# Patient Record
Sex: FEMALE | Race: WHITE | NOT HISPANIC OR LATINO | Employment: FULL TIME | URBAN - METROPOLITAN AREA
[De-identification: names, ages, dates, MRNs, and addresses within clinical notes are randomized per-mention and may not be internally consistent; named-entity substitution may affect disease eponyms.]

---

## 2019-04-16 ENCOUNTER — HOSPITAL ENCOUNTER (INPATIENT)
Facility: HOSPITAL | Age: 64
LOS: 12 days | Discharge: HOME | DRG: 025 | End: 2019-04-28
Attending: EMERGENCY MEDICINE | Admitting: PSYCHIATRY & NEUROLOGY
Payer: COMMERCIAL

## 2019-04-16 DIAGNOSIS — Z29.89 SEIZURE PROPHYLAXIS: ICD-10-CM

## 2019-04-16 DIAGNOSIS — I10 ESSENTIAL HYPERTENSION: ICD-10-CM

## 2019-04-16 DIAGNOSIS — I60.9 SAH (SUBARACHNOID HEMORRHAGE): ICD-10-CM

## 2019-04-16 DIAGNOSIS — Z98.890 S/P COIL EMBOLIZATION OF CEREBRAL ANEURYSM: ICD-10-CM

## 2019-04-16 PROBLEM — R51.9 HEADACHE: Status: ACTIVE | Noted: 2019-04-16

## 2019-04-16 PROBLEM — E07.9 THYROID DISEASE: Status: ACTIVE | Noted: 2019-04-16

## 2019-04-16 LAB
ABO + RH BLD: NORMAL
ALBUMIN SERPL BCP-MCNC: 4.1 G/DL (ref 3.5–5.2)
ALP SERPL-CCNC: 80 U/L (ref 55–135)
ALT SERPL W/O P-5'-P-CCNC: 25 U/L (ref 10–44)
ANION GAP SERPL CALC-SCNC: 10 MMOL/L (ref 8–16)
APTT BLDCRRT: 25 SEC (ref 21–32)
AST SERPL-CCNC: 23 U/L (ref 10–40)
BASOPHILS # BLD AUTO: 0.04 K/UL (ref 0–0.2)
BASOPHILS NFR BLD: 0.6 % (ref 0–1.9)
BILIRUB SERPL-MCNC: 0.7 MG/DL (ref 0.1–1)
BLD GP AB SCN CELLS X3 SERPL QL: NORMAL
BUN SERPL-MCNC: 13 MG/DL (ref 8–23)
CALCIUM SERPL-MCNC: 9.4 MG/DL (ref 8.7–10.5)
CHLORIDE SERPL-SCNC: 109 MMOL/L (ref 95–110)
CHOLEST SERPL-MCNC: 178 MG/DL (ref 120–199)
CHOLEST/HDLC SERPL: 2.9 {RATIO} (ref 2–5)
CO2 SERPL-SCNC: 24 MMOL/L (ref 23–29)
CREAT SERPL-MCNC: 0.8 MG/DL (ref 0.5–1.4)
DIFFERENTIAL METHOD: NORMAL
EOSINOPHIL # BLD AUTO: 0 K/UL (ref 0–0.5)
EOSINOPHIL NFR BLD: 0.5 % (ref 0–8)
ERYTHROCYTE [DISTWIDTH] IN BLOOD BY AUTOMATED COUNT: 13.2 % (ref 11.5–14.5)
EST. GFR  (AFRICAN AMERICAN): >60 ML/MIN/1.73 M^2
EST. GFR  (NON AFRICAN AMERICAN): >60 ML/MIN/1.73 M^2
ESTIMATED AVG GLUCOSE: 111 MG/DL (ref 68–131)
GLUCOSE SERPL-MCNC: 108 MG/DL (ref 70–110)
HBA1C MFR BLD HPLC: 5.5 % (ref 4–5.6)
HCT VFR BLD AUTO: 40.6 % (ref 37–48.5)
HDLC SERPL-MCNC: 62 MG/DL (ref 40–75)
HDLC SERPL: 34.8 % (ref 20–50)
HGB BLD-MCNC: 13.8 G/DL (ref 12–16)
IMM GRANULOCYTES # BLD AUTO: 0.02 K/UL (ref 0–0.04)
IMM GRANULOCYTES NFR BLD AUTO: 0.3 % (ref 0–0.5)
INR PPP: 1 (ref 0.8–1.2)
LDLC SERPL CALC-MCNC: 101.8 MG/DL (ref 63–159)
LYMPHOCYTES # BLD AUTO: 1.4 K/UL (ref 1–4.8)
LYMPHOCYTES NFR BLD: 21.7 % (ref 18–48)
MAGNESIUM SERPL-MCNC: 2 MG/DL (ref 1.6–2.6)
MCH RBC QN AUTO: 29.9 PG (ref 27–31)
MCHC RBC AUTO-ENTMCNC: 34 G/DL (ref 32–36)
MCV RBC AUTO: 88 FL (ref 82–98)
MONOCYTES # BLD AUTO: 0.5 K/UL (ref 0.3–1)
MONOCYTES NFR BLD: 7.5 % (ref 4–15)
NEUTROPHILS # BLD AUTO: 4.5 K/UL (ref 1.8–7.7)
NEUTROPHILS NFR BLD: 69.4 % (ref 38–73)
NONHDLC SERPL-MCNC: 116 MG/DL
NRBC BLD-RTO: 0 /100 WBC
PHOSPHATE SERPL-MCNC: 2.7 MG/DL (ref 2.7–4.5)
PLATELET # BLD AUTO: 281 K/UL (ref 150–350)
PMV BLD AUTO: 9.9 FL (ref 9.2–12.9)
POCT GLUCOSE: 100 MG/DL (ref 70–110)
POTASSIUM SERPL-SCNC: 3.8 MMOL/L (ref 3.5–5.1)
PROT SERPL-MCNC: 7.3 G/DL (ref 6–8.4)
PROTHROMBIN TIME: 10.2 SEC (ref 9–12.5)
RBC # BLD AUTO: 4.62 M/UL (ref 4–5.4)
SODIUM SERPL-SCNC: 143 MMOL/L (ref 136–145)
T4 FREE SERPL-MCNC: 1.32 NG/DL (ref 0.71–1.51)
TRIGL SERPL-MCNC: 71 MG/DL (ref 30–150)
TROPONIN I SERPL DL<=0.01 NG/ML-MCNC: 0.01 NG/ML (ref 0–0.03)
TSH SERPL DL<=0.005 MIU/L-ACNC: 0.86 UIU/ML (ref 0.4–4)
WBC # BLD AUTO: 6.44 K/UL (ref 3.9–12.7)

## 2019-04-16 PROCEDURE — 20000000 HC ICU ROOM

## 2019-04-16 PROCEDURE — 25000003 PHARM REV CODE 250: Performed by: NURSE PRACTITIONER

## 2019-04-16 PROCEDURE — 36620 INSERTION CATHETER ARTERY: CPT

## 2019-04-16 PROCEDURE — 84443 ASSAY THYROID STIM HORMONE: CPT

## 2019-04-16 PROCEDURE — 82962 GLUCOSE BLOOD TEST: CPT

## 2019-04-16 PROCEDURE — 94761 N-INVAS EAR/PLS OXIMETRY MLT: CPT

## 2019-04-16 PROCEDURE — 84100 ASSAY OF PHOSPHORUS: CPT

## 2019-04-16 PROCEDURE — 99284 EMERGENCY DEPT VISIT MOD MDM: CPT | Mod: ,,, | Performed by: PHYSICIAN ASSISTANT

## 2019-04-16 PROCEDURE — A9585 GADOBUTROL INJECTION: HCPCS | Performed by: PSYCHIATRY & NEUROLOGY

## 2019-04-16 PROCEDURE — 83735 ASSAY OF MAGNESIUM: CPT

## 2019-04-16 PROCEDURE — 86850 RBC ANTIBODY SCREEN: CPT

## 2019-04-16 PROCEDURE — 99283 PR EMERGENCY DEPT VISIT,LEVEL III: ICD-10-PCS | Mod: ,,, | Performed by: PHYSICIAN ASSISTANT

## 2019-04-16 PROCEDURE — 99285 EMERGENCY DEPT VISIT HI MDM: CPT | Mod: 25

## 2019-04-16 PROCEDURE — 93010 ELECTROCARDIOGRAM REPORT: CPT | Mod: ,,, | Performed by: INTERNAL MEDICINE

## 2019-04-16 PROCEDURE — 84484 ASSAY OF TROPONIN QUANT: CPT

## 2019-04-16 PROCEDURE — 63600175 PHARM REV CODE 636 W HCPCS: Performed by: NURSE PRACTITIONER

## 2019-04-16 PROCEDURE — 99291 PR CRITICAL CARE, E/M 30-74 MINUTES: ICD-10-PCS | Mod: ,,, | Performed by: PSYCHIATRY & NEUROLOGY

## 2019-04-16 PROCEDURE — 84439 ASSAY OF FREE THYROXINE: CPT

## 2019-04-16 PROCEDURE — 99284 PR EMERGENCY DEPT VISIT,LEVEL IV: ICD-10-PCS | Mod: ,,, | Performed by: PHYSICIAN ASSISTANT

## 2019-04-16 PROCEDURE — 80053 COMPREHEN METABOLIC PANEL: CPT

## 2019-04-16 PROCEDURE — 96365 THER/PROPH/DIAG IV INF INIT: CPT

## 2019-04-16 PROCEDURE — 83036 HEMOGLOBIN GLYCOSYLATED A1C: CPT

## 2019-04-16 PROCEDURE — 25000003 PHARM REV CODE 250

## 2019-04-16 PROCEDURE — 80061 LIPID PANEL: CPT

## 2019-04-16 PROCEDURE — 93010 EKG 12-LEAD: ICD-10-PCS | Mod: ,,, | Performed by: INTERNAL MEDICINE

## 2019-04-16 PROCEDURE — 99291 CRITICAL CARE FIRST HOUR: CPT | Mod: ,,, | Performed by: PSYCHIATRY & NEUROLOGY

## 2019-04-16 PROCEDURE — 99223 PR INITIAL HOSPITAL CARE,LEVL III: ICD-10-PCS | Mod: ,,, | Performed by: PSYCHIATRY & NEUROLOGY

## 2019-04-16 PROCEDURE — 93005 ELECTROCARDIOGRAM TRACING: CPT

## 2019-04-16 PROCEDURE — 96366 THER/PROPH/DIAG IV INF ADDON: CPT

## 2019-04-16 PROCEDURE — 25500020 PHARM REV CODE 255: Performed by: PSYCHIATRY & NEUROLOGY

## 2019-04-16 PROCEDURE — 85610 PROTHROMBIN TIME: CPT

## 2019-04-16 PROCEDURE — 99223 1ST HOSP IP/OBS HIGH 75: CPT | Mod: ,,, | Performed by: PSYCHIATRY & NEUROLOGY

## 2019-04-16 PROCEDURE — 85730 THROMBOPLASTIN TIME PARTIAL: CPT

## 2019-04-16 PROCEDURE — 99283 EMERGENCY DEPT VISIT LOW MDM: CPT | Mod: ,,, | Performed by: PHYSICIAN ASSISTANT

## 2019-04-16 PROCEDURE — 85025 COMPLETE CBC W/AUTO DIFF WBC: CPT

## 2019-04-16 RX ORDER — LEVETIRACETAM 500 MG/1
500 TABLET ORAL 2 TIMES DAILY
Status: DISCONTINUED | OUTPATIENT
Start: 2019-04-16 | End: 2019-04-16

## 2019-04-16 RX ORDER — LIDOCAINE HYDROCHLORIDE 10 MG/ML
5 INJECTION, SOLUTION EPIDURAL; INFILTRATION; INTRACAUDAL; PERINEURAL ONCE
Status: COMPLETED | OUTPATIENT
Start: 2019-04-16 | End: 2019-04-16

## 2019-04-16 RX ORDER — ONDANSETRON 2 MG/ML
4 INJECTION INTRAMUSCULAR; INTRAVENOUS EVERY 8 HOURS PRN
Status: DISCONTINUED | OUTPATIENT
Start: 2019-04-16 | End: 2019-04-16

## 2019-04-16 RX ORDER — LEVETIRACETAM 5 MG/ML
500 INJECTION INTRAVASCULAR EVERY 12 HOURS
Status: DISCONTINUED | OUTPATIENT
Start: 2019-04-16 | End: 2019-04-18

## 2019-04-16 RX ORDER — LANOLIN ALCOHOL/MO/W.PET/CERES
800 CREAM (GRAM) TOPICAL
Status: DISCONTINUED | OUTPATIENT
Start: 2019-04-16 | End: 2019-04-26

## 2019-04-16 RX ORDER — ONDANSETRON 2 MG/ML
4 INJECTION INTRAMUSCULAR; INTRAVENOUS EVERY 6 HOURS PRN
Status: DISCONTINUED | OUTPATIENT
Start: 2019-04-16 | End: 2019-04-28 | Stop reason: HOSPADM

## 2019-04-16 RX ORDER — SODIUM,POTASSIUM PHOSPHATES 280-250MG
2 POWDER IN PACKET (EA) ORAL
Status: DISCONTINUED | OUTPATIENT
Start: 2019-04-16 | End: 2019-04-26

## 2019-04-16 RX ORDER — POTASSIUM CHLORIDE 20 MEQ/15ML
60 SOLUTION ORAL
Status: DISCONTINUED | OUTPATIENT
Start: 2019-04-16 | End: 2019-04-18

## 2019-04-16 RX ORDER — HYDRALAZINE HYDROCHLORIDE 20 MG/ML
10 INJECTION INTRAMUSCULAR; INTRAVENOUS EVERY 4 HOURS PRN
Status: DISCONTINUED | OUTPATIENT
Start: 2019-04-16 | End: 2019-04-17

## 2019-04-16 RX ORDER — ATORVASTATIN CALCIUM 10 MG/1
40 TABLET, FILM COATED ORAL DAILY
Status: DISCONTINUED | OUTPATIENT
Start: 2019-04-17 | End: 2019-04-16

## 2019-04-16 RX ORDER — NICARDIPINE HYDROCHLORIDE 0.2 MG/ML
2.5 INJECTION INTRAVENOUS CONTINUOUS
Status: DISCONTINUED | OUTPATIENT
Start: 2019-04-16 | End: 2019-04-16

## 2019-04-16 RX ORDER — POTASSIUM CHLORIDE 20 MEQ/15ML
40 SOLUTION ORAL
Status: DISCONTINUED | OUTPATIENT
Start: 2019-04-16 | End: 2019-04-18

## 2019-04-16 RX ORDER — NIMODIPINE 30 MG/1
60 CAPSULE, LIQUID FILLED ORAL EVERY 4 HOURS
Status: DISCONTINUED | OUTPATIENT
Start: 2019-04-16 | End: 2019-04-28 | Stop reason: HOSPADM

## 2019-04-16 RX ORDER — OXYCODONE HYDROCHLORIDE 5 MG/1
5 TABLET ORAL EVERY 6 HOURS PRN
Status: DISCONTINUED | OUTPATIENT
Start: 2019-04-16 | End: 2019-04-28 | Stop reason: HOSPADM

## 2019-04-16 RX ORDER — SODIUM CHLORIDE 0.9 % (FLUSH) 0.9 %
10 SYRINGE (ML) INJECTION
Status: DISCONTINUED | OUTPATIENT
Start: 2019-04-16 | End: 2019-04-26

## 2019-04-16 RX ORDER — AMOXICILLIN 250 MG
1 CAPSULE ORAL 2 TIMES DAILY
Status: DISCONTINUED | OUTPATIENT
Start: 2019-04-16 | End: 2019-04-18

## 2019-04-16 RX ORDER — SODIUM CHLORIDE 9 MG/ML
INJECTION, SOLUTION INTRAVENOUS CONTINUOUS
Status: DISCONTINUED | OUTPATIENT
Start: 2019-04-16 | End: 2019-04-26

## 2019-04-16 RX ORDER — ACETAMINOPHEN 325 MG/1
650 TABLET ORAL EVERY 6 HOURS PRN
Status: DISCONTINUED | OUTPATIENT
Start: 2019-04-16 | End: 2019-04-28 | Stop reason: HOSPADM

## 2019-04-16 RX ORDER — DIPHENHYDRAMINE HYDROCHLORIDE 50 MG/ML
12.5 INJECTION INTRAMUSCULAR; INTRAVENOUS
Status: ACTIVE | OUTPATIENT
Start: 2019-04-16 | End: 2019-04-17

## 2019-04-16 RX ORDER — GADOBUTROL 604.72 MG/ML
8 INJECTION INTRAVENOUS
Status: COMPLETED | OUTPATIENT
Start: 2019-04-16 | End: 2019-04-16

## 2019-04-16 RX ORDER — HYDRALAZINE HYDROCHLORIDE 20 MG/ML
10 INJECTION INTRAMUSCULAR; INTRAVENOUS EVERY 4 HOURS PRN
Status: DISCONTINUED | OUTPATIENT
Start: 2019-04-16 | End: 2019-04-16

## 2019-04-16 RX ORDER — ONDANSETRON 2 MG/ML
INJECTION INTRAMUSCULAR; INTRAVENOUS
Status: DISPENSED
Start: 2019-04-16 | End: 2019-04-17

## 2019-04-16 RX ORDER — NICARDIPINE HYDROCHLORIDE 0.2 MG/ML
INJECTION INTRAVENOUS
Status: COMPLETED
Start: 2019-04-16 | End: 2019-04-16

## 2019-04-16 RX ORDER — NICARDIPINE HYDROCHLORIDE 0.2 MG/ML
5 INJECTION INTRAVENOUS CONTINUOUS
Status: DISCONTINUED | OUTPATIENT
Start: 2019-04-16 | End: 2019-04-17

## 2019-04-16 RX ADMIN — ONDANSETRON 4 MG: 2 INJECTION INTRAMUSCULAR; INTRAVENOUS at 09:04

## 2019-04-16 RX ADMIN — LIDOCAINE HYDROCHLORIDE 50 MG: 10 INJECTION, SOLUTION EPIDURAL; INFILTRATION; INTRACAUDAL; PERINEURAL at 11:04

## 2019-04-16 RX ADMIN — GADOBUTROL 8 ML: 604.72 INJECTION INTRAVENOUS at 07:04

## 2019-04-16 RX ADMIN — NICARDIPINE HYDROCHLORIDE 2.5 MG/HR: 0.2 INJECTION INTRAVENOUS at 04:04

## 2019-04-16 RX ADMIN — NICARDIPINE HYDROCHLORIDE 2.5 MG/HR: 0.2 INJECTION, SOLUTION INTRAVENOUS at 04:04

## 2019-04-16 RX ADMIN — LEVETIRACETAM 500 MG: 5 INJECTION INTRAVENOUS at 10:04

## 2019-04-16 RX ADMIN — NIMODIPINE 60 MG: 30 CAPSULE, LIQUID FILLED ORAL at 10:04

## 2019-04-16 RX ADMIN — NICARDIPINE HYDROCHLORIDE 5 MG/HR: 0.2 INJECTION, SOLUTION INTRAVENOUS at 06:04

## 2019-04-16 NOTE — HPI
The patient is a 63-year-old female with PMH of HTN, HLD, Thyroid Disease, that presents as a transfer from [facility] ([facility]) for management of intracranial aneurysm.  Patient presented to [facility] ED with complaints of sudden onset severe headache with neck stiffness that began at 8am today. She acknowledges, headache, visual disturbances, nausea without vomiting, but denies numbness and tingling.  CT head and CTA brain were obtained. 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  There was no evidence of acute intracranial hemorrhage.   She endorses having taken IBU but with no relief. Denies anticoagulant use. Of note,  patient's father  or intracerebral aneurysm at an early age.

## 2019-04-16 NOTE — SUBJECTIVE & OBJECTIVE
Past Medical History:   Diagnosis Date    Hyperlipemia     Hypertension     Thyroid disease      Past Surgical History:   Procedure Laterality Date    HYSTERECTOMY       Family History   Problem Relation Age of Onset    Aneurysm Father      Social History     Tobacco Use    Smoking status: Never Smoker    Smokeless tobacco: Never Used   Substance Use Topics    Alcohol use: Not Currently    Drug use: Never     Review of patient's allergies indicates:  No Known Allergies    Medications: I have reviewed the current medication administration record.      (Not in a hospital admission)    Review of Systems   Constitutional: Negative for fever.   HENT: Negative for trouble swallowing.    Eyes: Positive for visual disturbance.   Respiratory: Negative for shortness of breath.    Cardiovascular: Negative for chest pain.   Gastrointestinal: Negative for nausea and vomiting.   Genitourinary: Negative for urgency.   Musculoskeletal: Negative for gait problem.   Skin: Negative for color change.   Neurological: Positive for headaches. Negative for speech difficulty and weakness.   Psychiatric/Behavioral: Negative for agitation and confusion.     Objective:     Vital Signs (Most Recent):  Temp: 99 °F (37.2 °C) (04/16/19 1637)  Pulse: 85 (04/16/19 1817)  Resp: 19 (04/16/19 1816)  BP: (!) 144/67 (04/16/19 1817)  SpO2: 95 % (04/16/19 1818)    Vital Signs Range (Last 24H):  Temp:  [99 °F (37.2 °C)]   Pulse:  [85-92]   Resp:  [13-20]   BP: (128-145)/(62-70)   SpO2:  [94 %-97 %]     Physical Exam   Constitutional: She is oriented to person, place, and time. She appears well-developed.   HENT:   Head: Normocephalic and atraumatic.   Eyes: Pupils are equal, round, and reactive to light. EOM are normal.   Cardiovascular: Normal rate.   Pulmonary/Chest: Effort normal.   Abdominal: Soft.   Musculoskeletal: Normal range of motion.   Neurological: She is alert and oriented to person, place, and time.   Skin: Skin is warm.    Psychiatric: She has a normal mood and affect.   Vitals reviewed.      Neurological Exam:   LOC: alert  Attention Span: Good   Language: No aphasia  Articulation: No dysarthria  Orientation: Person, Place, Time   Visual Fields: Full  Pupils (CN II, III): PERRL  Facial Sensation (CN V): Normal  Facial Movement (CN VII): Symmetric facial expression    Motor: Arm left  Normal 5/5  Leg left  Normal 5/5  Arm right  Normal 5/5  Leg right Normal 5/5  Cebellar: No evidence of appendicular or axial ataxia  Sensation: Intact to light touch, temperature and vibration      Laboratory:  CMP: No results for input(s): GLUCOSE, CALCIUM, ALBUMIN, PROT, NA, K, CO2, CL, BUN, CREATININE, ALKPHOS, ALT, AST, BILITOT in the last 24 hours.  CBC:   Recent Labs   Lab 04/16/19  1817   WBC 6.44   RBC 4.62   HGB 13.8   HCT 40.6      MCV 88   MCH 29.9   MCHC 34.0     Lipid Panel: No results for input(s): CHOL, LDLCALC, HDL, TRIG in the last 168 hours.  Coagulation: No results for input(s): PT, INR, APTT in the last 168 hours.  Hgb A1C: No results for input(s): HGBA1C in the last 168 hours.  TSH: No results for input(s): TSH in the last 168 hours.    Diagnostic Results:      Brain imaging:  CT head pending    CTA outside facility revealing saccular aneurysm in the left ICA    Cardiac Evaluation:   TTE pending

## 2019-04-16 NOTE — ASSESSMENT & PLAN NOTE
Patient with complaint of sudden onset of HA this AM. Seen at [facility] for severe headache and photophobia. CTA completed which revealed left ICA saccular aneurysm. Patient transferred to [facility] for further neurological evaluation. Patient to be admitted to [facility]. NSx consulted.    Antithrombotics for secondary stroke prevention:  None due to aneurysm and potential SAH     Statins for secondary stroke prevention and hyperlipidemia, if present:   Continue Atorvastatin 40 mg     Aggressive risk factor modification: family hx of aneurysm, L ICA aneusym      Rehab efforts: PT/OT/SLP to evaluate and treat    Diagnostics ordered/pending: CT scan of head without contrast to asses brain parenchyma, HgbA1C to assess blood glucose levels, Lipid Profile to assess cholesterol levels, MRI head without contrast to assess brain parenchyma, TTE to assess cardiac function/status , TSH to assess thyroid function     VTE prophylaxis: None due to potential SAH     BP parameters: SAH: Unsecured aneurysm, SBP <140

## 2019-04-16 NOTE — ED TRIAGE NOTES
Pt presents from [facility] for further evaluation of a left supraclinoid ICA. Pt began having a posterior headache last night and worsened this morning which prompted her to go to the ED. Pt has a history of HTN, HLD, thyroid disorder. Pt is compliant with her medications.

## 2019-04-16 NOTE — PROGRESS NOTES
[facility]  Neurosurgery  Progress Note    Subjective:     History of Present Illness: 63 year old female with PMHx HTN, hypothyroidism presents as a transfer from [facility] for evaluation of left ICA saccular aneurysm. Patient's family at bedside providing majority of history. Patient reports severe posterior headache, neck pain, photophobia starting at 930am. Subsequently brought to the ED by her family. Denies weakness, numbness, paresthesias, seizure activity, LOC, falls/trauma. Family at bedside state patient's father  in his 30s of intracerebral aneurysmal rupture. [place] [facility] [place] higher level of care. Denies use of anticoagulants or antiplatelet therapy. Reports compliance with BP medication however family states SBP is usually ~150s at home. Review of coags at OSH all within normal limits: aPTT 28, INR 0.9, PT 12.7.    Post-Op Info:  * No surgery found *           (Not in a hospital admission)    Review of patient's allergies indicates:  No Known Allergies    Past Medical History:   Diagnosis Date    Hyperlipemia     Hypertension     Thyroid disease      Past Surgical History:   Procedure Laterality Date    HYSTERECTOMY       Family History     Problem Relation (Age of Onset)    Aneurysm Father        Tobacco Use    Smoking status: Never Smoker    Smokeless tobacco: Never Used   Substance and Sexual Activity    Alcohol use: Not Currently    Drug use: Never    Sexual activity: Not on file     Review of Systems   Constitutional: no fever, chills or night sweats. No changes in weight   Eyes: + photophobia  ENT: no nasal congestion or sore throat   Respiratory: no cough or shortness of breath   Cardiovascular: no chest pain or palpitations   Gastrointestinal: no nausea or vomiting   Genitourinary: no hematuria or dysuria   Integument/Breast: no rash or pruritis   Hematologic/Lymphatic: no easy bruising or lymphadenopathy   Musculoskeletal: + neck  stiffness  Neurological: no seizures or tremors, + headache   Behavioral/Psych: no auditory or visual hallucinations   Endocrine: no heat or cold intolerance       Objective:     Weight: 77.1 kg (170 lb)  Body mass index is 27.44 kg/m².  Vital Signs (Most Recent):  Temp: 99 °F (37.2 °C) (04/16/19 1637)  Pulse: 88 (04/16/19 1802)  Resp: 17 (04/16/19 1802)  BP: 134/62 (04/16/19 1802)  SpO2: 97 % (04/16/19 1802) Vital Signs (24h Range):  Temp:  [99 °F (37.2 °C)] 99 °F (37.2 °C)  Pulse:  [86-92] 88  Resp:  [13-20] 17  SpO2:  [94 %-97 %] 97 %  BP: (128-145)/(62-70) 134/62                   Neurosurgery Physical Exam    General: well developed, well nourished, no distress.   Head: normocephalic, atraumatic  Neurologic: Alert and oriented. Thought content appropriate.  GCS: Motor: 6/Verbal: 5/Eyes: 4 GCS Total: 15  Mental Status: Awake, Alert, Oriented x 4  Language: No aphasia  Speech: No dysarthria  Cranial nerves: face symmetric, tongue midline, CN II-XII grossly intact.   Eyes: pupils equal, round, reactive to light with accomodation, EOMI.   Pulmonary: normal respirations, no signs of respiratory distress  Abdomen: soft, non-distended, not tender to palpation  Sensory: intact to light touch throughout  Motor Strength: Moves all extremities spontaneously with good strength and tone. Full strength upper and lower extremities. No abnormal movements seen.   Pronator Drift: no drift noted  Finger-to-nose: slow response however intact bilaterally  Skin: Skin is warm, dry and intact.      Significant Labs:  No results for input(s): GLU, NA, K, CL, CO2, BUN, CREATININE, CALCIUM, MG in the last 48 hours.  No results for input(s): WBC, HGB, HCT, PLT in the last 48 hours.  No results for input(s): LABPT, INR, APTT in the last 48 hours.  Microbiology Results (last 7 days)     ** No results found for the last 168 hours. **        Recent Lab Results       04/16/19  1822        POCT Glucose 100         All pertinent labs from the  last 24 hours have been reviewed.    Significant Diagnostics:  CT head without contrast and CTA on outside disc reviewed with staff.     Assessment/Plan:     * SAH (subarachnoid hemorrhage)  63 year old female with PMHx HTN, hypothyroidism presents as a transfer from [facility] for evaluation of left ICA saccular aneurysm.     - Neurologically stable  - CT/CTA reviewed with staff. Left supraclinoid ICA saccular aneurysm. No clear evidence of ICH. No hydrocephalus noted. Will need to obtain repeat CT head without contrast and MRI brain stealth with MRA with vessel wall imaging for further assessment. Imaging ordered.     - NPO  - Further recommendations to follow completion of workup  - Admit to Neurocritical care  - Neurochecks q1h  - SBP < 140  - Daily TCDs  - Start Nimotop x 21 days  - Keppra 500 BID for seizure ppx  - HOB > 30  - Please page NSGY with any changes in exam  - Discussed with Dr. Aubree Rodriguez PA-C  Neurosurgery  [facility]

## 2019-04-16 NOTE — HOSPITAL COURSE
4/16 Admit to NCC, Keppra, Lisa, CTH, MRI  4/17 s/p Angio, ECHO, Permissive HTN  4/18 Solumedrol for HA, Start heparin, Start Diet  4/19 Boost added to diet, Solumedrol scheduled, Myralax, ABG  4/20  PBD 4 . No significant events over night. TCDs  With no vasospasms. Restarted home meds.  4/21 TCDs pending. No H/A today. wea keppra. EEG negative 4/20 4/22 TCDs from 4/20 -21 showed no evidence of vasospasms. No H/A today. No change in neuro exam. Bolus 500cc to keep I/O equal.  4/23: NAEO  4/25: Stable, mild headache today, TCD negative for vasospasms  4/26: Transfer to NSGY floor

## 2019-04-16 NOTE — SUBJECTIVE & OBJECTIVE
(Not in a hospital admission)    Review of patient's allergies indicates:  No Known Allergies    Past Medical History:   Diagnosis Date    Hyperlipemia     Hypertension     Thyroid disease      Past Surgical History:   Procedure Laterality Date    HYSTERECTOMY       Family History     Problem Relation (Age of Onset)    Aneurysm Father        Tobacco Use    Smoking status: Never Smoker    Smokeless tobacco: Never Used   Substance and Sexual Activity    Alcohol use: Not Currently    Drug use: Never    Sexual activity: Not on file     Review of Systems   Constitutional: no fever, chills or night sweats. No changes in weight   Eyes: + photophobia  ENT: no nasal congestion or sore throat   Respiratory: no cough or shortness of breath   Cardiovascular: no chest pain or palpitations   Gastrointestinal: no nausea or vomiting   Genitourinary: no hematuria or dysuria   Integument/Breast: no rash or pruritis   Hematologic/Lymphatic: no easy bruising or lymphadenopathy   Musculoskeletal: + neck stiffness  Neurological: no seizures or tremors, + headache   Behavioral/Psych: no auditory or visual hallucinations   Endocrine: no heat or cold intolerance       Objective:     Weight: 77.1 kg (170 lb)  Body mass index is 27.44 kg/m².  Vital Signs (Most Recent):  Temp: 99 °F (37.2 °C) (04/16/19 1637)  Pulse: 88 (04/16/19 1802)  Resp: 17 (04/16/19 1802)  BP: 134/62 (04/16/19 1802)  SpO2: 97 % (04/16/19 1802) Vital Signs (24h Range):  Temp:  [99 °F (37.2 °C)] 99 °F (37.2 °C)  Pulse:  [86-92] 88  Resp:  [13-20] 17  SpO2:  [94 %-97 %] 97 %  BP: (128-145)/(62-70) 134/62                   Neurosurgery Physical Exam    General: well developed, well nourished, no distress.   Head: normocephalic, atraumatic  Neurologic: Alert and oriented. Thought content appropriate.  GCS: Motor: 6/Verbal: 5/Eyes: 4 GCS Total: 15  Mental Status: Awake, Alert, Oriented x 4  Language: No aphasia  Speech: No dysarthria  Cranial nerves: face symmetric,  tongue midline, CN II-XII grossly intact.   Eyes: pupils equal, round, reactive to light with accomodation, EOMI.   Pulmonary: normal respirations, no signs of respiratory distress  Abdomen: soft, non-distended, not tender to palpation  Sensory: intact to light touch throughout  Motor Strength: Moves all extremities spontaneously with good strength and tone. Full strength upper and lower extremities. No abnormal movements seen.   Pronator Drift: no drift noted  Finger-to-nose: slow response however intact  Skin: Skin is warm, dry and intact.      Significant Labs:  No results for input(s): GLU, NA, K, CL, CO2, BUN, CREATININE, CALCIUM, MG in the last 48 hours.  No results for input(s): WBC, HGB, HCT, PLT in the last 48 hours.  No results for input(s): LABPT, INR, APTT in the last 48 hours.  Microbiology Results (last 7 days)     ** No results found for the last 168 hours. **        Recent Lab Results       04/16/19  1822        POCT Glucose 100         All pertinent labs from the last 24 hours have been reviewed.    Significant Diagnostics:  CT head without contrast and CTA on outside disc reviewed with staff.

## 2019-04-16 NOTE — ED PROVIDER NOTES
Encounter Date: 2019       History     Chief Complaint   Patient presents with    transfer     Transfer from [place] for cerebral aneursym. Pt arrived with cardene. Pt complaining of headache.     63-year-old female presents as a transfer from [facility] for higher level of care and management of intracranial aneurysm.  Patient presented to initial ED with complaints of sudden onset severe headache with neck stiffness that began at 8am today.  CT head and CTA brain was obtained. Pt was found to have a saccular aneurysm in the left ICA.  There was no evidence of acute intracranial hemorrhage. Patient currently complaining of headache and photophobia.  She reports no relief with meds given prior to transfer.  Patient arrived on Cardene drip with pressure in 140s.  Patient's father  or intracerebral aneurysm.  Denies anticoagulant use.        Review of patient's allergies indicates:  No Known Allergies  Past Medical History:   Diagnosis Date    Hyperlipemia     Hypertension     Thyroid disease      Past Surgical History:   Procedure Laterality Date    HYSTERECTOMY       Family History   Problem Relation Age of Onset    Aneurysm Father      Social History     Tobacco Use    Smoking status: Never Smoker    Smokeless tobacco: Never Used   Substance Use Topics    Alcohol use: Not Currently    Drug use: Never     Review of Systems   Constitutional: Negative for fever.   HENT: Negative for sore throat.    Eyes: Positive for photophobia.   Respiratory: Negative for shortness of breath.    Cardiovascular: Negative for chest pain.   Gastrointestinal: Negative for nausea.   Genitourinary: Negative for dysuria.   Musculoskeletal: Positive for neck stiffness. Negative for back pain.   Skin: Negative for rash.   Neurological: Positive for headaches. Negative for weakness.   Hematological: Does not bruise/bleed easily.       Physical Exam     Initial Vitals [19 1637]   BP Pulse Resp  Temp SpO2   128/70 92 20 99 °F (37.2 °C) 96 %      MAP       --         Physical Exam    Nursing note and vitals reviewed.  Constitutional: She appears well-developed and well-nourished. She is not diaphoretic.  Non-toxic appearance. She does not appear ill. No distress.   HENT:   Head: Normocephalic and atraumatic.   Eyes: EOM are normal. Pupils are equal, round, and reactive to light.   Neck: Neck supple.   Cardiovascular: Normal rate and regular rhythm. Exam reveals no gallop and no friction rub.    No murmur heard.  Pulmonary/Chest: Effort normal and breath sounds normal. No accessory muscle usage. No tachypnea. No respiratory distress. She has no decreased breath sounds. She has no wheezes. She has no rhonchi. She has no rales.   Abdominal: She exhibits no distension.   Neurological: She is alert and oriented to person, place, and time. She has normal strength.   No facial droop.  Speech is clear.  Normal strength to upper and lower extremities.   Skin: No rash noted.   Psychiatric: She has a normal mood and affect. Her behavior is normal.         ED Course   Procedures  Labs Reviewed   COMPREHENSIVE METABOLIC PANEL   LIPID PANEL   HEMOGLOBIN A1C   TSH   CBC W/ AUTO DIFFERENTIAL   MAGNESIUM   PHOSPHORUS   URINALYSIS, REFLEX TO URINE CULTURE   TROPONIN I   APTT   PROTIME-INR   TYPE & SCREEN   POCT GLUCOSE   POCT GLUCOSE MONITORING CONTINUOUS          Imaging Results          CT Head Without Contrast (In process)                  Medical Decision Making:   History:   Old Medical Records: I decided to obtain old medical records.  Old Records Summarized: records from another hospital.       <> Summary of Records: CTH and CTA brain with L saccular aneurysm.   Differential Diagnosis:   My differential diagnosis includes but is not limited to:  SAH, cerebral aneurysm, migraine       APC / Resident Notes:   63-year-old female presents as a transfer for Neurosurgery consult for ICA aneurysm with headache and neck  stiffness. SBP is 140s. Patient has a nonfocal neuro exam.  Neurosurgery consulted promptly upon patient's arrival.   Patient will be admitted to Neurosurgery service for further treatment and management.   I have reviewed the patient's records and discussed this case with my supervising physician.                   Clinical Impression:       ICD-10-CM ICD-9-CM   1. SAH (subarachnoid hemorrhage) I60.9 430   2. SAH (subarachnoid hemorrhage) I60.9 430         Disposition:   Disposition: Admitted  Condition: Duane Mora PA-C  04/16/19 1826

## 2019-04-16 NOTE — SUBJECTIVE & OBJECTIVE
Past Medical History:   Diagnosis Date    Hyperlipemia     Hypertension     Thyroid disease      Past Surgical History:   Procedure Laterality Date    HYSTERECTOMY        No current facility-administered medications on file prior to encounter.      No current outpatient medications on file prior to encounter.      Allergies: Patient has no known allergies.    Family History   Problem Relation Age of Onset    Aneurysm Father      Social History     Tobacco Use    Smoking status: Never Smoker    Smokeless tobacco: Never Used   Substance Use Topics    Alcohol use: Not Currently    Drug use: Never     Review of Systems   Constitutional: Positive for activity change.   HENT: Negative for ear pain, hearing loss, sinus pain and trouble swallowing.    Eyes: Positive for photophobia and visual disturbance.   Respiratory: Negative for apnea, chest tightness and shortness of breath.    Cardiovascular: Negative for chest pain.   Gastrointestinal: Positive for nausea. Negative for abdominal distention, abdominal pain and vomiting.   Genitourinary: Negative for flank pain.   Musculoskeletal: Positive for neck pain and neck stiffness. Negative for back pain.   Skin: Negative for color change.   Neurological: Positive for weakness and headaches. Negative for speech difficulty.   Psychiatric/Behavioral: The patient is nervous/anxious.      Objective:     Vitals:    Temp: 99 °F (37.2 °C)  Pulse: 85  BP: (!) 144/67  MAP (mmHg): 96  Resp: 19  SpO2: 95 %  O2 Device (Oxygen Therapy): nasal cannula    Temp  Min: 99 °F (37.2 °C)  Max: 99 °F (37.2 °C)  Pulse  Min: 85  Max: 92  BP  Min: 128/70  Max: 145/65  MAP (mmHg)  Min: 89  Max: 96  Resp  Min: 13  Max: 20  SpO2  Min: 94 %  Max: 97 %    No intake/output data recorded.           Physical Exam   Constitutional: She appears well-developed and well-nourished.   HENT:   Head: Normocephalic and atraumatic.   Eyes: Pupils are equal, round, and reactive to light. EOM are normal. Right  eye exhibits no discharge. Left eye exhibits no discharge.   Neck: Normal range of motion. No JVD present. No tracheal deviation present.   Cardiovascular: Normal rate and regular rhythm.   Pulmonary/Chest: Effort normal and breath sounds normal. No respiratory distress.   Abdominal: Soft. Bowel sounds are normal. She exhibits no distension.   Musculoskeletal: Normal range of motion.   Neurological:   Alert oriented follows commands  Fluent; Negative Aphasia Dysarthria  Pupils equal and reactive, EOMI  no gaze preference or deviation  Sensory intact even throughout  no facial asymmetry  5/5 in both upper and lower extremities         Unable to test coordination, gait due to level of consciousness.    Today I personally reviewed pertinent medications, lines/drains/airways, imaging, laboratory results, notably:

## 2019-04-16 NOTE — HPI
63 year old female with PMHx HTN, hypothyroidism presents as a transfer from [facility] for evaluation of left ICA saccular aneurysm. Patient's family at bedside providing majority of history. Patient reports severe posterior headache, neck pain, photophobia starting at 930am. Subsequently brought to the ED by her family. Denies weakness, numbness, paresthesias, seizure activity, LOC, falls/trauma. Family at bedside state patient's father  in his 30s of intracerebral aneurysmal rupture. Transferred to [facility] for higher level of care. Denies use of anticoagulants or antiplatelet therapy. Reports compliance with BP medication however family states SBP is usually ~150s at home. Review of coags at OSH all within normal limits: aPTT 28, INR 0.9, PT 12.7.

## 2019-04-16 NOTE — HPI
63 year old female with past medical hx of HTN, HLD, GERD, hypothyroidism presented to [facility] with complaints of headache and photophobia. She states this morning while at work she had onset of sudden headache. She rates the pain 10/10. Associated with the headache she also had neck stiffness and back pain. When she went home her family member came to see her and she was concerned she was having a stroke or a heart attack and brought her to the ED. Her father  due to intracerebral aneurysm. CTA was completed at outside facility revealing saccular aneurysm in the left ICA. Patient is not on anticoagulation.

## 2019-04-16 NOTE — ED NOTES
Pt's first and last name and birthday confirmed.   LOC: The patient is awake, alert and aware of environment with an appropriate affect, the patient is oriented x 3 and speaking appropriately.  APPEARANCE: Patient resting comfortably and in no acute distress, patient is clean and well groomed.  SKIN: The skin is warm and dry, patient has normal skin turgor and moist mucus membranes, skin intact, no breakdown or brusing noted.  MUSKULOSKELETAL: Patient moving all extremities well, no obvious swelling or deformities noted.  RESPIRATORY: Airway is open and patent, respirations are spontaneous, patient has a normal effort and rate. Breath sounds are clear and equal bilaterally.  CARDIAC: Normal heart sounds. No peripheral edema.  ABDOMEN: Soft and non tender to palpation, no distention noted. Bowel sounds present.   NEURO: (See flow sheet).

## 2019-04-16 NOTE — ASSESSMENT & PLAN NOTE
63 year old female with PMHx HTN, hypothyroidism presents as a transfer from [facility] for evaluation of left ICA saccular aneurysm.     - Neurologically stable  - CT/CTA reviewed with staff. Left supraclinoid ICA saccular aneurysm. No clear evidence of ICH. No hydrocephalus noted. Will need to obtain repeat CT head without contrast and MRI brain stealth with MRA with vessel wall imaging for further assessment. Imaging ordered.     - NPO  - Further recommendations to follow completion of workup  - Admit to Neurocritical care  - Neurochecks q1h  - SBP < 140  - Daily TCDs  - Start Nimotop x 21 days  - Keppra for seizure ppx  - HOB > 30  - Please page NSGY with any changes in exam  - Discussed with Dr. Mak

## 2019-04-17 ENCOUNTER — ANESTHESIA (OUTPATIENT)
Dept: ENDOSCOPY | Facility: HOSPITAL | Age: 64
DRG: 025 | End: 2019-04-17
Payer: COMMERCIAL

## 2019-04-17 ENCOUNTER — ANESTHESIA EVENT (OUTPATIENT)
Dept: ENDOSCOPY | Facility: HOSPITAL | Age: 64
DRG: 025 | End: 2019-04-17
Payer: COMMERCIAL

## 2019-04-17 PROBLEM — G93.6 VASOGENIC CEREBRAL EDEMA: Status: ACTIVE | Noted: 2019-04-17

## 2019-04-17 PROBLEM — Z98.890 S/P COIL EMBOLIZATION OF CEREBRAL ANEURYSM: Status: ACTIVE | Noted: 2019-04-17

## 2019-04-17 LAB
ALBUMIN SERPL BCP-MCNC: 3.8 G/DL (ref 3.5–5.2)
ALP SERPL-CCNC: 75 U/L (ref 55–135)
ALT SERPL W/O P-5'-P-CCNC: 22 U/L (ref 10–44)
ANION GAP SERPL CALC-SCNC: 11 MMOL/L (ref 8–16)
ASCENDING AORTA: 3.19 CM
AST SERPL-CCNC: 23 U/L (ref 10–40)
AV INDEX (PROSTH): 0.66
AV MEAN GRADIENT: 6.21 MMHG
AV PEAK GRADIENT: 10.63 MMHG
AV VALVE AREA: 1.87 CM2
AV VELOCITY RATIO: 0.71
BASOPHILS # BLD AUTO: 0.04 K/UL (ref 0–0.2)
BASOPHILS NFR BLD: 0.4 % (ref 0–1.9)
BILIRUB SERPL-MCNC: 0.7 MG/DL (ref 0.1–1)
BSA FOR ECHO PROCEDURE: 1.9 M2
BUN SERPL-MCNC: 13 MG/DL (ref 8–23)
CALCIUM SERPL-MCNC: 8.9 MG/DL (ref 8.7–10.5)
CHLORIDE SERPL-SCNC: 110 MMOL/L (ref 95–110)
CO2 SERPL-SCNC: 20 MMOL/L (ref 23–29)
CREAT SERPL-MCNC: 0.9 MG/DL (ref 0.5–1.4)
CV ECHO LV RWT: 0.48 CM
DIFFERENTIAL METHOD: ABNORMAL
DOP CALC AO PEAK VEL: 1.63 M/S
DOP CALC AO VTI: 33.36 CM
DOP CALC LVOT AREA: 2.83 CM2
DOP CALC LVOT DIAMETER: 1.9 CM
DOP CALC LVOT PEAK VEL: 1.15 M/S
DOP CALC LVOT STROKE VOLUME: 62.4 CM3
DOP CALCLVOT PEAK VEL VTI: 22.02 CM
E WAVE DECELERATION TIME: 155.6 MSEC
E/A RATIO: 1.26
E/E' RATIO: 8.73
ECHO LV POSTERIOR WALL: 1.01 CM (ref 0.6–1.1)
EOSINOPHIL # BLD AUTO: 0 K/UL (ref 0–0.5)
EOSINOPHIL NFR BLD: 0.3 % (ref 0–8)
ERYTHROCYTE [DISTWIDTH] IN BLOOD BY AUTOMATED COUNT: 13.1 % (ref 11.5–14.5)
EST. GFR  (AFRICAN AMERICAN): >60 ML/MIN/1.73 M^2
EST. GFR  (NON AFRICAN AMERICAN): >60 ML/MIN/1.73 M^2
FRACTIONAL SHORTENING: 32 % (ref 28–44)
GLUCOSE SERPL-MCNC: 143 MG/DL (ref 70–110)
HCT VFR BLD AUTO: 39.3 % (ref 37–48.5)
HGB BLD-MCNC: 13.3 G/DL (ref 12–16)
IMM GRANULOCYTES # BLD AUTO: 0.03 K/UL (ref 0–0.04)
IMM GRANULOCYTES NFR BLD AUTO: 0.3 % (ref 0–0.5)
INTERVENTRICULAR SEPTUM: 1.23 CM (ref 0.6–1.1)
LA MAJOR: 5 CM
LA MINOR: 5 CM
LA WIDTH: 3.9 CM
LEFT ATRIUM SIZE: 3.58 CM
LEFT ATRIUM VOLUME INDEX: 31.5 ML/M2
LEFT ATRIUM VOLUME: 59.34 CM3
LEFT INTERNAL DIMENSION IN SYSTOLE: 2.89 CM (ref 2.1–4)
LEFT VENTRICLE DIASTOLIC VOLUME INDEX: 42.49 ML/M2
LEFT VENTRICLE DIASTOLIC VOLUME: 79.99 ML
LEFT VENTRICLE MASS INDEX: 86.6 G/M2
LEFT VENTRICLE SYSTOLIC VOLUME INDEX: 17 ML/M2
LEFT VENTRICLE SYSTOLIC VOLUME: 32.01 ML
LEFT VENTRICULAR INTERNAL DIMENSION IN DIASTOLE: 4.23 CM (ref 3.5–6)
LEFT VENTRICULAR MASS: 162.97 G
LV LATERAL E/E' RATIO: 8.73
LV SEPTAL E/E' RATIO: 8.73
LYMPHOCYTES # BLD AUTO: 1.7 K/UL (ref 1–4.8)
LYMPHOCYTES NFR BLD: 16.2 % (ref 18–48)
MAGNESIUM SERPL-MCNC: 2.1 MG/DL (ref 1.6–2.6)
MCH RBC QN AUTO: 30.1 PG (ref 27–31)
MCHC RBC AUTO-ENTMCNC: 33.8 G/DL (ref 32–36)
MCV RBC AUTO: 89 FL (ref 82–98)
MONOCYTES # BLD AUTO: 0.7 K/UL (ref 0.3–1)
MONOCYTES NFR BLD: 6.5 % (ref 4–15)
MV PEAK A VEL: 0.76 M/S
MV PEAK E VEL: 0.96 M/S
NEUTROPHILS # BLD AUTO: 8.2 K/UL (ref 1.8–7.7)
NEUTROPHILS NFR BLD: 76.3 % (ref 38–73)
NRBC BLD-RTO: 0 /100 WBC
PHOSPHATE SERPL-MCNC: 2.6 MG/DL (ref 2.7–4.5)
PISA TR MAX VEL: 3.06 M/S
PLATELET # BLD AUTO: 296 K/UL (ref 150–350)
PMV BLD AUTO: 9.6 FL (ref 9.2–12.9)
POCT GLUCOSE: 102 MG/DL (ref 70–110)
POCT GLUCOSE: 111 MG/DL (ref 70–110)
POCT GLUCOSE: 123 MG/DL (ref 70–110)
POCT GLUCOSE: 141 MG/DL (ref 70–110)
POCT GLUCOSE: 58 MG/DL (ref 70–110)
POTASSIUM SERPL-SCNC: 3.7 MMOL/L (ref 3.5–5.1)
PROT SERPL-MCNC: 6.7 G/DL (ref 6–8.4)
RA MAJOR: 5.21 CM
RA PRESSURE: 3 MMHG
RA WIDTH: 3.28 CM
RBC # BLD AUTO: 4.42 M/UL (ref 4–5.4)
RIGHT VENTRICULAR END-DIASTOLIC DIMENSION: 3.55 CM
SINUS: 2.6 CM
SODIUM SERPL-SCNC: 141 MMOL/L (ref 136–145)
STJ: 2.42 CM
TDI LATERAL: 0.11
TDI SEPTAL: 0.11
TDI: 0.11
TR MAX PG: 37.45 MMHG
TRICUSPID ANNULAR PLANE SYSTOLIC EXCURSION: 2.73 CM
TV REST PULMONARY ARTERY PRESSURE: 40 MMHG
WBC # BLD AUTO: 10.71 K/UL (ref 3.9–12.7)

## 2019-04-17 PROCEDURE — 36620 ARTERIAL LINE: ICD-10-PCS | Mod: ,,, | Performed by: NURSE PRACTITIONER

## 2019-04-17 PROCEDURE — 37000009 HC ANESTHESIA EA ADD 15 MINS

## 2019-04-17 PROCEDURE — 20000000 HC ICU ROOM

## 2019-04-17 PROCEDURE — 94761 N-INVAS EAR/PLS OXIMETRY MLT: CPT

## 2019-04-17 PROCEDURE — 85025 COMPLETE CBC W/AUTO DIFF WBC: CPT

## 2019-04-17 PROCEDURE — 63600175 PHARM REV CODE 636 W HCPCS: Performed by: NURSE ANESTHETIST, CERTIFIED REGISTERED

## 2019-04-17 PROCEDURE — D9220A PRA ANESTHESIA: Mod: ANES,,, | Performed by: ANESTHESIOLOGY

## 2019-04-17 PROCEDURE — 99291 CRITICAL CARE FIRST HOUR: CPT | Mod: 25,,, | Performed by: PSYCHIATRY & NEUROLOGY

## 2019-04-17 PROCEDURE — 80053 COMPREHEN METABOLIC PANEL: CPT

## 2019-04-17 PROCEDURE — 36620 INSERTION CATHETER ARTERY: CPT | Mod: ,,, | Performed by: NURSE PRACTITIONER

## 2019-04-17 PROCEDURE — 99252 PR INITIAL INPATIENT CONSULT,LEVL II: ICD-10-PCS | Mod: ,,, | Performed by: NURSE PRACTITIONER

## 2019-04-17 PROCEDURE — D9220A PRA ANESTHESIA: ICD-10-PCS | Mod: CRNA,,, | Performed by: NURSE ANESTHETIST, CERTIFIED REGISTERED

## 2019-04-17 PROCEDURE — 84100 ASSAY OF PHOSPHORUS: CPT

## 2019-04-17 PROCEDURE — 63600175 PHARM REV CODE 636 W HCPCS: Performed by: NURSE PRACTITIONER

## 2019-04-17 PROCEDURE — 25000003 PHARM REV CODE 250: Performed by: NURSE ANESTHETIST, CERTIFIED REGISTERED

## 2019-04-17 PROCEDURE — 99233 SBSQ HOSP IP/OBS HIGH 50: CPT | Mod: ,,, | Performed by: PSYCHIATRY & NEUROLOGY

## 2019-04-17 PROCEDURE — 92610 EVALUATE SWALLOWING FUNCTION: CPT

## 2019-04-17 PROCEDURE — D9220A PRA ANESTHESIA: Mod: CRNA,,, | Performed by: NURSE ANESTHETIST, CERTIFIED REGISTERED

## 2019-04-17 PROCEDURE — D9220A PRA ANESTHESIA: ICD-10-PCS | Mod: ANES,,, | Performed by: ANESTHESIOLOGY

## 2019-04-17 PROCEDURE — 83735 ASSAY OF MAGNESIUM: CPT

## 2019-04-17 PROCEDURE — 25000003 PHARM REV CODE 250: Performed by: NURSE PRACTITIONER

## 2019-04-17 PROCEDURE — 99233 PR SUBSEQUENT HOSPITAL CARE,LEVL III: ICD-10-PCS | Mod: ,,, | Performed by: PSYCHIATRY & NEUROLOGY

## 2019-04-17 PROCEDURE — 37000008 HC ANESTHESIA 1ST 15 MINUTES

## 2019-04-17 PROCEDURE — 25000003 PHARM REV CODE 250: Performed by: STUDENT IN AN ORGANIZED HEALTH CARE EDUCATION/TRAINING PROGRAM

## 2019-04-17 PROCEDURE — 99252 IP/OBS CONSLTJ NEW/EST SF 35: CPT | Mod: ,,, | Performed by: NURSE PRACTITIONER

## 2019-04-17 PROCEDURE — 25000003 PHARM REV CODE 250: Performed by: PSYCHIATRY & NEUROLOGY

## 2019-04-17 PROCEDURE — 27000221 HC OXYGEN, UP TO 24 HOURS

## 2019-04-17 PROCEDURE — 99291 PR CRITICAL CARE, E/M 30-74 MINUTES: ICD-10-PCS | Mod: 25,,, | Performed by: PSYCHIATRY & NEUROLOGY

## 2019-04-17 RX ORDER — HYDRALAZINE HYDROCHLORIDE 20 MG/ML
10 INJECTION INTRAMUSCULAR; INTRAVENOUS EVERY 4 HOURS PRN
Status: DISCONTINUED | OUTPATIENT
Start: 2019-04-17 | End: 2019-04-28 | Stop reason: HOSPADM

## 2019-04-17 RX ORDER — IBUPROFEN 200 MG
16 TABLET ORAL
Status: DISCONTINUED | OUTPATIENT
Start: 2019-04-17 | End: 2019-04-18

## 2019-04-17 RX ORDER — MIDAZOLAM HYDROCHLORIDE 1 MG/ML
INJECTION, SOLUTION INTRAMUSCULAR; INTRAVENOUS
Status: DISCONTINUED | OUTPATIENT
Start: 2019-04-17 | End: 2019-04-17

## 2019-04-17 RX ORDER — NICARDIPINE HYDROCHLORIDE 0.2 MG/ML
INJECTION INTRAVENOUS CONTINUOUS PRN
Status: DISCONTINUED | OUTPATIENT
Start: 2019-04-17 | End: 2019-04-17

## 2019-04-17 RX ORDER — METOCLOPRAMIDE HYDROCHLORIDE 5 MG/ML
10 INJECTION INTRAMUSCULAR; INTRAVENOUS ONCE
Status: COMPLETED | OUTPATIENT
Start: 2019-04-17 | End: 2019-04-17

## 2019-04-17 RX ORDER — PROPOFOL 10 MG/ML
VIAL (ML) INTRAVENOUS
Status: DISCONTINUED | OUTPATIENT
Start: 2019-04-17 | End: 2019-04-17

## 2019-04-17 RX ORDER — LIDOCAINE HCL/PF 100 MG/5ML
SYRINGE (ML) INTRAVENOUS
Status: DISCONTINUED | OUTPATIENT
Start: 2019-04-17 | End: 2019-04-17

## 2019-04-17 RX ORDER — SCOLOPAMINE TRANSDERMAL SYSTEM 1 MG/1
1 PATCH, EXTENDED RELEASE TRANSDERMAL ONCE
Status: COMPLETED | OUTPATIENT
Start: 2019-04-17 | End: 2019-04-20

## 2019-04-17 RX ORDER — ROCURONIUM BROMIDE 10 MG/ML
INJECTION, SOLUTION INTRAVENOUS
Status: DISCONTINUED | OUTPATIENT
Start: 2019-04-17 | End: 2019-04-17

## 2019-04-17 RX ORDER — INSULIN ASPART 100 [IU]/ML
0-5 INJECTION, SOLUTION INTRAVENOUS; SUBCUTANEOUS
Status: DISCONTINUED | OUTPATIENT
Start: 2019-04-17 | End: 2019-04-18

## 2019-04-17 RX ORDER — NICARDIPINE HYDROCHLORIDE 0.2 MG/ML
5 INJECTION INTRAVENOUS CONTINUOUS
Status: DISCONTINUED | OUTPATIENT
Start: 2019-04-17 | End: 2019-04-17

## 2019-04-17 RX ORDER — GLUCAGON 1 MG
1 KIT INJECTION
Status: DISCONTINUED | OUTPATIENT
Start: 2019-04-17 | End: 2019-04-18

## 2019-04-17 RX ORDER — FENTANYL CITRATE 50 UG/ML
INJECTION, SOLUTION INTRAMUSCULAR; INTRAVENOUS
Status: DISCONTINUED | OUTPATIENT
Start: 2019-04-17 | End: 2019-04-17

## 2019-04-17 RX ORDER — SODIUM CHLORIDE 0.9 % (FLUSH) 0.9 %
10 SYRINGE (ML) INJECTION
Status: DISCONTINUED | OUTPATIENT
Start: 2019-04-17 | End: 2019-04-28 | Stop reason: HOSPADM

## 2019-04-17 RX ORDER — IBUPROFEN 200 MG
24 TABLET ORAL
Status: DISCONTINUED | OUTPATIENT
Start: 2019-04-17 | End: 2019-04-18

## 2019-04-17 RX ORDER — PHENYLEPHRINE HYDROCHLORIDE 10 MG/ML
INJECTION INTRAVENOUS
Status: DISCONTINUED | OUTPATIENT
Start: 2019-04-17 | End: 2019-04-17

## 2019-04-17 RX ADMIN — STANDARDIZED SENNA CONCENTRATE AND DOCUSATE SODIUM 1 TABLET: 8.6; 5 TABLET, FILM COATED ORAL at 08:04

## 2019-04-17 RX ADMIN — NIMODIPINE 60 MG: 30 CAPSULE, LIQUID FILLED ORAL at 05:04

## 2019-04-17 RX ADMIN — LEVETIRACETAM 500 MG: 5 INJECTION INTRAVENOUS at 08:04

## 2019-04-17 RX ADMIN — SCOPALAMINE 1 PATCH: 1 PATCH, EXTENDED RELEASE TRANSDERMAL at 08:04

## 2019-04-17 RX ADMIN — ACETAMINOPHEN 650 MG: 325 TABLET ORAL at 02:04

## 2019-04-17 RX ADMIN — ROCURONIUM BROMIDE 40 MG: 10 INJECTION, SOLUTION INTRAVENOUS at 06:04

## 2019-04-17 RX ADMIN — ROCURONIUM BROMIDE 20 MG: 10 INJECTION, SOLUTION INTRAVENOUS at 06:04

## 2019-04-17 RX ADMIN — LIDOCAINE HYDROCHLORIDE 75 MG: 20 INJECTION, SOLUTION INTRAVENOUS at 06:04

## 2019-04-17 RX ADMIN — NICARDIPINE HYDROCHLORIDE 2.5 MG/HR: 0.2 INJECTION, SOLUTION INTRAVENOUS at 06:04

## 2019-04-17 RX ADMIN — SUGAMMADEX 200 MG: 100 INJECTION, SOLUTION INTRAVENOUS at 07:04

## 2019-04-17 RX ADMIN — PHENYLEPHRINE HYDROCHLORIDE 50 MCG: 10 INJECTION INTRAVENOUS at 07:04

## 2019-04-17 RX ADMIN — NIMODIPINE 60 MG: 30 CAPSULE, LIQUID FILLED ORAL at 10:04

## 2019-04-17 RX ADMIN — SODIUM CHLORIDE, SODIUM GLUCONATE, SODIUM ACETATE, POTASSIUM CHLORIDE, MAGNESIUM CHLORIDE, SODIUM PHOSPHATE, DIBASIC, AND POTASSIUM PHOSPHATE: .53; .5; .37; .037; .03; .012; .00082 INJECTION, SOLUTION INTRAVENOUS at 06:04

## 2019-04-17 RX ADMIN — FENTANYL CITRATE 100 MCG: 50 INJECTION, SOLUTION INTRAMUSCULAR; INTRAVENOUS at 06:04

## 2019-04-17 RX ADMIN — NIMODIPINE 60 MG: 30 CAPSULE, LIQUID FILLED ORAL at 02:04

## 2019-04-17 RX ADMIN — ROCURONIUM BROMIDE 10 MG: 10 INJECTION, SOLUTION INTRAVENOUS at 07:04

## 2019-04-17 RX ADMIN — PROPOFOL 150 MG: 10 INJECTION, EMULSION INTRAVENOUS at 06:04

## 2019-04-17 RX ADMIN — ACETAMINOPHEN 650 MG: 325 TABLET ORAL at 08:04

## 2019-04-17 RX ADMIN — SODIUM CHLORIDE: 0.9 INJECTION, SOLUTION INTRAVENOUS at 05:04

## 2019-04-17 RX ADMIN — NICARDIPINE HYDROCHLORIDE 15 MG/HR: 0.2 INJECTION, SOLUTION INTRAVENOUS at 12:04

## 2019-04-17 RX ADMIN — METOCLOPRAMIDE 10 MG: 5 INJECTION, SOLUTION INTRAMUSCULAR; INTRAVENOUS at 01:04

## 2019-04-17 RX ADMIN — HYDRALAZINE HYDROCHLORIDE 10 MG: 20 INJECTION INTRAMUSCULAR; INTRAVENOUS at 12:04

## 2019-04-17 RX ADMIN — MIDAZOLAM HYDROCHLORIDE 2 MG: 1 INJECTION, SOLUTION INTRAMUSCULAR; INTRAVENOUS at 06:04

## 2019-04-17 RX ADMIN — PROPOFOL 50 MG: 10 INJECTION, EMULSION INTRAVENOUS at 06:04

## 2019-04-17 NOTE — CONSULTS
62 yo lady w aneurysmal SAH.  L ICA 1.6 cm aneurysm.    Plan for cerebral angio +/- embolization under anesthesia.    ASA: 3  Mall: 4 (n/a)    Levi Cortez MD  Vascular and Interventional Neurology Staff  Director of [facility]  [facility]  [phone]

## 2019-04-17 NOTE — ASSESSMENT & PLAN NOTE
Patient with complaint of sudden onset of HA this AM. Seen at [facility] for severe headache and photophobia. CTA completed which revealed left ICA saccular aneurysm. Patient transferred to [facility] for further neurological evaluation. Admitted to [facility]. NSx consulted.    MRI revealing SAH within interpeduncular fossa and the anterior basal cisterns. Small amount of IVH in the occipital horn of the R lateral ventricle. Patient s/p L ICA embolization w/ coiling completed in IR 4/17.    Continue Nimotop and daily TCDs    Antithrombotics for secondary stroke prevention:  None due to aneurysm and potential SAH     Statins for secondary stroke prevention and hyperlipidemia, if present:   Continue Atorvastatin 40 mg     Aggressive risk factor modification: family hx of aneurysm, L ICA aneusym      Rehab efforts: PT/OT/SLP to evaluate and treat    Diagnostics ordered/pending: CT scan of head without contrast to asses brain parenchyma, HgbA1C to assess blood glucose levels, Lipid Profile to assess cholesterol levels, MRI head without contrast to assess brain parenchyma, TTE to assess cardiac function/status , TSH to assess thyroid function     VTE prophylaxis: None due to potential SAH; mechanical SCDs     BP parameters: SAH: Secured aneurysm, no target, increase BP to prevent vasospasm if needed

## 2019-04-17 NOTE — ANESTHESIA PREPROCEDURE EVALUATION
[facility]  Anesthesia Pre-Operative Evaluation         Patient Name: Adriana Fonseca  YOB: 1955  MRN: [id number]    SUBJECTIVE:     Pre-operative evaluation for Procedure(s) (LRB):  ANGIOGRAM-CEREBRAL (Bilateral)     04/17/2019    Adriana Fonseca is a 63 y.o. female w/ a significant PMHx of PONV, HTN, HLD, hypothyroidism, that presents as a transfer from [facility]) for management of intracranial aneurysm.  Patient presented to [facility] ED with complaints of sudden onset severe headache with neck stiffness that began around 0800 4/16/19. She acknowledges, headache, visual disturbances, nausea without vomiting, but denies numbness and tingling.     MRI shows SAH and 1.6 cm saccular aneurysm arising from the supraclinoid segment of the left ICA    Patient now presents for the above procedure(s).      LDA: None documented.       Peripheral IV - Single Lumen 04/16/19 1638 20 G Right Antecubital (Active)   Site Assessment Clean;Dry;Intact;No redness;No swelling 4/17/2019  3:01 AM   Line Status Blood return noted;Flushed;Infusing 4/17/2019  3:01 AM   Dressing Status Clean;Dry;Intact 4/17/2019  3:01 AM   Dressing Intervention Dressing reinforced 4/17/2019  3:01 AM   Dressing Change Due 04/20/19 4/17/2019  3:01 AM   Site Change Due 04/20/19 4/17/2019  3:01 AM   Reason Not Rotated Not due 4/17/2019  3:01 AM   Number of days: 0            Peripheral IV - Single Lumen 04/16/19 1655 18 G Left Antecubital (Active)   Site Assessment Clean;Dry;Intact;No redness;No swelling 4/17/2019  3:01 AM   Line Status Blood return noted;Flushed;Saline locked 4/17/2019  3:01 AM   Dressing Status Clean;Dry;Intact 4/17/2019  3:01 AM   Dressing Intervention Dressing reinforced 4/17/2019  3:01 AM   Dressing Change Due 04/20/19 4/17/2019  3:01 AM   Site Change Due 04/20/19 4/17/2019  3:01 AM   Reason Not Rotated Not due 4/17/2019  3:01 AM   Number of days: 0        Female External Urinary Catheter 04/16/19 2020 (Active)   Skin no redness;no breakdown;perineum cleansed w/ soap and water 4/17/2019  3:01 AM   Tolerance no signs/symptoms of discomfort 4/17/2019  3:01 AM   Suction Continuous suction at 40 mmHg 4/16/2019 11:01 PM   Date of last wick change 04/16/19 4/16/2019 11:01 PM   Time of last wick change 2020 4/16/2019 11:01 PM   Output (mL) 200 mL 4/17/2019  3:01 AM   Number of days: 0       Prev airway: None documented.    Drips: None documented.   sodium chloride 0.9% 75 mL/hr at 04/17/19 0401    niCARdipine 12.5 mg/hr (04/17/19 0430)       Patient Active Problem List   Diagnosis    SAH (subarachnoid hemorrhage)    Essential hypertension    Headache    Thyroid disease       Review of patient's allergies indicates:  No Known Allergies    Current Inpatient Medications:   diphenhydrAMINE  12.5 mg Intravenous ED 1 Time    levetiracetam IVPB  500 mg Intravenous Q12H    niMODipine  60 mg Oral Q4H    senna-docusate 8.6-50 mg  1 tablet Oral BID       No current facility-administered medications on file prior to encounter.      No current outpatient medications on file prior to encounter.       Past Surgical History:   Procedure Laterality Date    HYSTERECTOMY         Social History     Socioeconomic History    Marital status:      Spouse name: Not on file    Number of children: Not on file    Years of education: Not on file    Highest education level: Not on file   Occupational History    Not on file   Social Needs    Financial resource strain: Not on file    Food insecurity:     Worry: Not on file     Inability: Not on file    Transportation needs:     Medical: Not on file     Non-medical: Not on file   Tobacco Use    Smoking status: Never Smoker    Smokeless tobacco: Never Used   Substance and Sexual Activity    Alcohol use: Not Currently    Drug use: Never    Sexual activity: Not on file   Lifestyle    Physical activity:     Days per week:  Not on file     Minutes per session: Not on file    Stress: Not on file   Relationships    Social connections:     Talks on phone: Not on file     Gets together: Not on file     Attends Yarsanism service: Not on file     Active member of club or organization: Not on file     Attends meetings of clubs or organizations: Not on file     Relationship status: Not on file   Other Topics Concern    Not on file   Social History Narrative    Not on file       OBJECTIVE:     Vital Signs Range (Last 24H):  Temp:  [36.9 °C (98.4 °F)-37.2 °C (99 °F)]   Pulse:  [70-94]   Resp:  [13-20]   BP: (124-147)/(55-70)   SpO2:  [93 %-99 %]   Arterial Line BP: (137-142)/(45-48)       Significant Labs:  Lab Results   Component Value Date    WBC 10.71 04/17/2019    HGB 13.3 04/17/2019    HCT 39.3 04/17/2019     04/17/2019    CHOL 178 04/16/2019    TRIG 71 04/16/2019    HDL 62 04/16/2019    ALT 22 04/17/2019    AST 23 04/17/2019     04/17/2019    K 3.7 04/17/2019     04/17/2019    CREATININE 0.9 04/17/2019    BUN 13 04/17/2019    CO2 20 (L) 04/17/2019    TSH 0.864 04/16/2019    INR 1.0 04/16/2019    HGBA1C 5.5 04/16/2019       Diagnostic Studies: No relevant studies.    EKG: No recent studies available.    2D ECHO:  No results found for this or any previous visit.      ASSESSMENT/PLAN:         Anesthesia Evaluation    I have reviewed the Patient Summary Reports.     I have reviewed the Medications.     Review of Systems  Anesthesia Hx:  History of prior surgery of interest to airway management or planning: Denies Family Hx of Anesthesia complications.  Personal Hx of Anesthesia complications, Post-Operative Nausea/Vomiting, with every anesthetic, despite treatment   Cardiovascular:   Hypertension Dysrhythmias  Hypertension  Disorder of Cardiac Rhythm, Paroxysmal Supraventricular Tachycardia (PSVT)    Neurological:  Intracranial Aneurysm  Intracranial Hemorrhage , IVH Grade Subarachnoid Hemorrhage    Endocrine:    Hypothyroidism        Physical Exam  General:  Well nourished    Airway/Jaw/Neck:  Airway Findings: Mouth Opening: Normal Tongue: Normal  General Airway Assessment: Adult  Mallampati: I  Improves to I with phonation.  TM Distance: Normal, at least 6 cm  Jaw/Neck Findings:  Neck ROM: Decreased flexion, Extension Decreased, Severe  Neck Findings: Normal    Eyes/Ears/Nose:  EYES/EARS/NOSE FINDINGS: Normal   Dental:  Dental Findings: In tact   Chest/Lungs:  Chest/Lungs Findings: Clear to auscultation, Normal Respiratory Rate     Heart/Vascular:  Heart Findings: Rate: Tachycardia        Mental Status:  Mental Status Findings: Normal        Anesthesia Plan  Type of Anesthesia, risks & benefits discussed:  Anesthesia Type:  general  Patient's Preference:   Intra-op Monitoring Plan: standard ASA monitors  Intra-op Monitoring Plan Comments:   Post Op Pain Control Plan: multimodal analgesia, IV/PO Opioids PRN and per primary service following discharge from PACU  Post Op Pain Control Plan Comments:   Induction:   IV  Beta Blocker:  Patient is not currently on a Beta-Blocker (No further documentation required).       Informed Consent: Patient understands risks and agrees with Anesthesia plan.  Questions answered. Anesthesia consent signed with patient.  ASA Score: 4     Day of Surgery Review of History & Physical:    H&P update referred to the surgeon.         Ready For Surgery From Anesthesia Perspective.

## 2019-04-17 NOTE — NURSING
Patient arrived to [facility] ED via airmed/acadian to room 9090  Type of stroke/diagnosis:  SAH from L ICA saccular aneurysm      Current symptoms: HA and neck pain    Skin assessment done: Yes  Wounds noted: none    NCC notified: ALTAF Lomeli     Will continue to monitor and treat per POC

## 2019-04-17 NOTE — PLAN OF CARE
04/17/19 1540   Discharge Assessment   Assessment Type Discharge Planning Assessment   Confirmed/corrected address and phone number on facesheet? Yes   Assessment information obtained from? Caregiver   Expected Length of Stay (days) 14   Communicated expected length of stay with patient/caregiver yes   Prior to hospitilization cognitive status: Alert/Oriented   Prior to hospitalization functional status: Independent   Current cognitive status: Unable to Assess   Current Functional Status: Needs Assistance   Facility Arrived From: [facility] With spouse   Able to Return to Prior Arrangements yes   Is patient able to care for self after discharge? Unable to determine at this time (comments)   Who are your caregiver(s) and their phone number(s)? Gabo Fonseca (spouse) [phone]   Patient's perception of discharge disposition other (comments)  (diana)   Readmission Within the Last 30 Days no previous admission in last 30 days   Patient currently receives any other outside agency services? No   Equipment Currently Used at Home none   Do you have any problems affording any of your prescribed medications? No   Is the patient taking medications as prescribed? yes   Does the patient have transportation home? Yes   Transportation Anticipated family or friend will provide   Does the patient receive services at the [facility]madin Clinic? No   Discharge Plan A Home with family   Discharge Plan B Home with family;Home Health   DME Needed Upon Discharge  other (see comments)  (tbd)   Patient/Family in Agreement with Plan yes         Discharge/ My Health Packet Folder Given to patient/family:      yes      PCP:    James Dunn MD      Pharmacy:    [facility]/pharmacy #[id number] - [place] - [address]  [address] [address]  Phone: [phone] Fax: [phone]    [facility] [id number] [facility] [employer] - [place] - [address]  [address] [place] [address]  Phone: [phone] Fax:  [phone]        Emergency Contacts:  Extended Emergency Contact Information  Primary Emergency Contact: joleen snell  Mobile Phone: [phone]  Relation: Daughter      Insurance:  Payor: [employer] / Plan: [facility] / Product Type: Commercial /     Gemini Hendrickson RN, CCRN-K, CCM  Neuro-Critical Care   X [id number]

## 2019-04-17 NOTE — PT/OT/SLP EVAL
"Speech Language Pathology Evaluation  Bedside Swallow    Patient Name:  Adriana Fonseca   MRN:  [id number]  Admitting Diagnosis: SAH (subarachnoid hemorrhage)    Recommendations:                 General Recommendations:  Cognitive-linguistic evaluation  Diet recommendations:  Regular, Thin   Aspiration Precautions: Feed only when awake/alert and Standard aspiration precautions   General Precautions: Standard, fall, aspiration  Communication strategies:  none    History:     Past Medical History:   Diagnosis Date    Hyperlipemia     Hypertension     Thyroid disease        Past Surgical History:   Procedure Laterality Date    HYSTERECTOMY         Social History: Patient lives with family.    Prior diet: regular with thin    Subjective     "My mouth is dry" per pt  Patient goals: did not state     Pain/Comfort:  · Pain Rating 1: 0/10  · Pain Rating Post-Intervention 1: 0/10    Objective:     Oral Musculature Evaluation  · Oral Musculature: WFL  · Dentition: present and adequate  · Secretion Management: adequate  · Mucosal Quality: adequate  · Mandibular Strength and Mobility: WFL  · Oral Labial Strength and Mobility: WFL  · Lingual Strength and Mobility: WFL  · Velar Elevation: WFL  · Buccal Strength and Mobility: WFL  · Volitional Cough: strong  · Volitional Swallow: no delay  · Voice Prior to PO Intake: wfl    Bedside Swallow Eval:   Consistencies Assessed:  · Thin liquids 3 teaspoons water, then 5 sips of water via cup  · Puree 2 teaspoons  · Solids 1/2 cracker     Oral Phase:   · WFL    Pharyngeal Phase:   · no overt clinical signs/symptoms of aspiration  · no overt clinical signs/symptoms of pharyngeal dysphagia    Compensatory Strategies  · None        Assessment:     Adriana Fonseca is a 63 y.o. female with oral and p haryngeal phases of swallow wfl   Goals:   Multidisciplinary Problems     SLP Goals        Problem: SLP Goal    Goal Priority Disciplines Outcome   SLP Goal     SLP Ongoing (interventions implemented " as appropriate)                   Plan:     · Patient to be seen:  4 x/week   · Plan of Care expires:  05/15/19  · Plan of Care reviewed with:  patient   · SLP Follow-Up:  Yes       Discharge recommendations:  (tbd)       Time Tracking:     SLP Treatment Date:   04/17/19  Speech Start Time:  1230  Speech Stop Time:  1240     Speech Total Time (min):  10 min    Billable Minutes: Eval Swallow and Oral Function 10    Anna Branham MA, CCC-SLP  04/17/2019

## 2019-04-17 NOTE — CONSULTS
[facility]  Physical Medicine & Rehab  Consult Note    Patient Name: Adriana Fonseca  MRN: [id number]  Admission Date: 4/16/2019  Hospital Length of Stay: 1 days  Attending Physician: Jose Joshi MD     Inpatient consult to Physical Medicine & Rehabilitation  Consult performed by: Franchesca Patel NP  Consult requested by:  Jose Joshi MD    Collaborating Physician: Stacey Macedo MD  Reason for Consult:  Assess rehabilitation needs     Consults  Subjective:     Principal Problem: SAH (subarachnoid hemorrhage)    HPI: Adriana Fonseca is a 63-year-old female with PMHx of HTN, HLD, Thyroid Disease. Presented to [facility] for sudden HA. CTA revealed a saccular aneurysm in L ICA. Transferred to [facility] on 4/16/19. Angiogram done 4/17 and successful. Hospital course complicated by HTN (Cardene gtt).      Functional History: Patient lives in [place] with her  in a single story home with threshold to enter.  Prior to admission, (I). DME: none.     Hospital Course: PT & OT Evaluations pending     Past Medical History:   Diagnosis Date    Hyperlipemia     Hypertension     Thyroid disease      Past Surgical History:   Procedure Laterality Date    HYSTERECTOMY       Review of patient's allergies indicates:  No Known Allergies    Scheduled Medications:    levetiracetam IVPB  500 mg Intravenous Q12H    niMODipine  60 mg Oral Q4H    scopolamine  1 patch Transdermal Once    senna-docusate 8.6-50 mg  1 tablet Oral BID       PRN Medications: acetaminophen, dextrose 50%, dextrose 50%, glucagon (human recombinant), glucose, glucose, hydrALAZINE, insulin aspart U-100, magnesium oxide, magnesium oxide, ondansetron, oxyCODONE, potassium chloride 10%, potassium chloride 10%, potassium chloride 10%, potassium, sodium phosphates, potassium, sodium phosphates, potassium, sodium phosphates, sodium chloride 0.9%, sodium chloride 0.9%    Family History     Problem Relation (Age of  Onset)    Aneurysm Father        Tobacco Use    Smoking status: Never Smoker    Smokeless tobacco: Never Used   Substance and Sexual Activity    Alcohol use: Not Currently    Drug use: Never    Sexual activity: Not on file     Review of Systems   Constitutional: Positive for fatigue. Negative for fever.   HENT: Negative for sore throat and trouble swallowing.    Eyes: Negative for visual disturbance.   Respiratory: Negative for cough and shortness of breath.    Cardiovascular: Negative for chest pain and leg swelling.   Gastrointestinal: Negative for abdominal distention and abdominal pain.   Musculoskeletal: Positive for gait problem. Negative for back pain.   Skin: Positive for wound (s/p angiogram). Negative for color change.   Neurological: Positive for weakness. Negative for dizziness, light-headedness and headaches.   Psychiatric/Behavioral: Negative for agitation, behavioral problems and confusion.     Objective:     Vital Signs (Most Recent):  Temp: 97.8 °F (36.6 °C) (04/17/19 1115)  Pulse: 88 (04/17/19 1315)  Resp: 14 (04/17/19 1315)  BP: 129/60 (04/17/19 0501)  SpO2: 100 % (04/17/19 1315)    Vital Signs (24h Range):  Temp:  [97.8 °F (36.6 °C)-99 °F (37.2 °C)] 97.8 °F (36.6 °C)  Pulse:  [] 88  Resp:  [12-20] 14  SpO2:  [93 %-100 %] 100 %  BP: (124-147)/(55-70) 129/60  Arterial Line BP: (133-171)/(34-60) 162/56     Body mass index is 27.43 kg/m².    Physical Exam   Constitutional: She appears well-developed and well-nourished.   HENT:   Head: Atraumatic.   Eyes: Pupils are equal, round, and reactive to light. EOM are normal.   Neck: Normal range of motion. Neck supple.   Cardiovascular: Normal rate and regular rhythm.   Pulmonary/Chest: Effort normal and breath sounds normal.   Abdominal: Soft. Normal appearance and bowel sounds are normal.   Musculoskeletal: Normal range of motion.   Neurological:   - sleeping, easily arousalable will continue to fall back asleep.   - following commands  - moving  all ext spontaneously  - Able to answer year and daughters name correctly    Skin: Skin is warm and dry.   Psychiatric: She has a normal mood and affect. Her behavior is normal.   Nursing note and vitals reviewed.    Diagnostic Results:   Labs: Reviewed  ECG: Reviewed  CT: Reviewed    Assessment/Plan:     * SAH (subarachnoid hemorrhage)  - CTA revealed a saccular aneurysm in L ICA. T  - Angiogram done 4/17 and successful.     See hospital course for functional, cognitive/speech/language, and nutrition/swallow status.      Recommendations  -  Encourage mobility, OOB in chair at least 3 hours per day, and early ambulation as appropriate   -  PT/OT evaluate and treat  -  SLP speech and cognitive evaluate and treat  -  Monitor sleep disturbances and establish consistent sleep-wake cycle  -  Monitor for bowel and bladder dysfunction  -  Monitor for shoulder pain and subluxation  -  Monitor for spasticity  -  Monitor for and prevent skin breakdown and pressure ulcers  · Early mobility, repositioning/weight shifting every 20-30 minutes when sitting, turn patient every 2 hours, proper mattress/overlay and chair cushioning, pressure relief/heel protector boots  -  DVT prophylaxis  -  Reviewed discharge options (IP rehab, SNF, HH therapy, and OP therapy)        Essential hypertension  - Cardene gtt      Therapy evaluations pending. Will follow progress and discuss with rehab team for post acute care/rehab recommendation.    Thank you for your consult.     Franchesca Patel NP  Department of Physical Medicine & Rehab  [facility]

## 2019-04-17 NOTE — PROGRESS NOTES
[facility]  Vascular Neurology  Comprehensive Stroke Center  Progress Note    Assessment/Plan:     * SAH (subarachnoid hemorrhage)  Patient with complaint of sudden onset of HA this AM. Seen at [facility] for severe headache and photophobia. CTA completed which revealed left ICA saccular aneurysm. Patient transferred to [facility] for further neurological evaluation. Admitted to [facility]. NSx consulted.    MRI revealing SAH within interpeduncular fossa and the anterior basal cisterns. Small amount of IVH in the occipital horn of the R lateral ventricle. Patient s/p L ICA embolization w/ coiling completed in IR 4/17.    Continue Nimotop and daily TCDs    Antithrombotics for secondary stroke prevention:  None due to aneurysm and potential SAH     Statins for secondary stroke prevention and hyperlipidemia, if present:   Continue Atorvastatin 40 mg     Aggressive risk factor modification: family hx of aneurysm, L ICA aneusym      Rehab efforts: PT/OT/SLP to evaluate and treat    Diagnostics ordered/pending: CT scan of head without contrast to asses brain parenchyma, HgbA1C to assess blood glucose levels, Lipid Profile to assess cholesterol levels, MRI head without contrast to assess brain parenchyma, TTE to assess cardiac function/status , TSH to assess thyroid function     VTE prophylaxis: None due to potential SAH; mechanical SCDs     BP parameters: SAH: Secured aneurysm, no target, increase BP to prevent vasospasm if needed         Vasogenic cerebral edema  Area of cytotoxic cerebral edema identified when reviewing brain imaging in the subarachnoid territory. There is not mass effect associated with it. We will continue to monitor the patients clinical exam for any worsening of symptoms which may indicate expansion of the stroke or the area of the edema resulting in the clinical change. The pattern is suggestive of L ICA aneurysm etiology.        Thyroid disease  TSH 0.864  Continue  home medication     Headache  Complaints of severe onset of headache   Initial rating 10/10  No complaints on exam this AM  Management per primary     Essential hypertension  Stroke risk factor  SBP <180 for secured aneurysm  [facility]   SBP within goal at this time          4/17 - MRI revealing subarachnoid blood within the interpeduncular fossa and anterior basal cisterns. Patient went to IR this AM for L ICA embolization w/ coiling. No complaints of headache on exam.     STROKE DOCUMENTATION        NIH Scale:  1a. Level of Consciousness: 0-->Alert, keenly responsive  1b. LOC Questions: 0-->Answers both questions correctly  1c. LOC Commands: 0-->Performs both tasks correctly  2. Best Gaze: 0-->Normal  3. Visual: 0-->No visual loss  4. Facial Palsy: 0-->Normal symmetrical movements  5a. Motor Arm, Left: 0-->No drift, limb holds 90 (or 45) degrees for full 10 secs  5b. Motor Arm, Right: 0-->No drift, limb holds 90 (or 45) degrees for full 10 secs  6a. Motor Leg, Left: 0-->No drift, leg holds 30 degree position for full 5 secs  6b. Motor Leg, Right: 0-->No drift, leg holds 30 degree position for full 5 secs  7. Limb Ataxia: 0-->Absent  8. Sensory: 0-->Normal, no sensory loss  9. Best Language: 0-->No aphasia, normal  10. Dysarthria: 0-->Normal  11. Extinction and Inattention (formerly Neglect): 0-->No abnormality  Total (NIH Stroke Scale): 0       Modified Milam Score: 0  Inez Coma Scale:    ABCD2 Score:    [id number] Score:   HAS -BLED Score:   ICH Score:   Hunt & Roth Classification:Grade I     Hemorrhagic change of an Ischemic Stroke: Does this patient have an ischemic stroke with hemorrhagic changes? No     Neurologic Chief Complaint: SAH    Subjective:     Interval History: Patient is seen for follow-up neurological assessment and treatment recommendations:     MRI revealing subarachnoid blood within the interpeduncular fossa and anterior basal cisterns. Patient went to IR this AM for L ICA  embolization w/ coiling. No complaints of headache on exam.     HPI, Past Medical, Family, and Social History remains the same as documented in the initial encounter.     Review of Systems   Constitutional: Negative for fever.   Eyes: Positive for photophobia.   Respiratory: Negative for shortness of breath.    Gastrointestinal: Negative for nausea and vomiting.   Musculoskeletal: Positive for neck pain.   Neurological: Positive for headaches. Negative for speech difficulty and weakness.   Psychiatric/Behavioral: Negative for agitation and confusion.     Scheduled Meds:   levetiracetam IVPB  500 mg Intravenous Q12H    niMODipine  60 mg Oral Q4H    scopolamine  1 patch Transdermal Once    senna-docusate 8.6-50 mg  1 tablet Oral BID     Continuous Infusions:   sodium chloride 0.9% 75 mL/hr at 04/17/19 0815    niCARdipine       PRN Meds:acetaminophen, dextrose 50%, dextrose 50%, glucagon (human recombinant), glucose, glucose, hydrALAZINE, insulin aspart U-100, magnesium oxide, magnesium oxide, ondansetron, oxyCODONE, potassium chloride 10%, potassium chloride 10%, potassium chloride 10%, potassium, sodium phosphates, potassium, sodium phosphates, potassium, sodium phosphates, sodium chloride 0.9%, sodium chloride 0.9%    Objective:     Vital Signs (Most Recent):  Temp: 98.3 °F (36.8 °C) (04/17/19 0815)  Pulse: 93 (04/17/19 1015)  Resp: 15 (04/17/19 1015)  BP: 129/60 (04/17/19 0501)  SpO2: 98 % (04/17/19 1015)  BP Location: Left arm    Vital Signs Range (Last 24H):  Temp:  [98.3 °F (36.8 °C)-99 °F (37.2 °C)]   Pulse:  []   Resp:  [12-20]   BP: (124-147)/(55-70)   SpO2:  [93 %-100 %]   Arterial Line BP: (133-171)/(34-60)   BP Location: Left arm    Physical Exam   Constitutional: She is oriented to person, place, and time. She appears well-developed.   HENT:   Head: Normocephalic and atraumatic.   Eyes: Pupils are equal, round, and reactive to light. EOM are normal.   Cardiovascular: Normal rate.    Pulmonary/Chest: Effort normal.   Musculoskeletal: Normal range of motion.   Neurological: She is alert and oriented to person, place, and time.   Skin: Skin is warm. Capillary refill takes less than 2 seconds.   Psychiatric: She has a normal mood and affect.       Neurological Exam:   LOC: alert  Attention Span: Good   Language: No aphasia  Articulation: No dysarthria  Orientation: Person, Place, Time   Visual Fields: Full  EOM (CN III, IV, VI): Full/intact  Pupils (CN II, III): PERRL  Facial Sensation (CN V): Normal  Motor: Arm left  Normal 5/5  Leg left  Normal 5/5  Arm right  Normal 5/5  Leg right Normal 5/5  Cebellar: No evidence of appendicular or axial ataxia  Sensation: Intact to light touch, temperature and vibration    Laboratory:  CMP:   Recent Labs   Lab 04/17/19 0135   CALCIUM 8.9   ALBUMIN 3.8   PROT 6.7      K 3.7   CO2 20*      BUN 13   CREATININE 0.9   ALKPHOS 75   ALT 22   AST 23   BILITOT 0.7     BMP:   Recent Labs   Lab 04/17/19 0135      K 3.7      CO2 20*   BUN 13   CREATININE 0.9   CALCIUM 8.9     CBC:   Recent Labs   Lab 04/17/19 0135   WBC 10.71   RBC 4.42   HGB 13.3   HCT 39.3      MCV 89   MCH 30.1   MCHC 33.8     Lipid Panel:   Recent Labs   Lab 04/16/19 1817   CHOL 178   LDLCALC 101.8   HDL 62   TRIG 71     Coagulation:   Recent Labs   Lab 04/16/19 1817   INR 1.0   APTT 25.0     Platelet Aggregation Study: No results for input(s): PLTAGG, PLTAGINTERP, PLTAGREGLACO, ADPPLTAGGREG in the last 168 hours.  Hgb A1C:   Recent Labs   Lab 04/16/19 1817   HGBA1C 5.5     TSH:   Recent Labs   Lab 04/16/19 1817   TSH 0.864       Diagnostic Results     Brain Imaging   MRI brain 4/16  Complete lack of CSF suppression within the interpeduncular fossa and the anterior basal cisterns, consistent with acute subarachnoid blood products.    Small amount of intraventricular blood in the occipital horn of the right lateral ventricle.  No evidence of  hydrocephalus.    Vessel Imaging   MRA brain 4/16   1.6 cm saccular aneurysm arising from the supraclinoid segment of the left ICA.  No additional aneurysm is identified, allowing for the low sensitivity of MRA for small aneurysms.  Please see the report of the reported outside CTA exam.  Additional evaluation, as clinically warranted.    Prelim angio-report 4/17  large sup hypo L ICA aneurysm, embolized successfully w coils    Cardiac Imaging   TTE pending       Pat Billy NP  [facility]  Department of Vascular Neurology   [facility]

## 2019-04-17 NOTE — PLAN OF CARE
Problem: Adult Inpatient Plan of Care  Goal: Plan of Care Review  Outcome: Ongoing (interventions implemented as appropriate)  Plan of care reviewed with Ms. Adriana Fonseca, , Gabo, and daughters at 0550. Patient and family able to verbalize/demonstrate understanding and all questions and concerns addressed. Arterial line placed. Cardene titrated to maintain SBP <140 (see flowsheet). MIVF started and continued at 75 mL/hr. Consents obtained for angio and anesthesia - placed in chart.      0600: Patient safely transported to IR for angiogram by 2 RNs while on cardiac monitoring and 2L portable O2. Handoff given to CRNA and RN assuming care. At this time, patient on Cardene at 2.5 mg/hr. Patient progressing toward goals. VS and assessments per flowsheets; will continue to monitor.

## 2019-04-17 NOTE — PROCEDURES
"Adriana Fonseca is a 63 y.o. female patient.    Temp: 98.6 °F (37 °C) (19)  Pulse: 73 (19)  Resp: 19 (19)  BP: 135/63 (19)  SpO2: 99 % (19)  Weight: 77.1 kg (169 lb 15.6 oz) (19)  Height: 5' 6" (167.6 cm) (19)       Arterial Line  Date/Time: 2019 12:41 AM  Performed by: Bobby Menchaca NP  Authorized by: Bobby Menchaca NP   Consent Done: Yes  Consent: Verbal consent obtained. Written consent obtained.  Consent given by: spouse.  Patient understanding: patient states understanding of the procedure being performed  Patient consent: the patient's understanding of the procedure matches consent given  Patient identity confirmed: , MRN and name  Preparation: Patient was prepped and draped in the usual sterile fashion.  Indications: multiple ABGs and hemodynamic monitoring  Location: right radial    Anesthesia:  Local Anesthetic: lidocaine 1% without epinephrine  Denis's test normal: yes  Needle gauge: 20  Number of attempts: 3  Complications: No  Specimens: No  Post-procedure: dressing applied  Post-procedure CMS: normal and unchanged  Patient tolerance: Patient tolerated the procedure well with no immediate complications          Bobby Menchaca  2019    "

## 2019-04-17 NOTE — NURSING
Pt arrived in room from IR coiling at 0815 following R fem angio approach. Pt connected to in room cardiac monitor. Cardene turned off for BP < 160. LAC iv leaking, removed. Pt placed on 2L NC O2.

## 2019-04-17 NOTE — PLAN OF CARE
Problem: Adult Inpatient Plan of Care  Goal: Plan of Care Review  Outcome: Ongoing (interventions implemented as appropriate)  Nutrition assessment completed. Please see RD note for details.    Recommendation/Intervention:   As medically able, recommend advancing diet to Regular with texture per SLP recommendations. If po intake suboptimal, add Boost Plus TID to increase caloric intake.     RD to monitor.    Goals: Pt to receive and tolerate >85% EEN and EPN by RD follow up  Nutrition Goal Status: new  Communication of RD Recs: reviewed with RN

## 2019-04-17 NOTE — ASSESSMENT & PLAN NOTE
Area of cytotoxic cerebral edema identified when reviewing brain imaging in the subarachnoid territory. There is not mass effect associated with it. We will continue to monitor the patients clinical exam for any worsening of symptoms which may indicate expansion of the stroke or the area of the edema resulting in the clinical change. The pattern is suggestive of L ICA aneurysm etiology.

## 2019-04-17 NOTE — ASSESSMENT & PLAN NOTE
Complaints of severe onset of headache   Initial rating 10/10  No complaints on exam this AM  Management per primary

## 2019-04-17 NOTE — PROGRESS NOTES
Hemostasis achieved via right groin with use of AngioSeal closure device at 0740.  Pt to lay flat for 2 hours until 0940

## 2019-04-17 NOTE — ANESTHESIA POSTPROCEDURE EVALUATION
Anesthesia Post Evaluation    Patient: Adriana Fonseca    Procedure(s) Performed: Procedure(s) (LRB):  ANGIOGRAM-CEREBRAL (Bilateral)    Final Anesthesia Type: general  Patient location during evaluation: PACU  Patient participation: Yes- Able to Participate  Level of consciousness: awake and alert and oriented  Post-procedure vital signs: reviewed and stable  Pain management: adequate  Airway patency: patent  PONV status at discharge: No PONV  Anesthetic complications: no      Cardiovascular status: hemodynamically stable  Respiratory status: unassisted  Hydration status: euvolemic  Follow-up not needed.          Vitals Value Taken Time   /57 4/17/2019  8:23 AM   Temp 36.6 °C (97.8 °F) 4/17/2019 11:15 AM   Pulse 87 4/17/2019 12:13 PM   Resp 15 4/17/2019 12:13 PM   SpO2 99 % 4/17/2019 12:13 PM   Vitals shown include unvalidated device data.      No case tracking events are documented in the log.      Pain/Carole Score: No data recorded

## 2019-04-17 NOTE — PROCEDURES
Radiology Post-Procedure Note    Pre Op Diagnosis: L ICA aneurysm    Post Op Diagnosis: same s/p embo    Procedure: Cerebral angiogram, aneurysm embolization    Procedure performed by: Levi Cortez MD    Written Informed Consent Obtained: Yes    Specimen Removed: NO    Estimated Blood Loss: less than 100     Procedure report:     A 8F sheath was placed into the right femoral artery and a 5F Henry catheter was advanced into the aortic arch.  The bilateral common, internal carotid and vertebral arteries were subselected and angiography of the brain was performed after injection into each of these vessels.    Preliminary interpretation: large sup hypo L ICA aneurysm, embolized successfully w coils.  Please see Imaging report for full details.    A right femoral artery angiogram was performed, the sheath removed and hemostasis achieved using angioseal.  No hematoma was present at the time of hemostasis.    The patient tolerated the procedure well.     Levi Cortez MD  Vascular and Interventional Neurology Staff  Director of [facility]  [facility]  [phone]

## 2019-04-17 NOTE — ASSESSMENT & PLAN NOTE
- Neurological checks q1h  - Seizure, fall, aspiration precautions  - Head of bed at 30 degrees at all times  - SBP goal 100-140   - Neurosurgery consulted  - CTH/CTA:  - MRI  - PT/OT/ST consults initiated  - TCD will be done daily  - Meds:  Nimodipine 60mg PO q4h  APAP 650 mg PO q6h PRN for pain or headache  Levetiracetam 5000mg bid (for seizure prophylaxis)  ___________________  Respiratory:  - Aspiration precautions, head of bed above 30 degrees  - PRN O2  - Baseline CXR  _______________________________________________  Cardiology:  - Continuous cardiac telemetry  - SBP goal 100-140   - TTE for baseline EF (to guide IVF, cardene and pressor therapy if required)  - Meds:  Nicardipine drip (target SBP < 140)  Hydralazine 10mg IV q4h prn SBP > 140  ________________________________________________  Renal:  - Renal function normal  - Monitor daily CMP, Mg, Phos  - Meds:  NS @ 75ml/h  _______________________________________________  Gastrointestinal:  - NPO for now till speech evaluation  - Meds:  1. Docusate 100 mg PO TID  ________________________________________________  Endocrinology:  -  NPO  - Check HgbA1c, TSH, T4 (Thyroid History)  ________________________________________________  Hematology:  - Monitor CBC daily  No anticoagulants, antiplatelets at this time  ______________________________________________  Infectious Disease:  - Current access: PIVs (placed)  - Keep normothermic  - Meds:  ?      1. APAP 650mg q6hr PRN for fever   _______________________________________________  Prophylaxis:  DVT: SCDs, no anticoagulation in the setting of recent subarachnoid bleed  GI: (hold for now)  ________________________________________________  Consults:  Vascular Neurology  Neurosurgery  Physical therapy  Occupational therapy  Speech therapy  Nutrition  Case Management

## 2019-04-17 NOTE — SUBJECTIVE & OBJECTIVE
Interval History:  Angio this AM  Review of Systems   Constitutional: Positive for activity change.   Eyes: Negative for visual disturbance.   Respiratory: Negative for shortness of breath.    Cardiovascular: Negative for chest pain.   Gastrointestinal: Negative for abdominal pain and vomiting.   Musculoskeletal: Negative for neck pain and neck stiffness.   Neurological: Positive for weakness and headaches.       Objective:     Vitals:  Temp: 98.3 °F (36.8 °C)  Pulse: 99  Rhythm: normal sinus rhythm  BP: 129/60  MAP (mmHg): 87  Resp: 18  SpO2: 97 %  O2 Device (Oxygen Therapy): nasal cannula    Temp  Min: 98.3 °F (36.8 °C)  Max: 99 °F (37.2 °C)  Pulse  Min: 70  Max: 104  BP  Min: 124/58  Max: 147/67  MAP (mmHg)  Min: 83  Max: 97  Resp  Min: 12  Max: 20  SpO2  Min: 93 %  Max: 100 %    04/16 0701 - 04/17 0700  In: 1255.2 [I.V.:1155.2]  Out: 650 [Urine:650]   Unmeasured Output  Urine Occurrence: 1  Pad Count: 2       Physical Exam   Constitutional: She appears well-developed.   HENT:   Head: Normocephalic and atraumatic.   Eyes: Pupils are equal, round, and reactive to light. EOM are normal.   Neck: Normal range of motion.   Cardiovascular: Normal rate.   Pulmonary/Chest: Effort normal.   Abdominal: Soft. Bowel sounds are normal.   Musculoskeletal: Normal range of motion.   Neurological:   Mental Status:  Awake, alert follows commands     Negative Aphasia Dysarthria Ataxia Apraxia   Pupils 3mm, PERRL.   +Corneal reflex, +gag, +cough   MACK Spontaneous  Equal Strength throughout          Unable to test coordination, gait due to level of consciousness.    Medications:  Continuous  sodium chloride 0.9% Last Rate: 75 mL/hr at 04/17/19 0501   niCARdipine Last Rate: Stopped (04/17/19 0815)   Scheduled  levetiracetam IVPB 500 mg Q12H   niMODipine 60 mg Q4H   scopolamine 1 patch Once   senna-docusate 8.6-50 mg 1 tablet BID   PRN  acetaminophen 650 mg Q6H PRN   hydrALAZINE 10 mg Q4H PRN   magnesium oxide 800 mg PRN   magnesium  oxide 800 mg PRN   ondansetron 4 mg Q6H PRN   oxyCODONE 5 mg Q6H PRN   potassium chloride 10% 40 mEq PRN   potassium chloride 10% 40 mEq PRN   potassium chloride 10% 60 mEq PRN   potassium, sodium phosphates 2 packet PRN   potassium, sodium phosphates 2 packet PRN   potassium, sodium phosphates 2 packet PRN   sodium chloride 0.9% 10 mL PRN   sodium chloride 0.9% 10 mL PRN     Today I personally reviewed pertinent medications, imaging, laboratory results, notably:    Diet  Diet NPO  Diet NPO

## 2019-04-17 NOTE — SUBJECTIVE & OBJECTIVE
Past Medical History:   Diagnosis Date    Hyperlipemia     Hypertension     Thyroid disease      Past Surgical History:   Procedure Laterality Date    HYSTERECTOMY       Review of patient's allergies indicates:  No Known Allergies    Scheduled Medications:    levetiracetam IVPB  500 mg Intravenous Q12H    niMODipine  60 mg Oral Q4H    scopolamine  1 patch Transdermal Once    senna-docusate 8.6-50 mg  1 tablet Oral BID       PRN Medications: acetaminophen, dextrose 50%, dextrose 50%, glucagon (human recombinant), glucose, glucose, hydrALAZINE, insulin aspart U-100, magnesium oxide, magnesium oxide, ondansetron, oxyCODONE, potassium chloride 10%, potassium chloride 10%, potassium chloride 10%, potassium, sodium phosphates, potassium, sodium phosphates, potassium, sodium phosphates, sodium chloride 0.9%, sodium chloride 0.9%    Family History     Problem Relation (Age of Onset)    Aneurysm Father        Tobacco Use    Smoking status: Never Smoker    Smokeless tobacco: Never Used   Substance and Sexual Activity    Alcohol use: Not Currently    Drug use: Never    Sexual activity: Not on file     Review of Systems   Constitutional: Positive for fatigue. Negative for fever.   HENT: Negative for sore throat and trouble swallowing.    Eyes: Negative for visual disturbance.   Respiratory: Negative for cough and shortness of breath.    Cardiovascular: Negative for chest pain and leg swelling.   Gastrointestinal: Negative for abdominal distention and abdominal pain.   Musculoskeletal: Positive for gait problem. Negative for back pain.   Skin: Positive for wound (s/p angiogram). Negative for color change.   Neurological: Positive for weakness. Negative for dizziness, light-headedness and headaches.   Psychiatric/Behavioral: Negative for agitation, behavioral problems and confusion.     Objective:     Vital Signs (Most Recent):  Temp: 97.8 °F (36.6 °C) (04/17/19 1115)  Pulse: 88 (04/17/19 1315)  Resp: 14 (04/17/19  1315)  BP: 129/60 (04/17/19 0501)  SpO2: 100 % (04/17/19 1315)    Vital Signs (24h Range):  Temp:  [97.8 °F (36.6 °C)-99 °F (37.2 °C)] 97.8 °F (36.6 °C)  Pulse:  [] 88  Resp:  [12-20] 14  SpO2:  [93 %-100 %] 100 %  BP: (124-147)/(55-70) 129/60  Arterial Line BP: (133-171)/(34-60) 162/56     Body mass index is 27.43 kg/m².    Physical Exam   Constitutional: She appears well-developed and well-nourished.   HENT:   Head: Atraumatic.   Eyes: Pupils are equal, round, and reactive to light. EOM are normal.   Neck: Normal range of motion. Neck supple.   Cardiovascular: Normal rate and regular rhythm.   Pulmonary/Chest: Effort normal and breath sounds normal.   Abdominal: Soft. Normal appearance and bowel sounds are normal.   Musculoskeletal: Normal range of motion.   Neurological:   - sleeping, easily arousalable will continue to fall back asleep.   - following commands  - moving all ext spontaneously  - Able to answer year and daughters name correctly    Skin: Skin is warm and dry.   Psychiatric: She has a normal mood and affect. Her behavior is normal.   Nursing note and vitals reviewed.    NEUROLOGICAL EXAMINATION:     CRANIAL NERVES     CN III, IV, VI   Pupils are equal, round, and reactive to light.  Extraocular motions are normal.       Diagnostic Results: Labs: Reviewed  ECG: Reviewed  CT: Reviewed

## 2019-04-17 NOTE — H&P
[facility]  Neurocritical Care  History & Physical    Admit Date: 2019  Service Date: 2019  Length of Stay: 0    Subjective:     Chief Complaint: SAH (subarachnoid hemorrhage)    History of Present Illness: The patient is a 63-year-old female with PMH of HTN, HLD, Thyroid Disease, that presents as a transfer from [facility] ([facility]) for management of intracranial aneurysm.  Patient presented to [facility] ED with complaints of sudden onset severe headache with neck stiffness that began at 8am today. She acknowledges, headache, visual disturbances, nausea without vomiting, but denies numbness and tingling.  CT head and CTA brain were obtained. 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  There was no evidence of acute intracranial hemorrhage.   She  endorses having taken IBU but with no relief. Denies anticoagulant use. Of note,  patient's father  or intracerebral aneurysm at an early age.        Past Medical History:   Diagnosis Date    Hyperlipemia     Hypertension     Thyroid disease      Past Surgical History:   Procedure Laterality Date    HYSTERECTOMY        No current facility-administered medications on file prior to encounter.      No current outpatient medications on file prior to encounter.      Allergies: Patient has no known allergies.    Family History   Problem Relation Age of Onset    Aneurysm Father      Social History     Tobacco Use    Smoking status: Never Smoker    Smokeless tobacco: Never Used   Substance Use Topics    Alcohol use: Not Currently    Drug use: Never     Review of Systems   Constitutional: Positive for activity change.   HENT: Negative for ear pain, hearing loss, sinus pain and trouble swallowing.    Eyes: Positive for photophobia and visual disturbance.   Respiratory: Negative for apnea, chest tightness and shortness of breath.    Cardiovascular: Negative for chest pain.   Gastrointestinal: Positive for  nausea. Negative for abdominal distention, abdominal pain and vomiting.   Genitourinary: Negative for flank pain.   Musculoskeletal: Positive for neck pain and neck stiffness. Negative for back pain.   Skin: Negative for color change.   Neurological: Positive for weakness and headaches. Negative for speech difficulty.   Psychiatric/Behavioral: The patient is nervous/anxious.      Objective:     Vitals:    Temp: 99 °F (37.2 °C)  Pulse: 85  BP: (!) 144/67  MAP (mmHg): 96  Resp: 19  SpO2: 95 %  O2 Device (Oxygen Therapy): nasal cannula    Temp  Min: 99 °F (37.2 °C)  Max: 99 °F (37.2 °C)  Pulse  Min: 85  Max: 92  BP  Min: 128/70  Max: 145/65  MAP (mmHg)  Min: 89  Max: 96  Resp  Min: 13  Max: 20  SpO2  Min: 94 %  Max: 97 %    No intake/output data recorded.           Physical Exam   Constitutional: She appears well-developed and well-nourished.   HENT:   Head: Normocephalic and atraumatic.   Eyes: Pupils are equal, round, and reactive to light. EOM are normal. Right eye exhibits no discharge. Left eye exhibits no discharge.   Neck: Normal range of motion. No JVD present. No tracheal deviation present.   Cardiovascular: Normal rate and regular rhythm.   Pulmonary/Chest: Effort normal and breath sounds normal. No respiratory distress.   Abdominal: Soft. Bowel sounds are normal. She exhibits no distension.   Musculoskeletal: Normal range of motion.   Neurological:   Alert oriented follows commands  Fluent; Negative Aphasia Dysarthria  Pupils equal and reactive, EOMI  no gaze preference or deviation  Sensory intact even throughout  no facial asymmetry  5/5 in both upper and lower extremities         Unable to test coordination, gait due to level of consciousness.    Today I personally reviewed pertinent medications, lines/drains/airways, imaging, laboratory results, notably:        Assessment/Plan:     Neuro  * SAH (subarachnoid hemorrhage)  - Neurological checks q1h  - Seizure, fall, aspiration precautions  - Head of bed at  30 degrees at all times  - SBP goal 100-140   - Neurosurgery consulted  - CTH/CTA:  - MRI  - PT/OT/ST consults initiated  - TCD will be done daily  - Meds:  Nimodipine 60mg PO q4h  APAP 650 mg PO q6h PRN for pain or headache  Levetiracetam 5000mg bid (for seizure prophylaxis)  ___________________  Respiratory:  - Aspiration precautions, head of bed above 30 degrees  - PRN O2  - Baseline CXR  _______________________________________________  Cardiology:  - Continuous cardiac telemetry  - SBP goal 100-140   - TTE for baseline EF (to guide IVF, cardene and pressor therapy if required)  - Meds:  Nicardipine drip (target SBP < 140)  Hydralazine 10mg IV q4h prn SBP > 140  ________________________________________________  Renal:  - Renal function normal  - Monitor daily CMP, Mg, Phos  - Meds:  NS @ 75ml/h  _______________________________________________  Gastrointestinal:  - NPO for now till speech evaluation  - Meds:  1. Docusate 100 mg PO TID  ________________________________________________  Endocrinology:  -  NPO  - Check HgbA1c, TSH, T4 (Thyroid History)  ________________________________________________  Hematology:  - Monitor CBC daily  No anticoagulants, antiplatelets at this time  ______________________________________________  Infectious Disease:  - Current access: PIVs (placed)  - Keep normothermic  - Meds:  ?      1. APAP 650mg q6hr PRN for fever   _______________________________________________  Prophylaxis:  DVT: SCDs, no anticoagulation in the setting of recent subarachnoid bleed  GI: (hold for now)  ________________________________________________  Consults:  Vascular Neurology  Neurosurgery  Physical therapy  Occupational therapy  Speech therapy  Nutrition  Case Management        Headache  Tylenol PRN  Oxycodone PRN    Cardiac/Vascular  Essential hypertension  Cardiac Monitoring  VS Q1 Hour  Maintain SBP < 140  Cardene drip to Maintain SBP < 140    Endocrine  Thyroid disease  History on Admit  TSH  Free  T4          The patient is being Prophylaxed for:  Venous Thromboembolism with: Mechanical  Stress Ulcer with: None  Ventilator Pneumonia with: none    Activity Orders          Diet NPO: NPO starting at 04/16 1810        Full Code   Critical Care Time 35 min    Alexandro De La Cruz NP  Neurocritical Care  [facility]

## 2019-04-17 NOTE — NURSING
Pt transferred to IR with RN x2 on cardiac monitoring and 2 L oxygen without incident. Handoff given to LUZ Estrella. Will continue to monitor and treat per POC

## 2019-04-17 NOTE — CONSULTS
[facility]  Vascular Neurology  Comprehensive Stroke Center  Consult Note    Inpatient consult to Vascular (Stroke) Neurology  Consult performed by: Pat Billy NP  Consult ordered by: Alexandro De La Cruz NP  Reason for consult: aneurysm         Assessment/Plan:     Patient is a 63 y.o. year old female with:    * SAH (subarachnoid hemorrhage)  Patient with complaint of sudden onset of HA this AM. Seen at [facility] for severe headache and photophobia. CTA completed which revealed left ICA saccular aneurysm. Patient transferred to [facility] for further neurological evaluation. Patient to be admitted to [facility]. NSx consulted.    Antithrombotics for secondary stroke prevention:  None due to aneurysm and potential SAH     Statins for secondary stroke prevention and hyperlipidemia, if present:   Continue Atorvastatin 40 mg     Aggressive risk factor modification: family hx of aneurysm, L ICA aneusym      Rehab efforts: PT/OT/SLP to evaluate and treat    Diagnostics ordered/pending: CT scan of head without contrast to asses brain parenchyma, HgbA1C to assess blood glucose levels, Lipid Profile to assess cholesterol levels, MRI head without contrast to assess brain parenchyma, TTE to assess cardiac function/status , TSH to assess thyroid function; angiogram     VTE prophylaxis: None due to potential SAH     BP parameters: SAH: Unsecured aneurysm, SBP <140        Headache  Complaints of severe onset of headache   Rating 10/10  Management per primary     Essential hypertension  Stroke risk factor  SBP <140 for unsecured aneurysm  Currently on Cardene         STROKE DOCUMENTATION          NIH Scale:  Interval: baseline  1a. Level of Consciousness: 0-->Alert, keenly responsive  1b. LOC Questions: 0-->Answers both questions correctly  1c. LOC Commands: 0-->Performs both tasks correctly  2. Best Gaze: 0-->Normal  3. Visual: 0-->No visual loss  4. Facial Palsy: 0-->Normal symmetrical  movements  5a. Motor Arm, Left: 0-->No drift, limb holds 90 (or 45) degrees for full 10 secs  5b. Motor Arm, Right: 0-->No drift, limb holds 90 (or 45) degrees for full 10 secs  6a. Motor Leg, Left: 0-->No drift, leg holds 30 degree position for full 5 secs  6b. Motor Leg, Right: 0-->No drift, leg holds 30 degree position for full 5 secs  7. Limb Ataxia: 0-->Absent  8. Sensory: 0-->Normal, no sensory loss  9. Best Language: 0-->No aphasia, normal  10. Dysarthria: 0-->Normal  11. Extinction and Inattention (formerly Neglect): 0-->No abnormality  Total (NIH Stroke Scale): 0    Modified Worcester Score: 0  Inez Coma Scale:    ABCD2 Score:    [id number] Score:   HAS -BLED Score:   ICH Score:   Hunt & Roth Classification:Grade I      Thrombolysis Candidate? No, CT findings (ICH, SAH, etc)       Interventional Revascularization Candidate?   Is the patient eligible for mechanical endovascular reperfusion (KELLY)?  No; No large vessel occlusion      Hemorrhagic change of an Ischemic Stroke: Does this patient have an ischemic stroke with hemorrhagic changes? No     Subjective:     History of Present Illness:  63 year old female with past medical hx of HTN, HLD, GERD, hypothyroidism presented to [facility] with complaints of headache and photophobia. She states this morning while at work she had onset of sudden headache. She rates the pain 10/10. Associated with the headache she also had neck stiffness and back pain. When she went home her family member came to see her and she was concerned she was having a stroke or a heart attack and brought her to the ED. Her father  due to intracerebral aneurysm. CTA was completed at outside facility revealing saccular aneurysm in the left ICA. Patient is not on anticoagulation.                 Past Medical History:   Diagnosis Date    Hyperlipemia     Hypertension     Thyroid disease      Past Surgical History:   Procedure Laterality Date    HYSTERECTOMY       Family  History   Problem Relation Age of Onset    Aneurysm Father      Social History     Tobacco Use    Smoking status: Never Smoker    Smokeless tobacco: Never Used   Substance Use Topics    Alcohol use: Not Currently    Drug use: Never     Review of patient's allergies indicates:  No Known Allergies    Medications: I have reviewed the current medication administration record.      (Not in a hospital admission)    Review of Systems   Constitutional: Negative for fever.   HENT: Negative for trouble swallowing.    Eyes: Positive for visual disturbance.   Respiratory: Negative for shortness of breath.    Cardiovascular: Negative for chest pain.   Gastrointestinal: Negative for nausea and vomiting.   Genitourinary: Negative for urgency.   Musculoskeletal: Negative for gait problem.   Skin: Negative for color change.   Neurological: Positive for headaches. Negative for speech difficulty and weakness.   Psychiatric/Behavioral: Negative for agitation and confusion.     Objective:     Vital Signs (Most Recent):  Temp: 99 °F (37.2 °C) (04/16/19 1637)  Pulse: 85 (04/16/19 1817)  Resp: 19 (04/16/19 1816)  BP: (!) 144/67 (04/16/19 1817)  SpO2: 95 % (04/16/19 1818)    Vital Signs Range (Last 24H):  Temp:  [99 °F (37.2 °C)]   Pulse:  [85-92]   Resp:  [13-20]   BP: (128-145)/(62-70)   SpO2:  [94 %-97 %]     Physical Exam   Constitutional: She is oriented to person, place, and time. She appears well-developed.   HENT:   Head: Normocephalic and atraumatic.   Eyes: Pupils are equal, round, and reactive to light. EOM are normal.   Cardiovascular: Normal rate.   Pulmonary/Chest: Effort normal.   Abdominal: Soft.   Musculoskeletal: Normal range of motion.   Neurological: She is alert and oriented to person, place, and time.   Skin: Skin is warm.   Psychiatric: She has a normal mood and affect.   Vitals reviewed.      Neurological Exam:   LOC: alert  Attention Span: Good   Language: No aphasia  Articulation: No dysarthria  Orientation:  Person, Place, Time   Visual Fields: Full  Pupils (CN II, III): PERRL  Facial Sensation (CN V): Normal  Facial Movement (CN VII): Symmetric facial expression    Motor: Arm left  Normal 5/5  Leg left  Normal 5/5  Arm right  Normal 5/5  Leg right Normal 5/5  Cebellar: No evidence of appendicular or axial ataxia  Sensation: Intact to light touch, temperature and vibration      Laboratory:  CMP: No results for input(s): GLUCOSE, CALCIUM, ALBUMIN, PROT, NA, K, CO2, CL, BUN, CREATININE, ALKPHOS, ALT, AST, BILITOT in the last 24 hours.  CBC:   Recent Labs   Lab 04/16/19  1817   WBC 6.44   RBC 4.62   HGB 13.8   HCT 40.6      MCV 88   MCH 29.9   MCHC 34.0     Lipid Panel: No results for input(s): CHOL, LDLCALC, HDL, TRIG in the last 168 hours.  Coagulation: No results for input(s): PT, INR, APTT in the last 168 hours.  Hgb A1C: No results for input(s): HGBA1C in the last 168 hours.  TSH: No results for input(s): TSH in the last 168 hours.    Diagnostic Results:      Brain imaging:  CT head pending    CTA outside facility revealing saccular aneurysm in the left ICA    Cardiac Evaluation:   TTE pending       Pat Billy NP  [facility]  Department of Vascular Neurology   [facility]

## 2019-04-17 NOTE — ASSESSMENT & PLAN NOTE
Stroke risk factor  SBP <180 for secured aneurysm  Cardene dc'ed per NCC   SBP within goal at this time

## 2019-04-17 NOTE — CONSULTS
"  [facility]  Adult Nutrition  Consult Note    SUMMARY     Recommendations    Recommendation/Intervention:   As medically able, recommend advancing diet to Regular with texture per SLP recommendations. If po intake suboptimal, add Boost Plus TID to increase caloric intake.     RD to monitor.    Goals: Pt to receive and tolerate >85% EEN and EPN by RD follow up  Nutrition Goal Status: new  Communication of RD Recs: reviewed with RN    Reason for Assessment    Reason For Assessment: consult  Diagnosis: hemorrhage  Relevant Medical History: HTN, HLD, thyroid disease  Interdisciplinary Rounds: attended  General Information Comments: Pt remains NPO. SLP recs for Regular/thin liquids. Unable to obtain full nutrition hx from pt x 2 attempts however pt appears nourished without physical signs of malnutrition.   Nutrition Discharge Planning: adequate po intake for optimal nutrition    Nutrition Risk Screen    Nutrition Risk Screen: no indicators present    Nutrition/Diet History    Spiritual, Cultural Beliefs, Lutheran Practices, Values that Affect Care: no  Factors Affecting Nutritional Intake: NPO    Anthropometrics    Temp: 97.8 °F (36.6 °C)  Height Method: Stated  Height: 5' 6" (167.6 cm)  Height (inches): 66 in  Weight Method: Bed Scale  Weight: 77.1 kg (169 lb 15.6 oz)  Weight (lb): 169.98 lb  Ideal Body Weight (IBW), Female: 130 lb  % Ideal Body Weight, Female (lb): 130.75 lb  BMI (Calculated): 27.5  BMI Grade: 25 - 29.9 - overweight       Lab/Procedures/Meds    Pertinent Labs Reviewed: reviewed  Pertinent Medications Reviewed: reviewed  Pertinent Medications Comments: IVF, metoclopramide, cardene    Estimated/Assessed Needs    Weight Used For Calorie Calculations: 77.1 kg (169 lb 15.6 oz)  Energy Calorie Requirements (kcal): 1671  Energy Need Method: Jbphh-St Jeor(PAL 1.25)  Protein Requirements: 77-93g(1.0-1.2g/kg)  Weight Used For Protein Calculations: 77.1 kg (169 lb 15.6 oz)  Fluid " Requirements (mL): 1mL/kcal or per MD     RDA Method (mL): 1671         Nutrition Prescription Ordered    Current Diet Order: NPO    Evaluation of Received Nutrient/Fluid Intake    IV Fluid (mL): 1800  % Intake of Estimated Energy Needs: 0 - 25 %  % Meal Intake: NPO    Nutrition Risk    Level of Risk/Frequency of Follow-up: (f/u 2x/week)     Assessment and Plan     Nutrition Problem  Inadequate energy intake    Related to (etiology):   NPO    Signs and Symptoms (as evidenced by):   Pt receiving <85% EEN and EPN.     Intervention:  Collaboration of nutrition care    Nutrition Diagnosis Status:   New        Monitor and Evaluation    Food and Nutrient Intake: energy intake, food and beverage intake  Food and Nutrient Adminstration: diet order  Anthropometric Measurements: weight, weight change, body mass index  Biochemical Data, Medical Tests and Procedures: electrolyte and renal panel, gastrointestinal profile, glucose/endocrine profile, inflammatory profile, lipid profile  Nutrition-Focused Physical Findings: overall appearance           Nutrition Follow-Up    RD Follow-up?: Yes

## 2019-04-17 NOTE — HOSPITAL COURSE
4/17 - MRI revealing subarachnoid blood within the interpeduncular fossa and anterior basal cisterns. Patient went to IR this AM for L ICA embolization w/ coiling. No complaints of headache on exam.  4/19 - NAEON, no headaches or vision changes. Neurologically stable.   4/22/19 - TCDs negative for vasospasm, headache present but treated with tylenol. No complains of weakness, numbness, photophobia or other new neurologic deficit   4/26/19 - plan to step down to neurosurgery, headaches improving

## 2019-04-17 NOTE — ASSESSMENT & PLAN NOTE
- CTA revealed a saccular aneurysm in L ICA. T  - Angiogram done 4/17 and successful.     See hospital course for functional, cognitive/speech/language, and nutrition/swallow status.      Recommendations  -  Encourage mobility, OOB in chair at least 3 hours per day, and early ambulation as appropriate   -  PT/OT evaluate and treat  -  SLP speech and cognitive evaluate and treat  -  Monitor sleep disturbances and establish consistent sleep-wake cycle  -  Monitor for bowel and bladder dysfunction  -  Monitor for shoulder pain and subluxation  -  Monitor for spasticity  -  Monitor for and prevent skin breakdown and pressure ulcers  · Early mobility, repositioning/weight shifting every 20-30 minutes when sitting, turn patient every 2 hours, proper mattress/overlay and chair cushioning, pressure relief/heel protector boots  -  DVT prophylaxis  -  Reviewed discharge options (IP rehab, SNF, HH therapy, and OP therapy)

## 2019-04-17 NOTE — HPI
Adriana Fonseca is a 63-year-old female with PMHx of HTN, HLD, Thyroid Disease. Presented to [facility] for sudden HA. CTA revealed a saccular aneurysm in L ICA. Transferred to [facility] on 4/16/19. Angiogram done 4/17 and successful. Hospital course complicated by HTN (Cardene gtt).      Functional History: Patient lives in [place] with her  in a single story home with threshold to enter.  Prior to admission, (I). DME: none.

## 2019-04-17 NOTE — PLAN OF CARE
Problem: SLP Goal  Goal: SLP Goal  Outcome: Ongoing (interventions implemented as appropriate)  Regular diet with thin liquids recommended.    Anna Branham MA/FRANCOISE-SLP  Speech Language Pathologist  Pager [phone]  4/17/2019

## 2019-04-17 NOTE — SUBJECTIVE & OBJECTIVE
Neurologic Chief Complaint: SAH    Subjective:     Interval History: Patient is seen for follow-up neurological assessment and treatment recommendations:     MRI revealing subarachnoid blood within the interpeduncular fossa and anterior basal cisterns. Patient went to IR this AM for L ICA embolization w/ coiling. No complaints of headache on exam.     HPI, Past Medical, Family, and Social History remains the same as documented in the initial encounter.     Review of Systems   Constitutional: Negative for fever.   Eyes: Positive for photophobia.   Respiratory: Negative for shortness of breath.    Gastrointestinal: Negative for nausea and vomiting.   Musculoskeletal: Positive for neck pain.   Neurological: Positive for headaches. Negative for speech difficulty and weakness.   Psychiatric/Behavioral: Negative for agitation and confusion.     Scheduled Meds:   levetiracetam IVPB  500 mg Intravenous Q12H    niMODipine  60 mg Oral Q4H    scopolamine  1 patch Transdermal Once    senna-docusate 8.6-50 mg  1 tablet Oral BID     Continuous Infusions:   sodium chloride 0.9% 75 mL/hr at 04/17/19 0815    niCARdipine       PRN Meds:acetaminophen, dextrose 50%, dextrose 50%, glucagon (human recombinant), glucose, glucose, hydrALAZINE, insulin aspart U-100, magnesium oxide, magnesium oxide, ondansetron, oxyCODONE, potassium chloride 10%, potassium chloride 10%, potassium chloride 10%, potassium, sodium phosphates, potassium, sodium phosphates, potassium, sodium phosphates, sodium chloride 0.9%, sodium chloride 0.9%    Objective:     Vital Signs (Most Recent):  Temp: 98.3 °F (36.8 °C) (04/17/19 0815)  Pulse: 93 (04/17/19 1015)  Resp: 15 (04/17/19 1015)  BP: 129/60 (04/17/19 0501)  SpO2: 98 % (04/17/19 1015)  BP Location: Left arm    Vital Signs Range (Last 24H):  Temp:  [98.3 °F (36.8 °C)-99 °F (37.2 °C)]   Pulse:  []   Resp:  [12-20]   BP: (124-147)/(55-70)   SpO2:  [93 %-100 %]   Arterial Line BP: (133-171)/(34-60)    BP Location: Left arm    Physical Exam   Constitutional: She is oriented to person, place, and time. She appears well-developed.   HENT:   Head: Normocephalic and atraumatic.   Eyes: Pupils are equal, round, and reactive to light. EOM are normal.   Cardiovascular: Normal rate.   Pulmonary/Chest: Effort normal.   Musculoskeletal: Normal range of motion.   Neurological: She is alert and oriented to person, place, and time.   Skin: Skin is warm. Capillary refill takes less than 2 seconds.   Psychiatric: She has a normal mood and affect.       Neurological Exam:   LOC: alert  Attention Span: Good   Language: No aphasia  Articulation: No dysarthria  Orientation: Person, Place, Time   Visual Fields: Full  EOM (CN III, IV, VI): Full/intact  Pupils (CN II, III): PERRL  Facial Sensation (CN V): Normal  Motor: Arm left  Normal 5/5  Leg left  Normal 5/5  Arm right  Normal 5/5  Leg right Normal 5/5  Cebellar: No evidence of appendicular or axial ataxia  Sensation: Intact to light touch, temperature and vibration    Laboratory:  CMP:   Recent Labs   Lab 04/17/19 0135   CALCIUM 8.9   ALBUMIN 3.8   PROT 6.7      K 3.7   CO2 20*      BUN 13   CREATININE 0.9   ALKPHOS 75   ALT 22   AST 23   BILITOT 0.7     BMP:   Recent Labs   Lab 04/17/19 0135      K 3.7      CO2 20*   BUN 13   CREATININE 0.9   CALCIUM 8.9     CBC:   Recent Labs   Lab 04/17/19 0135   WBC 10.71   RBC 4.42   HGB 13.3   HCT 39.3      MCV 89   MCH 30.1   MCHC 33.8     Lipid Panel:   Recent Labs   Lab 04/16/19 1817   CHOL 178   LDLCALC 101.8   HDL 62   TRIG 71     Coagulation:   Recent Labs   Lab 04/16/19 1817   INR 1.0   APTT 25.0     Platelet Aggregation Study: No results for input(s): PLTAGG, PLTAGINTERP, PLTAGREGLACO, ADPPLTAGGREG in the last 168 hours.  Hgb A1C:   Recent Labs   Lab 04/16/19 1817   HGBA1C 5.5     TSH:   Recent Labs   Lab 04/16/19 1817   TSH 0.864       Diagnostic Results     Brain Imaging   MRI brain  4/16  Complete lack of CSF suppression within the interpeduncular fossa and the anterior basal cisterns, consistent with acute subarachnoid blood products.    Small amount of intraventricular blood in the occipital horn of the right lateral ventricle.  No evidence of hydrocephalus.    Vessel Imaging   MRA brain 4/16   1.6 cm saccular aneurysm arising from the supraclinoid segment of the left ICA.  No additional aneurysm is identified, allowing for the low sensitivity of MRA for small aneurysms.  Please see the report of the reported outside CTA exam.  Additional evaluation, as clinically warranted.    Prelim angio-report 4/17  large sup hypo L ICA aneurysm, embolized successfully w coils    Cardiac Imaging   TTE pending

## 2019-04-17 NOTE — TRANSFER OF CARE
"Anesthesia Transfer of Care Note    Patient: Adriana Fonseca    Procedure(s) Performed: Procedure(s) (LRB):  ANGIOGRAM-CEREBRAL (Bilateral)    Patient location: ICU    Anesthesia Type: general    Transport from OR: Transported from OR on 6-10 L/min O2 by face mask with adequate spontaneous ventilation. Continuous ECG monitoring in transport. Continuous SpO2 monitoring in transport. Continuos invasive BP monitoring in transport    Post pain: adequate analgesia    Post assessment: no apparent anesthetic complications and tolerated procedure well    Post vital signs: stable    Level of consciousness: sedated, responds to stimulation and oriented    Nausea/Vomiting: no nausea/vomiting    Complications: none    Transfer of care protocol was followed      Last vitals:   Visit Vitals  /60 (BP Location: Left arm, Patient Position: Lying)   Pulse 104   Temp 37.1 °C (98.8 °F) (Oral)   Resp 20   Ht 5' 6" (1.676 m)   Wt 77.1 kg (169 lb 15.6 oz)   LMP  (LMP Unknown)   SpO2 97%   Breastfeeding? No   BMI 27.43 kg/m²     "

## 2019-04-17 NOTE — CONSULTS
Inpatient consult to Physical Medicine Rehab  Consult performed by: Franchesca Patel NP  Consult ordered by: Alexandro De La Cruz NP  Reason for consult: Assess rehab needs      Reviewed patient history and current admission.  Rehab team following.  Full consult to follow.    RIVERA Angelo, FNP-C  Physical Medicine & Rehabilitation   04/17/2019  Spectralink: [id number]

## 2019-04-17 NOTE — PT/OT/SLP PROGRESS
Occupational Therapy  Not Seen/HOLD      Patient Name:  Adriana Fonseca   MRN:  [id number]    Patient АНДРЕЙ to IR for angiogram (B) with coiling. 24 hour therapy HOLD.  Will follow up at later and more appropriate time (4/18/2019) for OT evaluation.    CLOVIS Castillo  4/17/2019

## 2019-04-17 NOTE — PROGRESS NOTES
Patient complaining of intermittent nausea, accompanied by dry heaving. SBP initially in 140's, now 80's-90's. Cardene infusion stopped at this time. Sheard, NP notified and presented to bedside to assess; following NP assessment, orders received to hold 0200 dose of Nimodipine and to administer a one time dose of 10 mg IV Reglan. Will implement orders and continue to monitor.

## 2019-04-18 LAB
ALBUMIN SERPL BCP-MCNC: 3.2 G/DL (ref 3.5–5.2)
ALP SERPL-CCNC: 65 U/L (ref 55–135)
ALT SERPL W/O P-5'-P-CCNC: 19 U/L (ref 10–44)
ANION GAP SERPL CALC-SCNC: 8 MMOL/L (ref 8–16)
AST SERPL-CCNC: 20 U/L (ref 10–40)
BASOPHILS # BLD AUTO: 0.03 K/UL (ref 0–0.2)
BASOPHILS NFR BLD: 0.3 % (ref 0–1.9)
BILIRUB SERPL-MCNC: 0.7 MG/DL (ref 0.1–1)
BUN SERPL-MCNC: 11 MG/DL (ref 8–23)
CALCIUM SERPL-MCNC: 8.2 MG/DL (ref 8.7–10.5)
CHLORIDE SERPL-SCNC: 111 MMOL/L (ref 95–110)
CO2 SERPL-SCNC: 21 MMOL/L (ref 23–29)
CREAT SERPL-MCNC: 0.7 MG/DL (ref 0.5–1.4)
DIFFERENTIAL METHOD: ABNORMAL
EOSINOPHIL # BLD AUTO: 0 K/UL (ref 0–0.5)
EOSINOPHIL NFR BLD: 0.4 % (ref 0–8)
ERYTHROCYTE [DISTWIDTH] IN BLOOD BY AUTOMATED COUNT: 13.2 % (ref 11.5–14.5)
EST. GFR  (AFRICAN AMERICAN): >60 ML/MIN/1.73 M^2
EST. GFR  (NON AFRICAN AMERICAN): >60 ML/MIN/1.73 M^2
GLUCOSE SERPL-MCNC: 113 MG/DL (ref 70–110)
HCT VFR BLD AUTO: 36.2 % (ref 37–48.5)
HGB BLD-MCNC: 12.2 G/DL (ref 12–16)
IMM GRANULOCYTES # BLD AUTO: 0.03 K/UL (ref 0–0.04)
IMM GRANULOCYTES NFR BLD AUTO: 0.3 % (ref 0–0.5)
LYMPHOCYTES # BLD AUTO: 1.3 K/UL (ref 1–4.8)
LYMPHOCYTES NFR BLD: 13.4 % (ref 18–48)
MAGNESIUM SERPL-MCNC: 2 MG/DL (ref 1.6–2.6)
MCH RBC QN AUTO: 30.1 PG (ref 27–31)
MCHC RBC AUTO-ENTMCNC: 33.7 G/DL (ref 32–36)
MCV RBC AUTO: 89 FL (ref 82–98)
MONOCYTES # BLD AUTO: 0.7 K/UL (ref 0.3–1)
MONOCYTES NFR BLD: 7.5 % (ref 4–15)
NEUTROPHILS # BLD AUTO: 7.7 K/UL (ref 1.8–7.7)
NEUTROPHILS NFR BLD: 78.1 % (ref 38–73)
NRBC BLD-RTO: 0 /100 WBC
PHOSPHATE SERPL-MCNC: 2.2 MG/DL (ref 2.7–4.5)
PLATELET # BLD AUTO: 233 K/UL (ref 150–350)
PMV BLD AUTO: 9.5 FL (ref 9.2–12.9)
POCT GLUCOSE: 113 MG/DL (ref 70–110)
POTASSIUM SERPL-SCNC: 3.4 MMOL/L (ref 3.5–5.1)
POTASSIUM SERPL-SCNC: 3.8 MMOL/L (ref 3.5–5.1)
PROT SERPL-MCNC: 6.1 G/DL (ref 6–8.4)
RBC # BLD AUTO: 4.05 M/UL (ref 4–5.4)
SODIUM SERPL-SCNC: 140 MMOL/L (ref 136–145)
WBC # BLD AUTO: 9.83 K/UL (ref 3.9–12.7)

## 2019-04-18 PROCEDURE — 99233 SBSQ HOSP IP/OBS HIGH 50: CPT | Mod: ,,, | Performed by: NEUROLOGICAL SURGERY

## 2019-04-18 PROCEDURE — 97165 OT EVAL LOW COMPLEX 30 MIN: CPT

## 2019-04-18 PROCEDURE — 84100 ASSAY OF PHOSPHORUS: CPT

## 2019-04-18 PROCEDURE — 99233 PR SUBSEQUENT HOSPITAL CARE,LEVL III: ICD-10-PCS | Mod: ,,, | Performed by: NURSE PRACTITIONER

## 2019-04-18 PROCEDURE — 25000003 PHARM REV CODE 250: Performed by: PSYCHIATRY & NEUROLOGY

## 2019-04-18 PROCEDURE — 20000000 HC ICU ROOM

## 2019-04-18 PROCEDURE — 99233 PR SUBSEQUENT HOSPITAL CARE,LEVL III: ICD-10-PCS | Mod: ,,, | Performed by: NEUROLOGICAL SURGERY

## 2019-04-18 PROCEDURE — 99233 SBSQ HOSP IP/OBS HIGH 50: CPT | Mod: ,,, | Performed by: NURSE PRACTITIONER

## 2019-04-18 PROCEDURE — 63600175 PHARM REV CODE 636 W HCPCS: Performed by: NURSE PRACTITIONER

## 2019-04-18 PROCEDURE — 25000003 PHARM REV CODE 250: Performed by: NURSE PRACTITIONER

## 2019-04-18 PROCEDURE — 92523 SPEECH SOUND LANG COMPREHEN: CPT

## 2019-04-18 PROCEDURE — 83735 ASSAY OF MAGNESIUM: CPT

## 2019-04-18 PROCEDURE — 80053 COMPREHEN METABOLIC PANEL: CPT

## 2019-04-18 PROCEDURE — 94761 N-INVAS EAR/PLS OXIMETRY MLT: CPT

## 2019-04-18 PROCEDURE — 97162 PT EVAL MOD COMPLEX 30 MIN: CPT

## 2019-04-18 PROCEDURE — 84132 ASSAY OF SERUM POTASSIUM: CPT

## 2019-04-18 PROCEDURE — 25000003 PHARM REV CODE 250: Performed by: STUDENT IN AN ORGANIZED HEALTH CARE EDUCATION/TRAINING PROGRAM

## 2019-04-18 PROCEDURE — 85025 COMPLETE CBC W/AUTO DIFF WBC: CPT

## 2019-04-18 RX ORDER — METHYLPREDNISOLONE SOD SUCC 125 MG
125 VIAL (EA) INJECTION ONCE AS NEEDED
Status: COMPLETED | OUTPATIENT
Start: 2019-04-18 | End: 2019-04-19

## 2019-04-18 RX ORDER — LEVETIRACETAM 500 MG/1
500 TABLET ORAL 2 TIMES DAILY
Status: COMPLETED | OUTPATIENT
Start: 2019-04-18 | End: 2019-04-23

## 2019-04-18 RX ORDER — AMOXICILLIN 250 MG
1 CAPSULE ORAL 2 TIMES DAILY
Status: DISCONTINUED | OUTPATIENT
Start: 2019-04-18 | End: 2019-04-28 | Stop reason: HOSPADM

## 2019-04-18 RX ORDER — POTASSIUM CHLORIDE 20 MEQ/1
40 TABLET, EXTENDED RELEASE ORAL
Status: COMPLETED | OUTPATIENT
Start: 2019-04-18 | End: 2019-04-18

## 2019-04-18 RX ORDER — HEPARIN SODIUM 5000 [USP'U]/ML
5000 INJECTION, SOLUTION INTRAVENOUS; SUBCUTANEOUS EVERY 8 HOURS
Status: DISCONTINUED | OUTPATIENT
Start: 2019-04-18 | End: 2019-04-28 | Stop reason: HOSPADM

## 2019-04-18 RX ADMIN — ACETAMINOPHEN 650 MG: 325 TABLET ORAL at 03:04

## 2019-04-18 RX ADMIN — NIMODIPINE 60 MG: 30 CAPSULE, LIQUID FILLED ORAL at 02:04

## 2019-04-18 RX ADMIN — NIMODIPINE 60 MG: 30 CAPSULE, LIQUID FILLED ORAL at 09:04

## 2019-04-18 RX ADMIN — SODIUM CHLORIDE: 0.9 INJECTION, SOLUTION INTRAVENOUS at 05:04

## 2019-04-18 RX ADMIN — STANDARDIZED SENNA CONCENTRATE AND DOCUSATE SODIUM 1 TABLET: 8.6; 5 TABLET, FILM COATED ORAL at 09:04

## 2019-04-18 RX ADMIN — HEPARIN SODIUM 5000 UNITS: 5000 INJECTION, SOLUTION INTRAVENOUS; SUBCUTANEOUS at 09:04

## 2019-04-18 RX ADMIN — NIMODIPINE 60 MG: 30 CAPSULE, LIQUID FILLED ORAL at 05:04

## 2019-04-18 RX ADMIN — ACETAMINOPHEN 650 MG: 325 TABLET ORAL at 09:04

## 2019-04-18 RX ADMIN — HEPARIN SODIUM 5000 UNITS: 5000 INJECTION, SOLUTION INTRAVENOUS; SUBCUTANEOUS at 02:04

## 2019-04-18 RX ADMIN — LEVETIRACETAM 500 MG: 500 TABLET ORAL at 09:04

## 2019-04-18 RX ADMIN — ACETAMINOPHEN 650 MG: 325 TABLET ORAL at 08:04

## 2019-04-18 RX ADMIN — LEVETIRACETAM 500 MG: 5 INJECTION INTRAVENOUS at 08:04

## 2019-04-18 RX ADMIN — POTASSIUM CHLORIDE 40 MEQ: 1500 TABLET, EXTENDED RELEASE ORAL at 03:04

## 2019-04-18 RX ADMIN — ACETAMINOPHEN 650 MG: 325 TABLET ORAL at 02:04

## 2019-04-18 RX ADMIN — POTASSIUM CHLORIDE 40 MEQ: 1500 TABLET, EXTENDED RELEASE ORAL at 09:04

## 2019-04-18 NOTE — PLAN OF CARE
Problem: Physical Therapy Goal  Goal: Physical Therapy Goal  Goals to be met by: 2019     Patient will increase functional independence with mobility by performin. Supine to sit with Jack  2. Sit to supine with Jack  3. Sit to stand transfer with Jack  4. Gait  x 150 feet with Supervision using no AD  5. Stand for 5 minutes while performing dynamic balance exercises without AD, without loss of balance to decrease risk of falls.  6. Lower extremity exercise program x20 reps per handout, with independence    Outcome: Ongoing (interventions implemented as appropriate)  Evaluation completed, initiated plan of care.   Mita Bahena, PT  2019

## 2019-04-18 NOTE — ASSESSMENT & PLAN NOTE
- Neurological checks q1h  - Seizure, fall, aspiration precautions  - Head of bed at 30 degrees at all times  Permissive HTN Post Secure Coiling  - TCD  daily  - Meds:  Nimodipine 60mg PO q4h  APAP 650 mg PO q6h PRN for pain or headache  Levetiracetam 5000mg bid (for seizure prophylaxis)  ___________________  Respiratory:  - Aspiration precautions, head of bed above 30 degrees  - PRN O2  ______________________________________________  Cardiology:  - Continuous cardiac telemetry  Permissive HTN  - TTE Stable, EF 60%  _______________________________________________  Renal:  - Renal function normal  - Monitor daily CMP, Mg, Phos  NS @ 75ml/h  _______________________________________________  Gastrointestinal:  - Meds:  1. Docusate 100 mg PO TID  ________________________________________________  Hematology:  - Monitor CBC daily   Sub Q heparin  ______________________________________________  Infectious Disease:  - Keep normothermic  - Meds:  ?      1. APAP 650mg q6hr PRN for fever   _______________________________________________  Prophylaxis:  DVT: SQ Heparin  GI: (hold for now)  ________________________________________________

## 2019-04-18 NOTE — SUBJECTIVE & OBJECTIVE
Interval History:  Stable overnight. More alert today. Continues to c/o HA. Tylenol ordered. One time dose of Solumedrol ordered if Tylenol ineffective. Start heparin SQ today. Continue Q1 hr neuro monitoring, SBP< 200, Nimitop and Daily TCD    Review of Systems   Constitutional: Positive for activity change.   Eyes: Negative for photophobia and visual disturbance.   Respiratory: Negative for chest tightness and shortness of breath.    Cardiovascular: Negative for chest pain.   Gastrointestinal: Negative for abdominal pain and nausea.   Genitourinary: Negative for flank pain.   Musculoskeletal: Positive for neck pain.   Neurological: Positive for weakness.   Psychiatric/Behavioral: Negative for agitation.       Objective:     Vitals:  Temp: 99.1 °F (37.3 °C)  Pulse: 77  Rhythm: normal sinus rhythm  BP: 127/61  MAP (mmHg): 87  Resp: (!) 25  SpO2: 97 %  O2 Device (Oxygen Therapy): room air    Temp  Min: 98.6 °F (37 °C)  Max: 99.5 °F (37.5 °C)  Pulse  Min: 76  Max: 89  BP  Min: 117/56  Max: 141/66  MAP (mmHg)  Min: 81  Max: 95  Resp  Min: 11  Max: 25  SpO2  Min: 93 %  Max: 100 %    04/17 0701 - 04/18 0700  In: 2473.8 [P.O.:100; I.V.:2173.8]  Out: 1200 [Urine:1200]   Unmeasured Output  Urine Occurrence: 1  Pad Count: 1       Physical Exam   Constitutional: She appears well-developed and well-nourished.   HENT:   Head: Normocephalic.   Eyes: Pupils are equal, round, and reactive to light. EOM are normal.   Neck: Normal range of motion.   Cardiovascular: Normal rate and regular rhythm.   Pulmonary/Chest: Effort normal. No respiratory distress.   Abdominal: Soft. She exhibits no distension.   Musculoskeletal: Normal range of motion.   Neurological:   Mental Status:  Awake, alert, follows commands     Negative Aphasia Dysarthria Ataxia Apraxia   Pupils 3 mm, PERRL.   +Corneal reflex, +gag, +cough   MACK Spontaneous  Minimal R side weakness         Unable to test coordination, gait due to level of  consciousness.    Medications:  Continuous  sodium chloride 0.9% Last Rate: 75 mL/hr at 04/18/19 1101   Scheduled  heparin (porcine) 5,000 Units Q8H   levETIRAcetam 500 mg BID   niMODipine 60 mg Q4H   scopolamine 1 patch Once   senna-docusate 8.6-50 mg 1 tablet BID   PRN  acetaminophen 650 mg Q6H PRN   hydrALAZINE 10 mg Q4H PRN   magnesium oxide 800 mg PRN   magnesium oxide 800 mg PRN   methylPREDNISolone sodium succinate 125 mg Once PRN   ondansetron 4 mg Q6H PRN   oxyCODONE 5 mg Q6H PRN   potassium chloride 10% 40 mEq PRN   potassium chloride 10% 40 mEq PRN   potassium chloride 10% 60 mEq PRN   potassium, sodium phosphates 2 packet PRN   potassium, sodium phosphates 2 packet PRN   potassium, sodium phosphates 2 packet PRN   sodium chloride 0.9% 10 mL PRN   sodium chloride 0.9% 10 mL PRN     Today I personally reviewed pertinent medications, laboratory results, notably:    Diet  Diet Adult Regular (IDDSI Level 7) [facility]; Thin  Diet Adult Regular (IDDSI Level 7) [facility]; Thin

## 2019-04-18 NOTE — PROGRESS NOTES
Patient's chart was reviewed by a stroke team provider.    Patient with no change in neuro status. Doing well clinically s/p L ICA embolization w/ coiling.   There is no new imaging to review.    Pending diagnostics to follow up on include: Serial TCDs.  For other recommendations please see our previous note completed on: 4/17/2019.    There are no new recommendations at this time. Will continue to follow. Discussed patient with staff. Please contact stroke team for any questions or concerns.     Pat Billy NP  [facility]  Department of Vascular Neurology   [facility]  [phone]

## 2019-04-18 NOTE — PROGRESS NOTES
[facility]  Neurocritical Care  Progress Note    Admit Date: 2019  Service Date: 2019  Length of Stay: 1    Subjective:     Chief Complaint: SAH (subarachnoid hemorrhage)    History of Present Illness: The patient is a 63-year-old female with PMH of HTN, HLD, Thyroid Disease, that presents as a transfer from [facility] ([facility]) for management of intracranial aneurysm.  Patient presented to [facility] ED with complaints of sudden onset severe headache with neck stiffness that began at 8am today. She acknowledges, headache, visual disturbances, nausea without vomiting, but denies numbness and tingling.  CT head and CTA brain were obtained. 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  There was no evidence of acute intracranial hemorrhage.   She  endorses having taken IBU but with no relief. Denies anticoagulant use. Of note,  patient's father  or intracerebral aneurysm at an early age.        Hospital Course:  Admit to NCC, Keppra, Alisonitop, CTH, MRI   s/p Angio, ECHO, Permissive HTN    Interval History:  Angio this AM. S/P ICA Aneurysm coiling. Permissive HTN. ECHO stable. Evaluate for feeding. May start Sub Q heparin in AM  Review of Systems   Constitutional: Positive for activity change.   Eyes: Negative for visual disturbance.   Respiratory: Negative for shortness of breath.    Cardiovascular: Negative for chest pain.   Gastrointestinal: Negative for abdominal pain and vomiting.   Musculoskeletal: Negative for neck pain and neck stiffness.   Neurological: Positive for weakness and headaches.       Objective:     Vitals:  Temp: 98.3 °F (36.8 °C)  Pulse: 99  Rhythm: normal sinus rhythm  BP: 129/60  MAP (mmHg): 87  Resp: 18  SpO2: 97 %  O2 Device (Oxygen Therapy): nasal cannula    Temp  Min: 98.3 °F (36.8 °C)  Max: 99 °F (37.2 °C)  Pulse  Min: 70  Max: 104  BP  Min: 124/58  Max: 147/67  MAP (mmHg)  Min: 83  Max: 97  Resp  Min: 12  Max: 20  SpO2   Min: 93 %  Max: 100 %    04/16 0701 - 04/17 0700  In: 1255.2 [I.V.:1155.2]  Out: 650 [Urine:650]   Unmeasured Output  Urine Occurrence: 1  Pad Count: 2       Physical Exam   Constitutional: She appears well-developed.   HENT:   Head: Normocephalic and atraumatic.   Eyes: Pupils are equal, round, and reactive to light. EOM are normal.   Neck: Normal range of motion.   Cardiovascular: Normal rate.   Pulmonary/Chest: Effort normal.   Abdominal: Soft. Bowel sounds are normal.   Musculoskeletal: Normal range of motion.   Neurological:   Mental Status:  Awake, alert follows commands     Negative Aphasia Dysarthria Ataxia Apraxia   Pupils 3mm, PERRL.   +Corneal reflex, +gag, +cough   MACK Spontaneous  Equal Strength throughout          Unable to test coordination, gait due to level of consciousness.    Medications:  Continuous  sodium chloride 0.9% Last Rate: 75 mL/hr at 04/17/19 0501   niCARdipine Last Rate: Stopped (04/17/19 0815)   Scheduled  levetiracetam IVPB 500 mg Q12H   niMODipine 60 mg Q4H   scopolamine 1 patch Once   senna-docusate 8.6-50 mg 1 tablet BID   PRN  acetaminophen 650 mg Q6H PRN   hydrALAZINE 10 mg Q4H PRN   magnesium oxide 800 mg PRN   magnesium oxide 800 mg PRN   ondansetron 4 mg Q6H PRN   oxyCODONE 5 mg Q6H PRN   potassium chloride 10% 40 mEq PRN   potassium chloride 10% 40 mEq PRN   potassium chloride 10% 60 mEq PRN   potassium, sodium phosphates 2 packet PRN   potassium, sodium phosphates 2 packet PRN   potassium, sodium phosphates 2 packet PRN   sodium chloride 0.9% 10 mL PRN   sodium chloride 0.9% 10 mL PRN     Today I personally reviewed pertinent medications, imaging, laboratory results, notably:    Diet  Diet NPO  Diet NPO          Assessment/Plan:     Neuro  * SAH (subarachnoid hemorrhage)  - Neurological checks q1h  - Seizure, fall, aspiration precautions  - Head of bed at 30 degrees at all times  Permissive HTN Post Secure Coiling  - MRI  - TCD  daily  - Meds:  Nimodipine 60mg PO q4h  APAP  650 mg PO q6h PRN for pain or headache  Levetiracetam 5000mg bid (for seizure prophylaxis)  ___________________  Respiratory:  - Aspiration precautions, head of bed above 30 degrees  - PRN O2  ______________________________________________  Cardiology:  - Continuous cardiac telemetry  Permissive HTN  - TTE Stable, EF 60%  _______________________________________________  Renal:  - Renal function normal  - Monitor daily CMP, Mg, Phos  - Meds:  NS @ 75ml/h  _______________________________________________  Gastrointestinal:  - NPO for now till speech evaluation  - Meds:  1. Docusate 100 mg PO TID  ________________________________________________  Hematology:  - Monitor CBC daily  May start Sub Q heparin in AM  ______________________________________________  Infectious Disease:  - Keep normothermic  - Meds:  ?      1. APAP 650mg q6hr PRN for fever   _______________________________________________  Prophylaxis:  DVT: SCDs  GI: (hold for now)  ________________________________________________          Vasogenic cerebral edema  As seen on Imaging  Q1 hr Neuro Monitoring  Stat CT for changes in LOC      Endocrine  Thyroid disease  History on Admit  TSH  Free T4    Other  S/P coil embolization of cerebral aneurysm  S/P Coil of ICA Aneurysm POD 0  Permissive HTN   Continue Nimitop and Daily TCDs  ECHO complete/ EF 60%  Continue IVF  Swallow Eval to assess for Oral Feeding            The patient is being Prophylaxed for:  Venous Thromboembolism with: Mechanical  Stress Ulcer with: None  Ventilator Pneumonia with: none    Activity Orders          Diet NPO: NPO starting at 04/16 1810        Full Code    Alexandro De La Cruz NP  Neurocritical Care  [facility]

## 2019-04-18 NOTE — PT/OT/SLP EVAL
"Speech Language Pathology Evaluation/Discharge  Cognitive Communication    Patient Name:  Adriana Fonseca   MRN:  [id number]  Admitting Diagnosis: SAH (subarachnoid hemorrhage)    Recommendations:     Recommendations:                General Recommendations:  Follow-up not indicated  Diet recommendations:  Regular, Thin   Aspiration Precautions: HOB to 90 degrees, Monitor for s/s of aspiration and Standard aspiration precautions   General Precautions: Standard, aspiration, fall  Communication strategies:  none    History:     Past Medical History:   Diagnosis Date    Hyperlipemia     Hypertension     Thyroid disease        Past Surgical History:   Procedure Laterality Date    ANGIOGRAM-CEREBRAL Bilateral 4/17/2019    Performed by [facility]    HYSTERECTOMY         Social History: Patient lives with . Pt currently working as an  at a [employer].  She drives, manages medications and finances, and was independent premorbidly.     Subjective     "She's getting back to her old self." pt and family members denied residual cognitive deficits.     Pain/Comfort:  · Pain Rating 1: (no c/o or indications of pain )    Objective:   Cognitive Status:    O x 4. Recall of general information, immediate memory, and delayed recall were all intact (100% accuracy). Pt completed problem solving, reasoning, and sequencing tasks with 100% acc.     Receptive Language:   Comprehension:   WNL. Pt answered complex yes/no q's and followed a 3-step command with 100% acc.     Pragmatics:    WNL    Expressive Language:  WNL. Pt able to express basic and complex information. During a word fluency task, she listed 17 items in a category in one minute (15-20 is wnl).       Motor Speech:  WFL    Voice:   WFL    Visual-Spatial:  WFL    Reading:   WFL     Written Expression:   unable to assess due to presence of IV line and stabilizing splint on right/dominant hand.    Treatment: Education provided to " pt/family regarding purpose of speech/language/cognitive evaluation, results of assessment, and no further skilled SLP services recommended at this time. Pt/family encouraged to report any noted changes in cognition.  Understanding and agreement were expressed.     Assessment:   Adriana Fonseca is a 63 y.o. female .  Speech, language, and cognition found to be WFL/WNL. No further skilled SLP services warranted at this time.     Goals:   Multidisciplinary Problems     SLP Goals     Not on file          Multidisciplinary Problems (Resolved)        Problem: SLP Goal    Goal Priority Disciplines Outcome   SLP Goal   (Resolved)     SLP Outcome(s) achieved   Description:  Goals due 4/24  1.  Pt. Will participate in speech language cognitive eval. Goal met 4/18. No follow up warranted.                    Plan:   · Plan of Care reviewed with:  patient, spouse, family   · SLP Follow-Up:  No       Discharge recommendations:  Discharge Facility/Level of Care Needs: home(no further skilled SLP services warranted at this time)     Time Tracking:   SLP Treatment Date:   04/18/19  Speech Start Time:  1059  Speech Stop Time:  1115     Speech Total Time (min):  16 min    Billable Minutes: Eval 16     EDIN Chan, CCC-SLP  04/18/2019     EDIN Chan, CCC-SLP  Speech Language Pathologist  [phone]  4/18/2019

## 2019-04-18 NOTE — PLAN OF CARE
Problem: Occupational Therapy Goal  Goal: Occupational Therapy Goal  Goals to be met by: 4/25     Patient will increase functional independence with ADLs by performing:    UB/LB Dressing with set up and mod(I).  Grooming while standing at sink with Set-up Assistance and Supervision.  Toilet transfer to toilet with Supervision.  Toileting care and clothing mgmt for task with Supervision.   Functional mobility at household and short community distance for ADL task with Supervision.  Pt will verbalize s/s of stroke with teachback understanding    Outcome: Ongoing (interventions implemented as appropriate)  OT eval completed. Will follow up 2x/w in acute setting. Likely no OT needs for d/c planning. CLOVIS Castillo 4/18/2019

## 2019-04-18 NOTE — ASSESSMENT & PLAN NOTE
S/P Coil of ICA Aneurysm POD 01  Permissive HTN < 200  Continue Nimitop and Daily TCDs  ECHO complete/ EF 60%  Continue IVF  Start Diet

## 2019-04-18 NOTE — HOSPITAL COURSE
4/18: Taken to cath yday for DSA with coil embolization of L hypophyseal aneurysm. NAEON. Intact on exam. Reports some neck pain and HA. Groin soft. TCD pending for today.    4/20-4/22: NAEON. No issues per nursing. HA improving. TCD without sonographic evidence for vasospasm. Intact on exam.   4/25-4/26: Intact on exam and no issues. TCD without sonographic evidence for vasospasm

## 2019-04-18 NOTE — PT/OT/SLP EVAL
"Physical Therapy Evaluation    Patient Name:  Adriana Fonseca   MRN:  [id number]    Recommendations:     Discharge Recommendations:  home   Discharge Equipment Recommendations: none   Barriers to discharge: None    Assessment:     Adriana Fonseca is a 63 y.o. female admitted with a medical diagnosis of SAH (subarachnoid hemorrhage).  She presents with the following impairments/functional limitations:  impaired endurance, impaired functional mobilty, impaired self care skills, pain, weakness, impaired muscle length. At this time the patient's activity tolerance is primarily limited by head and neck pain that increases with standing and gait. She has new onset tightness in L hamstring and calf resulting in decreased strength testing of L quad.  Anticipate discharge home with no PT needs when medically cleared. She is safe to ambulate with RN assist.    Rehab Prognosis: Good; patient would benefit from acute skilled PT services to address these deficits and reach maximum level of function.    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  Equipment used at home: none.  DME owned (not currently used): none.  Upon discharge, patient will have assistance from .    Objective:     Communicated with RN prior to session.  Patient found supine with arterial line, blood pressure cuff, peripheral IV, pulse ox (continuous), telemetry, SCD  upon PT entry to room.    General Precautions: Standard, aspiration, fall   Orthopedic Precautions:N/A   Braces: N/A     Exams:    Cognitive Exam  Patient is A&O x4 and follows 100% of one -step commands    Fine Motor Coordination   -       Intact heel to shin bilaterally     Postural Exam Patient presented with the following abnormalities:    -       Rounded shoulders  -       Forward head   Sensation    -       Light touch intact bilateral lower extremities   Skin Integrity/Edema     -       Skin integrity: intact  -       Edema: NA   R LE ROM WFL   R LE Strength 5/5 hip flexion, knee ext/flex, and ankle DF/PF   L LE ROM WFL, tightness L calf and hamstring   L LE Strength  5/5 hip flexion, 3+/5 knee ext 5/5 knee flex, and ankle DF/PF      Balance          Static Sitting supervision assistance   Dynamic Sitting supervision assistance   Static Standing stand by assistance   Dynamic Standing contact guard assist   Narrow REGINO Eyes open: WFL  NBOS EC: Increased sway, WFL  Tandem step L: 15 sec, able to take small step forward  Tandem step R: 10 sec, able to take small step forward               Functional Mobility:    Bed Mobility  Supine to Sit:  supervision assistance   Transfers Sit to Stand:  stand by assistance   Gait  Gait Distance: 15 ft with no AD  Assistance Level: stand by assistance  Description: decreased speed, flat footed gait, increased time in double limb stance, limited by pain in head and neck           Therapeutic Activities and Exercises:   Patient  educated on role of therapy,  "goals of session, benefits of out of bed mobility. Patient agreeable to mobilize with therapy.  Discussed PT plan of care during hospitalization. Patient educated that they need to call for assistance to mobilize out of bed. Whiteboard updated as appropriate. Patient educated on how their diagnosis impacts their mobility within PT scope of practice. Patient encouraged to ambulate with RN staff over weekend, encouraged to sit up in chair with assist. Discussed chair exercises to perform: marching, LAQ, ankle pumps. Hamstring/calf stretching 30" x2 L side.       AM-PAC 6 CLICK MOBILITY  Total Score:22     Patient left up in chair with all lines intact, call button in reach and RN notified.    GOALS:   Multidisciplinary Problems     Physical Therapy Goals        Problem: Physical Therapy Goal    Goal Priority Disciplines Outcome Goal Variances Interventions   Physical Therapy Goal     PT, PT/OT Ongoing (interventions implemented as appropriate)     Description:  Goals to be met by: 2019     Patient will increase functional independence with mobility by performin. Supine to sit with [place]  2. Sit to supine with [place]  3. Sit to stand transfer with [place]  4. Gait  x 150 feet with Supervision using no AD  5. Stand for 5 minutes while performing dynamic balance exercises without AD, without loss of balance to decrease risk of falls.  6. Lower extremity exercise program x20 reps per handout, with independence                      History:     Past Medical History:   Diagnosis Date    Hyperlipemia     Hypertension     Thyroid disease        Past Surgical History:   Procedure Laterality Date    ANGIOGRAM-CEREBRAL Bilateral 2019    Performed by [facility] at [facility]    HYSTERECTOMY         Time Tracking:     PT Received On: 19  PT Start Time: 930     PT Stop Time: 55  PT Total Time (min): 25 min     Billable Minutes: Evaluation 25      Mita Bahena, PT  2019  "

## 2019-04-18 NOTE — PROGRESS NOTES
[facility]  Neurosurgery  Progress Note    Subjective:     History of Present Illness: 63 year old female with PMHx HTN, hypothyroidism presents as a transfer from [facility] for evaluation of left ICA saccular aneurysm. Patient's family at bedside providing majority of history. Patient reports severe posterior headache, neck pain, photophobia starting at 930am. Subsequently brought to the ED by her family. Denies weakness, numbness, paresthesias, seizure activity, LOC, falls/trauma. Family at bedside state patient's father  in his 30s of intracerebral aneurysmal rupture. Transferred to [facility] for higher level of care. Denies use of anticoagulants or antiplatelet therapy. Reports compliance with BP medication however family states SBP is usually ~150s at home. Review of coags at OSH all within normal limits: aPTT 28, INR 0.9, PT 12.7.    Post-Op Info:  Procedure(s) (LRB):  ANGIOGRAM-CEREBRAL (Bilateral)   1 Day Post-Op     Interval History: Taken to cath yday for DSA with coil embolization of L hypophyseal aneurysm. NAEON. Intact on exam. Reports some neck pain and HA. Groin soft. TCD pending for today.      Medications:  Continuous Infusions:   sodium chloride 0.9% 75 mL/hr at 19 1301     Scheduled Meds:   heparin (porcine)  5,000 Units Subcutaneous Q8H    levETIRAcetam  500 mg Oral BID    niMODipine  60 mg Oral Q4H    scopolamine  1 patch Transdermal Once    senna-docusate 8.6-50 mg  1 tablet Oral BID     PRN Meds:acetaminophen, hydrALAZINE, magnesium oxide, magnesium oxide, methylPREDNISolone sodium succinate, ondansetron, oxyCODONE, potassium chloride 10%, potassium chloride 10%, potassium chloride 10%, potassium, sodium phosphates, potassium, sodium phosphates, potassium, sodium phosphates, sodium chloride 0.9%, sodium chloride 0.9%     Review of Systems   HA and neck pain ow negative     Objective:     Weight: 77.1 kg (169 lb 15.6 oz)  Body mass index is 26.62  kg/m².  Vital Signs (Most Recent):  Temp: 99.1 °F (37.3 °C) (04/18/19 1101)  Pulse: 84 (04/18/19 1301)  Resp: (!) 24 (04/18/19 1301)  BP: 134/66 (04/18/19 1301)  SpO2: 99 % (04/18/19 1301) Vital Signs (24h Range):  Temp:  [98.6 °F (37 °C)-99.5 °F (37.5 °C)] 99.1 °F (37.3 °C)  Pulse:  [74-89] 84  Resp:  [12-25] 24  SpO2:  [93 %-100 %] 99 %  BP: (117-141)/(56-66) 134/66  Arterial Line BP: ()/(44-66) 153/60     Date 04/18/19 0700 - 04/19/19 0659   Shift 0780-1964 9867-6932 4819-8326 24 Hour Total   INTAKE   P.O. 250   250   I.V.(mL/kg) 526.3(6.8)   526.3(6.8)   IV Piggyback 100   100   Shift Total(mL/kg) 876.3(11.4)   876.3(11.4)   OUTPUT   Urine(mL/kg/hr) 300   300   Shift Total(mL/kg) 300(3.9)   300(3.9)   Weight (kg) 77.1 77.1 77.1 77.1                        Neurosurgery Physical Exam  [id number]  Face symm  PERRLA  TM, CN 2-12 grossly normal  MACK, FCX4  Groin soft       Significant Labs:  Recent Labs   Lab 04/16/19 1817 04/17/19  0135 04/18/19  0205    143* 113*    141 140   K 3.8 3.7 3.8    110 111*   CO2 24 20* 21*   BUN 13 13 11   CREATININE 0.8 0.9 0.7   CALCIUM 9.4 8.9 8.2*   MG 2.0 2.1 2.0     Recent Labs   Lab 04/16/19 1817 04/17/19  0135 04/18/19  0205   WBC 6.44 10.71 9.83   HGB 13.8 13.3 12.2   HCT 40.6 39.3 36.2*    296 233     Recent Labs   Lab 04/16/19  1817   INR 1.0   APTT 25.0     Microbiology Results (last 7 days)     ** No results found for the last 168 hours. **        Recent Lab Results       04/18/19  0758   04/18/19  0205   04/17/19  2054   04/17/19  1546        Albumin   3.2         Alkaline Phosphatase   65         ALT   19         Anion Gap   8         Ascending aorta       3.19     Ao peak rajiv       1.63     Ao VTI       33.36     AST   20         AV valve area       1.87     AV mean gradient       6.21     AV peak gradient       10.63     AV Velocity Ratio       0.71     Baso #   0.03         Basophil%   0.3         BILIRUBIN TOTAL   0.7  Comment:  For  infants and newborns, interpretation of results should be based  on gestational age, weight and in agreement with clinical  observations.  Premature Infant recommended reference ranges:  Up to 24 hours.............<8.0 mg/dL  Up to 48 hours............<12.0 mg/dL  3-5 days..................<15.0 mg/dL  6-29 days.................<15.0 mg/dL           BSA       1.9     BUN, Bld   11         Calcium   8.2         Chloride   111         CO2   21         Creatinine   0.7         Left Ventricle Relative Wall Thickness       0.48     Differential Method   Automated         AV index (prosthetic)       0.66     E/A ratio       1.26     E/E' ratio       8.73     eGFR if    >60.0         eGFR if non    >60.0  Comment:  Calculation used to obtain the estimated glomerular filtration  rate (eGFR) is the CKD-EPI equation.            Eos #   0.0         Eosinophil%   0.4         E wave decelartion time       155.60     FS       32     Glucose   113         Gran # (ANC)   7.7         Gran%   78.1         Hematocrit   36.2         Hemoglobin   12.2         Immature Grans (Abs)   0.03  Comment:  Mild elevation in immature granulocytes is non specific and   can be seen in a variety of conditions including stress response,   acute inflammation, trauma and pregnancy. Correlation with other   laboratory and clinical findings is essential.           Immature Granulocytes   0.3         IVS       1.23     LA WIDTH       3.90     Left Atrium Major Axis       5.00     Left Atrium Minor Axis       5.00     LA size       3.58     LA volume       59.34     LA Volume Index       31.5     LVOT area       2.83     LV LATERAL E/E' RATIO       8.73     LV SEPTAL E/E' RATIO       8.73     LV Diastolic Volume       79.99     LV Diastolic Volume Index       42.49     LVIDD       4.23     LVIDS       2.89     LV mass       162.97     LV Mass Index       86.6     LVOT diameter       1.90     LVOT peak rajiv        [id number]     LVOT stroke volume       62.40     LVOT peak VTI       22.02     LV Systolic Volume       32.01     LV Systolic Volume Index       17.0     Lymph #   1.3         Lymph%   13.4         Magnesium   2.0         MCH   30.1         MCHC   33.7         MCV   89         Mean e'       0.11     Mono #   0.7         Mono%   7.5         MPV   9.5         MV Peak A Dileep       0.76     MV Peak E Dileep       0.96     nRBC   0         Phosphorus   2.2         Platelets   233         POCT Glucose 113   102       Potassium   3.8         PROTEIN TOTAL   6.1         PW       1.01     Right Atrial Pressure (from IVC)       3     RA Major Axis       5.21     RA Width       3.28     RBC   4.05         RDW   13.2         RVDD       3.55     Sinus       2.60     Sodium   140         STJ       2.42     TAPSE       2.73     TDI SEPTAL       0.11     TDI LATERAL       0.11     Triscuspid Valve Regurgitation Peak Gradient       37.45     TR Max Dileep       3.06     TV rest pulmonary artery pressure       40     WBC   9.83               Significant Diagnostics:  CT: No results found in the last 24 hours.    Assessment/Plan:     * SAH (subarachnoid hemorrhage)  A  63 year old female with PMHx HTN, hypothyroidism presents as a transfer with HH2F4 SAH due to left hypophyseal saccular aneurysm, now s/p coil embolization (4/17).    Neuro:  --Patient admitted to Neuro-ICU      -Q1h neurochecks while in ICU  --HOB@30  --Keppra 500 BID x7d  --TCDs daily x14d  --Nimotop 60q4 x 21d  --Continue to follow clinically and notify NSGY immediately if any neurostatus change    CVS:  --SBP <200 mmHg (cardene ggt; hydralazine & labetalol PRN; transition to home meds when appropriate per NCC)    Pulm:  --At RA, encourage IS  --Aggressive pulm treatment per NCC    GIT/Electrolytes:  --CMP daily      -Replete electrolytes PRN per NCC  --Na goal >135    Renal:  --Strict I/Os  --Goal of euvolemia or net +1L    Heme/ID:  --Daily CBC      -Transfuse  PRN  --Trend WBC and T    PPx:  --TEDs/SCDs  --SQH   --PPI    Dispo: Continued ICU care at this time for vasospasm watch         Uzma Celaya MD  Neurosurgery  [facility]

## 2019-04-18 NOTE — PROGRESS NOTES
[facility]  Neurocritical Care  Progress Note    Admit Date: 2019  Service Date: 2019  Length of Stay: 2    Subjective:     Chief Complaint: SAH (subarachnoid hemorrhage)    History of Present Illness: The patient is a 63-year-old female with PMH of HTN, HLD, Thyroid Disease, that presents as a transfer from [facility] for management of intracranial aneurysm.  Patient presented to [facility] ED with complaints of sudden onset severe headache with neck stiffness that began at 8am today. She acknowledges, headache, visual disturbances, nausea without vomiting, but denies numbness and tingling.  CT head and CTA brain were obtained. 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  There was no evidence of acute intracranial hemorrhage.   She  endorses having taken IBU but with no relief. Denies anticoagulant use. Of note,  patient's father  or intracerebral aneurysm at an early age.        Hospital Course:  Admit to NCC, Keppra, Nimitop, CTH, MRI   s/p Angio, ECHO, Permissive HTN   Solumedrol for HA, Start heparin, Start Diet    Interval History:  Stable overnight. More alert today. Continues to c/o HA. Tylenol ordered. One time dose of Solumedrol ordered if Tylenol ineffective. Start heparin SQ today. Continue Q1 hr neuro monitoring, SBP< 200, Nimitop and Daily TCD    Review of Systems   Constitutional: Positive for activity change.   Eyes: Negative for photophobia and visual disturbance.   Respiratory: Negative for chest tightness and shortness of breath.    Cardiovascular: Negative for chest pain.   Gastrointestinal: Negative for abdominal pain and nausea.   Genitourinary: Negative for flank pain.   Musculoskeletal: Positive for neck pain.   Neurological: Positive for weakness.   Psychiatric/Behavioral: Negative for agitation.       Objective:     Vitals:  Temp: 99.1 °F (37.3 °C)  Pulse: 77  Rhythm: normal sinus rhythm  BP: 127/61  MAP  (mmHg): 87  Resp: (!) 25  SpO2: 97 %  O2 Device (Oxygen Therapy): room air    Temp  Min: 98.6 °F (37 °C)  Max: 99.5 °F (37.5 °C)  Pulse  Min: 76  Max: 89  BP  Min: 117/56  Max: 141/66  MAP (mmHg)  Min: 81  Max: 95  Resp  Min: 11  Max: 25  SpO2  Min: 93 %  Max: 100 %    04/17 0701 - 04/18 0700  In: 2473.8 [P.O.:100; I.V.:2173.8]  Out: 1200 [Urine:1200]   Unmeasured Output  Urine Occurrence: 1  Pad Count: 1       Physical Exam   Constitutional: She appears well-developed and well-nourished.   HENT:   Head: Normocephalic.   Eyes: Pupils are equal, round, and reactive to light. EOM are normal.   Neck: Normal range of motion.   Cardiovascular: Normal rate and regular rhythm.   Pulmonary/Chest: Effort normal. No respiratory distress.   Abdominal: Soft. She exhibits no distension.   Musculoskeletal: Normal range of motion.   Neurological:   Mental Status:  Awake, alert, follows commands     Negative Aphasia Dysarthria Ataxia Apraxia   Pupils 3 mm, PERRL.   +Corneal reflex, +gag, +cough   MACK Spontaneous  Minimal R side weakness         Unable to test coordination, gait due to level of consciousness.    Medications:  Continuous  sodium chloride 0.9% Last Rate: 75 mL/hr at 04/18/19 1101   Scheduled  heparin (porcine) 5,000 Units Q8H   levETIRAcetam 500 mg BID   niMODipine 60 mg Q4H   scopolamine 1 patch Once   senna-docusate 8.6-50 mg 1 tablet BID   PRN  acetaminophen 650 mg Q6H PRN   hydrALAZINE 10 mg Q4H PRN   magnesium oxide 800 mg PRN   magnesium oxide 800 mg PRN   methylPREDNISolone sodium succinate 125 mg Once PRN   ondansetron 4 mg Q6H PRN   oxyCODONE 5 mg Q6H PRN   potassium chloride 10% 40 mEq PRN   potassium chloride 10% 40 mEq PRN   potassium chloride 10% 60 mEq PRN   potassium, sodium phosphates 2 packet PRN   potassium, sodium phosphates 2 packet PRN   potassium, sodium phosphates 2 packet PRN   sodium chloride 0.9% 10 mL PRN   sodium chloride 0.9% 10 mL PRN     Today I personally reviewed pertinent medications,  laboratory results, notably:    Diet  Diet Adult Regular (IDDSI Level 7) [facility]; Thin  Diet Adult Regular (IDDSI Level 7) [facility]; Thin        Assessment/Plan:     Neuro  * SAH (subarachnoid hemorrhage)  - Neurological checks q1h  - Seizure, fall, aspiration precautions  - Head of bed at 30 degrees at all times  Permissive HTN Post Secure Coiling  - TCD  daily  - Meds:  Nimodipine 60mg PO q4h  APAP 650 mg PO q6h PRN for pain or headache  Levetiracetam 5000mg bid (for seizure prophylaxis)  ___________________  Respiratory:  - Aspiration precautions, head of bed above 30 degrees  - PRN O2  ______________________________________________  Cardiology:  - Continuous cardiac telemetry  Permissive HTN  - TTE Stable, EF 60%  _______________________________________________  Renal:  - Renal function normal  - Monitor daily CMP, Mg, Phos  NS @ 75ml/h  _______________________________________________  Gastrointestinal:  - Meds:  1. Docusate 100 mg PO TID  ________________________________________________  Hematology:  - Monitor CBC daily   Sub Q heparin  ______________________________________________  Infectious Disease:  - Keep normothermic  - Meds:  ?      1. APAP 650mg q6hr PRN for fever   _______________________________________________  Prophylaxis:  DVT: SQ Heparin  GI: (hold for now)  ________________________________________________          Vasogenic cerebral edema  As seen on Imaging  Q1 hr Neuro Monitoring  Stat CT for changes in LOC      Headache  Tylenol PRN  Oxycodone PRN  Solumedrol if Tylenol/Oxycodone ineffective    Cardiac/Vascular  Essential hypertension  Cardiac Monitoring  VS Q1 Hour  Permissive HTN post coil    Endocrine  Thyroid disease  History on Admit  TSH  Free T4    Other  S/P coil embolization of cerebral aneurysm  S/P Coil of ICA Aneurysm POD 01  Permissive HTN < 200  Continue Nimitop and Daily TCDs  ECHO complete/ EF 60%  Continue IVF  Start Diet            The patient is being  Prophylaxed for:  Venous Thromboembolism with: Chemical  Stress Ulcer with: None  Ventilator Pneumonia with: none    Activity Orders          Diet Adult Regular (IDDSI Level 7) [facility]; Thin: Regular starting at 04/18 0707        Full Code    Alexandro De La Cruz NP  Neurocritical Care  [facility]

## 2019-04-18 NOTE — PLAN OF CARE
Problem: SLP Goal  Goal: SLP Goal  Goals due 4/24  1.  Pt. Will participate in speech language cognitive eval. Goal met 4/18. No follow up warranted.  Outcome: Outcome(s) achieved Date Met: 04/18/19  Speech/language/cognitive evaluation completed.  All of these areas assessed appear intact. No further skilled SLP services warranted at this time.   EDIN Chan, CCC-SLP  Speech Language Pathologist  [phone]  4/18/2019

## 2019-04-18 NOTE — PLAN OF CARE
Problem: Adult Inpatient Plan of Care  Goal: Plan of Care Review  Outcome: Ongoing (interventions implemented as appropriate)  POC reviewed with pt and family at 1200. Pt verbalized understanding. Questions and concerns addressed. No acute events today. Pt progressing toward  goals. Patient started on Regular diet. Patient was able to tolerate PT session and was able to sit up in chair with 1 assist. Patient is still on Normal Saline at 75ml/hour. Will continue to monitor. See flowsheets for full assessment and VS info.

## 2019-04-18 NOTE — PLAN OF CARE
Problem: Adult Inpatient Plan of Care  Goal: Plan of Care Review  Outcome: Ongoing (interventions implemented as appropriate)  POC reviewed with pt at 0500. Pt verbalized understanding. Questions and concerns addressed. No acute events overnight. Pt progressing toward goals. Will continue to monitor. See flowsheets for full assessment and VS info

## 2019-04-18 NOTE — ASSESSMENT & PLAN NOTE
S/P Coil of ICA Aneurysm POD 0  Permissive HTN   Continue Nimitop and Daily TCDs  ECHO complete/ EF 60%  Continue IVF  Swallow Eval to assess for Oral Feeding

## 2019-04-18 NOTE — PT/OT/SLP EVAL
Occupational Therapy   Evaluation    Name: Adriana Fonseca  MRN: [id number]  Admitting Diagnosis:  SAH (subarachnoid hemorrhage) 1 Day Post-Op angiogram for coiling of ICA aneurysm     Recommendations:     Discharge Recommendations: home  Discharge Equipment Recommendations:  none  Barriers to discharge:  None    Assessment:     Adriana Fonseca is a 63 y.o. female with a medical diagnosis of SAH (subarachnoid hemorrhage).  She presents with limitations 2/2 pain and decline in endurance for activity on this date. She demo good motivation and participation this session. OT to follow up 2x/w in acute setting to address stated goals listed below- Pt would greatly benefit from mobility/being up for ADLs with nursing staff for overall endurance during ICU stay. Performance deficits affecting function: impaired endurance, impaired self care skills, impaired functional mobilty, pain.      Rehab Prognosis: Good; patient would benefit from acute skilled OT services to address these deficits and reach maximum level of function.       Plan:     Patient to be seen 2 x/week to address the above listed problems via self-care/home management, therapeutic activities, neuromuscular re-education  · Plan of Care Expires: 05/16/19  · Plan of Care Reviewed with: patient    Subjective     Chief Complaint: neck pain  Patient/Family Comments/goals: to wear real clothing    Occupational Profile:  Living Environment: Pt lives with spouse in [facility] with threshold to enter. Tub/shower combo.  Previous level of function: active and indep. Driving. Working as teacher. Wears glasses. Requiring no assist/DME for ADLs/self care/mobility.   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 Completed with PT in ICU setting.  Patient found HOB elevated with arterial line, blood pressure cuff, peripheral IV, SCD, telemetry upon OT entry to room.    General Precautions: Standard, aspiration, fall   Orthopedic Precautions:N/A   Braces: N/A     Occupational Performance:  Functional Mobility/Transfers:  · Patient completed Sit <> Stand Transfer with supervision  with  no assistive device   · Patient completed Bed <> Chair Transfer using Step Transfer technique with contact guard assistance with hand-held assist  · Functional Mobility: short distance within room with SBA; unilateral hand held assistance     Activities of Daily Living:  · Grooming: unable to complete in standing 2/2 pain/discomfort (neck); completed with setup while seated in chair    · Upper Body Dressing: modified independence seated EOB to don gown as jacket; tie closure completed  · Lower Body Dressing: minimum assistance to don pull up brief while seated EOB; completed doning B socks EOB with CGA    Cognitive/Visual Perceptual:  Cognitive/Psychosocial Skills:     -       Oriented to: Person, Place, Time and Situation   -       Follows Commands/attention:Follows multistep  commands  -       Communication: clear/fluent  -       Memory: No Deficits noted  -       Safety awareness/insight to disability: intact   -       Mood/Affect/Coping skills/emotional control: Cooperative  Visual/Perceptual:      -Intact  tracking / scanning; no light sensitivity reported    Physical Exam:  Postural examination/scapula alignment:    -       Rounded shoulders  Motor Planning:    -       Intact B UE  Dominant hand:    -       R  Upper Extremity Range of Motion:     -       Right Upper Extremity: WFL  -       Left Upper Extremity: WFL  Upper Extremity Strength:    -       Right  Upper Extremity: WFL  -       Left Upper Extremity: WFL   Strength:    -       Right Upper Extremity: WFL  -       Left Upper Extremity: WFL  Fine Motor Coordination:    -       Intact  Left hand, finger to nose, Right hand, finger to nose, Left hand thumb/finger opposition skills, Right hand thumb/finger opposition skills, Left hand, manipulation of objects and Right hand, manipulation of objects  Gross motor coordination:   WFL B UE    AMPAC 6 Click ADL:  AMPAC Total Score: 20   Body mass index is 26.62 kg/m².  Vitals:    04/18/19 1101   BP: 127/61   Pulse: 77   Resp: (!) 25   Temp: 99.1 °F (37.3 °C)       Treatment & Education:  -Pt alert and agreeable to therapy eval; edu on OT role in care  -edu on need for call light/staff assistance when OOB-- encouraged to be up to chair for meals; assist of staff to bathroom for toileting  -discussed family bringing in LB clothing (to remain in gown 2/2 lines for UB clothing) for best comfort pending 14 day ICU stay  -Communication board updated; questions/concerns addressed within OT scope of practice  -no family at bedside for education   -discussed mobility/ADLs with bedside nurse following session    Education:    Patient left up in chair with all lines intact, call button in reach and RN notified    GOALS:   Multidisciplinary Problems     Occupational Therapy Goals        Problem: Occupational Therapy Goal    Goal Priority Disciplines Outcome Interventions   Occupational Therapy Goal     OT, PT/OT Ongoing (interventions implemented as appropriate)    Description:  Goals to be met by: 4/25     Patient will increase functional independence with ADLs by performing:    UB/LB Dressing with set up and mod(I).  Grooming while standing at sink with Set-up Assistance and Supervision.  Toilet transfer to toilet with Supervision.  Toileting care and clothing mgmt for task with Supervision.   Functional mobility at household and short community distance for ADL task with  Supervision.  Pt will verbalize s/s of stroke with teachback understanding                      History:     Past Medical History:   Diagnosis Date    Hyperlipemia     Hypertension     Thyroid disease        Past Surgical History:   Procedure Laterality Date    ANGIOGRAM-CEREBRAL Bilateral 4/17/2019    Performed by Lamar Surgeon at [facility]         Time Tracking:     OT Date of Treatment: 04/18/19  OT Start Time: 0929  OT Stop Time: 0952  OT Total Time (min): 23 min    Billable Minutes:Evaluation 23    CLOVIS Castillo  4/18/2019

## 2019-04-18 NOTE — SUBJECTIVE & OBJECTIVE
Interval History: Taken to cath yday for DSA with coil embolization of L hypophyseal aneurysm. NAEON. Intact on exam. Reports some neck pain and HA. Groin soft. TCD pending for today.      Medications:  Continuous Infusions:   sodium chloride 0.9% 75 mL/hr at 04/18/19 1301     Scheduled Meds:   heparin (porcine)  5,000 Units Subcutaneous Q8H    levETIRAcetam  500 mg Oral BID    niMODipine  60 mg Oral Q4H    scopolamine  1 patch Transdermal Once    senna-docusate 8.6-50 mg  1 tablet Oral BID     PRN Meds:acetaminophen, hydrALAZINE, magnesium oxide, magnesium oxide, methylPREDNISolone sodium succinate, ondansetron, oxyCODONE, potassium chloride 10%, potassium chloride 10%, potassium chloride 10%, potassium, sodium phosphates, potassium, sodium phosphates, potassium, sodium phosphates, sodium chloride 0.9%, sodium chloride 0.9%     Review of Systems   HA and neck pain ow negative     Objective:     Weight: 77.1 kg (169 lb 15.6 oz)  Body mass index is 26.62 kg/m².  Vital Signs (Most Recent):  Temp: 99.1 °F (37.3 °C) (04/18/19 1101)  Pulse: 84 (04/18/19 1301)  Resp: (!) 24 (04/18/19 1301)  BP: 134/66 (04/18/19 1301)  SpO2: 99 % (04/18/19 1301) Vital Signs (24h Range):  Temp:  [98.6 °F (37 °C)-99.5 °F (37.5 °C)] 99.1 °F (37.3 °C)  Pulse:  [74-89] 84  Resp:  [12-25] 24  SpO2:  [93 %-100 %] 99 %  BP: (117-141)/(56-66) 134/66  Arterial Line BP: ()/(44-66) 153/60     Date 04/18/19 0700 - 04/19/19 0659   Shift 2109-2721 2372-1308 7945-4119 24 Hour Total   INTAKE   P.O. 250   250   I.V.(mL/kg) 526.3(6.8)   526.3(6.8)   IV Piggyback 100   100   Shift Total(mL/kg) 876.3(11.4)   876.3(11.4)   OUTPUT   Urine(mL/kg/hr) 300   300   Shift Total(mL/kg) 300(3.9)   300(3.9)   Weight (kg) 77.1 77.1 77.1 77.1                        Neurosurgery Physical Exam  [id number]  Face symm  PERRLA  TM, CN 2-12 grossly normal  MACK, FCX4  Groin soft       Significant Labs:  Recent Labs   Lab 04/16/19  1817 04/17/19  0135 04/18/19  0205   GLU  108 143* 113*    141 140   K 3.8 3.7 3.8    110 111*   CO2 24 20* 21*   BUN 13 13 11   CREATININE 0.8 0.9 0.7   CALCIUM 9.4 8.9 8.2*   MG 2.0 2.1 2.0     Recent Labs   Lab 04/16/19  1817 04/17/19  0135 04/18/19  0205   WBC 6.44 10.71 9.83   HGB 13.8 13.3 12.2   HCT 40.6 39.3 36.2*    296 233     Recent Labs   Lab 04/16/19 1817   INR 1.0   APTT 25.0     Microbiology Results (last 7 days)     ** No results found for the last 168 hours. **        Recent Lab Results       04/18/19  0758   04/18/19  0205   04/17/19 2054 04/17/19  1546        Albumin   3.2         Alkaline Phosphatase   65         ALT   19         Anion Gap   8         Ascending aorta       3.19     Ao peak rajiv       1.63     Ao VTI       33.36     AST   20         AV valve area       1.87     AV mean gradient       6.21     AV peak gradient       10.63     AV Velocity Ratio       0.71     Baso #   0.03         Basophil%   0.3         BILIRUBIN TOTAL   0.7  Comment:  For infants and newborns, interpretation of results should be based  on gestational age, weight and in agreement with clinical  observations.  Premature Infant recommended reference ranges:  Up to 24 hours.............<8.0 mg/dL  Up to 48 hours............<12.0 mg/dL  3-5 days..................<15.0 mg/dL  6-29 days.................<15.0 mg/dL           BSA       1.9     BUN, Bld   11         Calcium   8.2         Chloride   111         CO2   21         Creatinine   0.7         Left Ventricle Relative Wall Thickness       0.48     Differential Method   Automated         AV index (prosthetic)       0.66     E/A ratio       1.26     E/E' ratio       8.73     eGFR if    >60.0         eGFR if non    >60.0  Comment:  Calculation used to obtain the estimated glomerular filtration  rate (eGFR) is the CKD-EPI equation.            Eos #   0.0         Eosinophil%   0.4         E wave decelartion time       155.60     FS       32     Glucose   113          Gran # (ANC)   7.7         Gran%   78.1         Hematocrit   36.2         Hemoglobin   12.2         Immature Grans (Abs)   0.03  Comment:  Mild elevation in immature granulocytes is non specific and   can be seen in a variety of conditions including stress response,   acute inflammation, trauma and pregnancy. Correlation with other   laboratory and clinical findings is essential.           Immature Granulocytes   0.3         IVS       1.23     LA WIDTH       3.90     Left Atrium Major Axis       5.00     Left Atrium Minor Axis       5.00     LA size       3.58     LA volume       59.34     LA Volume Index       31.5     LVOT area       2.83     LV LATERAL E/E' RATIO       8.73     LV SEPTAL E/E' RATIO       8.73     LV Diastolic Volume       79.99     LV Diastolic Volume Index       42.49     LVIDD       4.23     LVIDS       2.89     LV mass       162.97     LV Mass Index       86.6     LVOT diameter       1.90     LVOT peak dileep       [id number]     LVOT stroke volume       62.40     LVOT peak VTI       22.02     LV Systolic Volume       32.01     LV Systolic Volume Index       17.0     Lymph #   1.3         Lymph%   13.4         Magnesium   2.0         MCH   30.1         MCHC   33.7         MCV   89         Mean e'       0.11     Mono #   0.7         Mono%   7.5         MPV   9.5         MV Peak A Dileep       0.76     MV Peak E Dileep       0.96     nRBC   0         Phosphorus   2.2         Platelets   233         POCT Glucose 113   102       Potassium   3.8         PROTEIN TOTAL   6.1         PW       1.01     Right Atrial Pressure (from IVC)       3     RA Major Axis       5.21     RA Width       3.28     RBC   4.05         RDW   13.2         RVDD       3.55     Sinus       2.60     Sodium   140         STJ       2.42     TAPSE       2.73     TDI SEPTAL       0.11     TDI LATERAL       0.11     Triscuspid Valve Regurgitation Peak Gradient       37.45     TR Max Dileep       3.06     TV rest pulmonary artery  pressure       40     WBC   9.83               Significant Diagnostics:  CT: No results found in the last 24 hours.

## 2019-04-18 NOTE — ASSESSMENT & PLAN NOTE
A  63 year old female with PMHx HTN, hypothyroidism presents as a transfer with HH2F4 SAH due to left hypophyseal saccular aneurysm, now s/p coil embolization (4/17).    Neuro:  --Patient admitted to Neuro-ICU      -Q1h neurochecks while in ICU  --HOB@30  --Keppra 500 BID x7d  --TCDs daily x14d  --Nimotop 60q4 x 21d  --Continue to follow clinically and notify NSGY immediately if any neurostatus change    CVS:  --SBP <200 mmHg (cardene ggt; hydralazine & labetalol PRN; transition to home meds when appropriate per NCC)    Pulm:  --At RA, encourage IS  --Aggressive pulm treatment per NCC    GIT/Electrolytes:  --CMP daily      -Replete electrolytes PRN per NCC  --Na goal >135    Renal:  --Strict I/Os  --Goal of euvolemia or net +1L    Heme/ID:  --Daily CBC      -Transfuse PRN  --Trend WBC and T    PPx:  --TEDs/SCDs  --SQH   --PPI    Dispo: Continued ICU care at this time for vasospasm watch

## 2019-04-18 NOTE — ASSESSMENT & PLAN NOTE
- Neurological checks q1h  - Seizure, fall, aspiration precautions  - Head of bed at 30 degrees at all times  Permissive HTN Post Secure Coiling  - MRI  - TCD  daily  - Meds:  Nimodipine 60mg PO q4h  APAP 650 mg PO q6h PRN for pain or headache  Levetiracetam 5000mg bid (for seizure prophylaxis)  ___________________  Respiratory:  - Aspiration precautions, head of bed above 30 degrees  - PRN O2  ______________________________________________  Cardiology:  - Continuous cardiac telemetry  Permissive HTN  - TTE Stable, EF 60%  _______________________________________________  Renal:  - Renal function normal  - Monitor daily CMP, Mg, Phos  - Meds:  NS @ 75ml/h  _______________________________________________  Gastrointestinal:  - NPO for now till speech evaluation  - Meds:  1. Docusate 100 mg PO TID  ________________________________________________  Hematology:  - Monitor CBC daily  May start Sub Q heparin in AM  ______________________________________________  Infectious Disease:  - Keep normothermic  - Meds:  ?      1. APAP 650mg q6hr PRN for fever   _______________________________________________  Prophylaxis:  DVT: SCDs  GI: (hold for now)  ________________________________________________

## 2019-04-19 PROBLEM — E87.3 COMPENSATED RESPIRATORY ALKALOSIS: Status: ACTIVE | Noted: 2019-04-19

## 2019-04-19 LAB
ALBUMIN SERPL BCP-MCNC: 3.3 G/DL (ref 3.5–5.2)
ALLENS TEST: ABNORMAL
ALP SERPL-CCNC: 63 U/L (ref 55–135)
ALT SERPL W/O P-5'-P-CCNC: 17 U/L (ref 10–44)
ANION GAP SERPL CALC-SCNC: 8 MMOL/L (ref 8–16)
AST SERPL-CCNC: 18 U/L (ref 10–40)
BASOPHILS # BLD AUTO: 0.03 K/UL (ref 0–0.2)
BASOPHILS NFR BLD: 0.4 % (ref 0–1.9)
BILIRUB SERPL-MCNC: 0.5 MG/DL (ref 0.1–1)
BUN SERPL-MCNC: 9 MG/DL (ref 8–23)
CALCIUM SERPL-MCNC: 8.4 MG/DL (ref 8.7–10.5)
CHLORIDE SERPL-SCNC: 110 MMOL/L (ref 95–110)
CO2 SERPL-SCNC: 19 MMOL/L (ref 23–29)
CREAT SERPL-MCNC: 0.7 MG/DL (ref 0.5–1.4)
DELSYS: ABNORMAL
DIFFERENTIAL METHOD: NORMAL
EOSINOPHIL # BLD AUTO: 0.1 K/UL (ref 0–0.5)
EOSINOPHIL NFR BLD: 0.7 % (ref 0–8)
ERYTHROCYTE [DISTWIDTH] IN BLOOD BY AUTOMATED COUNT: 13.2 % (ref 11.5–14.5)
EST. GFR  (AFRICAN AMERICAN): >60 ML/MIN/1.73 M^2
EST. GFR  (NON AFRICAN AMERICAN): >60 ML/MIN/1.73 M^2
GLUCOSE SERPL-MCNC: 114 MG/DL (ref 70–110)
HCO3 UR-SCNC: 18.8 MMOL/L (ref 24–28)
HCT VFR BLD AUTO: 37.8 % (ref 37–48.5)
HGB BLD-MCNC: 12.5 G/DL (ref 12–16)
IMM GRANULOCYTES # BLD AUTO: 0.03 K/UL (ref 0–0.04)
IMM GRANULOCYTES NFR BLD AUTO: 0.4 % (ref 0–0.5)
LYMPHOCYTES # BLD AUTO: 1.5 K/UL (ref 1–4.8)
LYMPHOCYTES NFR BLD: 19.1 % (ref 18–48)
MAGNESIUM SERPL-MCNC: 2 MG/DL (ref 1.6–2.6)
MCH RBC QN AUTO: 29.3 PG (ref 27–31)
MCHC RBC AUTO-ENTMCNC: 33.1 G/DL (ref 32–36)
MCV RBC AUTO: 89 FL (ref 82–98)
MODE: ABNORMAL
MONOCYTES # BLD AUTO: 0.6 K/UL (ref 0.3–1)
MONOCYTES NFR BLD: 7.9 % (ref 4–15)
NEUTROPHILS # BLD AUTO: 5.5 K/UL (ref 1.8–7.7)
NEUTROPHILS NFR BLD: 71.5 % (ref 38–73)
NRBC BLD-RTO: 0 /100 WBC
PCO2 BLDA: 28.3 MMHG (ref 35–45)
PH SMN: 7.43 [PH] (ref 7.35–7.45)
PHOSPHATE SERPL-MCNC: 2.1 MG/DL (ref 2.7–4.5)
PLATELET # BLD AUTO: 224 K/UL (ref 150–350)
PMV BLD AUTO: 9.6 FL (ref 9.2–12.9)
PO2 BLDA: 79 MMHG (ref 80–100)
POC BE: -6 MMOL/L
POC SATURATED O2: 96 % (ref 95–100)
POC TCO2: 20 MMOL/L (ref 23–27)
POCT GLUCOSE: 199 MG/DL (ref 70–110)
POTASSIUM SERPL-SCNC: 4.2 MMOL/L (ref 3.5–5.1)
PROT SERPL-MCNC: 6.4 G/DL (ref 6–8.4)
RBC # BLD AUTO: 4.26 M/UL (ref 4–5.4)
SAMPLE: ABNORMAL
SITE: ABNORMAL
SODIUM SERPL-SCNC: 137 MMOL/L (ref 136–145)
SP02: 97
WBC # BLD AUTO: 7.64 K/UL (ref 3.9–12.7)

## 2019-04-19 PROCEDURE — 99233 SBSQ HOSP IP/OBS HIGH 50: CPT | Mod: ,,, | Performed by: NURSE PRACTITIONER

## 2019-04-19 PROCEDURE — 84100 ASSAY OF PHOSPHORUS: CPT

## 2019-04-19 PROCEDURE — 99233 SBSQ HOSP IP/OBS HIGH 50: CPT | Mod: ,,, | Performed by: PSYCHIATRY & NEUROLOGY

## 2019-04-19 PROCEDURE — 25000003 PHARM REV CODE 250: Performed by: STUDENT IN AN ORGANIZED HEALTH CARE EDUCATION/TRAINING PROGRAM

## 2019-04-19 PROCEDURE — 99900035 HC TECH TIME PER 15 MIN (STAT)

## 2019-04-19 PROCEDURE — 25000003 PHARM REV CODE 250: Performed by: PSYCHIATRY & NEUROLOGY

## 2019-04-19 PROCEDURE — 63600175 PHARM REV CODE 636 W HCPCS: Performed by: NURSE PRACTITIONER

## 2019-04-19 PROCEDURE — 82803 BLOOD GASES ANY COMBINATION: CPT

## 2019-04-19 PROCEDURE — 99232 PR SUBSEQUENT HOSPITAL CARE,LEVL II: ICD-10-PCS | Mod: ,,, | Performed by: PHYSICIAN ASSISTANT

## 2019-04-19 PROCEDURE — 37799 UNLISTED PX VASCULAR SURGERY: CPT

## 2019-04-19 PROCEDURE — 25000003 PHARM REV CODE 250: Performed by: NURSE PRACTITIONER

## 2019-04-19 PROCEDURE — 83735 ASSAY OF MAGNESIUM: CPT

## 2019-04-19 PROCEDURE — 94761 N-INVAS EAR/PLS OXIMETRY MLT: CPT

## 2019-04-19 PROCEDURE — 99233 PR SUBSEQUENT HOSPITAL CARE,LEVL III: ICD-10-PCS | Mod: ,,, | Performed by: NURSE PRACTITIONER

## 2019-04-19 PROCEDURE — 85025 COMPLETE CBC W/AUTO DIFF WBC: CPT

## 2019-04-19 PROCEDURE — 80053 COMPREHEN METABOLIC PANEL: CPT

## 2019-04-19 PROCEDURE — 99232 SBSQ HOSP IP/OBS MODERATE 35: CPT | Mod: ,,, | Performed by: PHYSICIAN ASSISTANT

## 2019-04-19 PROCEDURE — 99233 PR SUBSEQUENT HOSPITAL CARE,LEVL III: ICD-10-PCS | Mod: ,,, | Performed by: PSYCHIATRY & NEUROLOGY

## 2019-04-19 PROCEDURE — 20000000 HC ICU ROOM

## 2019-04-19 RX ORDER — METHYLPREDNISOLONE SOD SUCC 125 MG
125 VIAL (EA) INJECTION DAILY PRN
Status: DISCONTINUED | OUTPATIENT
Start: 2019-04-19 | End: 2019-04-19

## 2019-04-19 RX ORDER — METHYLPREDNISOLONE SOD SUCC 125 MG
60 VIAL (EA) INJECTION EVERY 8 HOURS PRN
Status: DISCONTINUED | OUTPATIENT
Start: 2019-04-19 | End: 2019-04-28 | Stop reason: HOSPADM

## 2019-04-19 RX ORDER — POLYETHYLENE GLYCOL 3350 17 G/17G
17 POWDER, FOR SOLUTION ORAL DAILY
Status: DISCONTINUED | OUTPATIENT
Start: 2019-04-19 | End: 2019-04-28 | Stop reason: HOSPADM

## 2019-04-19 RX ADMIN — METHYLPREDNISOLONE SODIUM SUCCINATE 125 MG: 125 INJECTION, POWDER, FOR SOLUTION INTRAMUSCULAR; INTRAVENOUS at 05:04

## 2019-04-19 RX ADMIN — LEVETIRACETAM 500 MG: 500 TABLET ORAL at 09:04

## 2019-04-19 RX ADMIN — POLYETHYLENE GLYCOL 3350 17 G: 17 POWDER, FOR SOLUTION ORAL at 10:04

## 2019-04-19 RX ADMIN — ACETAMINOPHEN 650 MG: 325 TABLET ORAL at 01:04

## 2019-04-19 RX ADMIN — HEPARIN SODIUM 5000 UNITS: 5000 INJECTION, SOLUTION INTRAVENOUS; SUBCUTANEOUS at 05:04

## 2019-04-19 RX ADMIN — POTASSIUM & SODIUM PHOSPHATES POWDER PACK 280-160-250 MG 2 PACKET: 280-160-250 PACK at 08:04

## 2019-04-19 RX ADMIN — NIMODIPINE 60 MG: 30 CAPSULE, LIQUID FILLED ORAL at 09:04

## 2019-04-19 RX ADMIN — STANDARDIZED SENNA CONCENTRATE AND DOCUSATE SODIUM 1 TABLET: 8.6; 5 TABLET, FILM COATED ORAL at 09:04

## 2019-04-19 RX ADMIN — NIMODIPINE 60 MG: 30 CAPSULE, LIQUID FILLED ORAL at 06:04

## 2019-04-19 RX ADMIN — POTASSIUM & SODIUM PHOSPHATES POWDER PACK 280-160-250 MG 2 PACKET: 280-160-250 PACK at 04:04

## 2019-04-19 RX ADMIN — NIMODIPINE 60 MG: 30 CAPSULE, LIQUID FILLED ORAL at 02:04

## 2019-04-19 RX ADMIN — POTASSIUM & SODIUM PHOSPHATES POWDER PACK 280-160-250 MG 2 PACKET: 280-160-250 PACK at 11:04

## 2019-04-19 RX ADMIN — ACETAMINOPHEN 650 MG: 325 TABLET ORAL at 03:04

## 2019-04-19 RX ADMIN — HEPARIN SODIUM 5000 UNITS: 5000 INJECTION, SOLUTION INTRAVENOUS; SUBCUTANEOUS at 09:04

## 2019-04-19 RX ADMIN — NIMODIPINE 60 MG: 30 CAPSULE, LIQUID FILLED ORAL at 05:04

## 2019-04-19 RX ADMIN — NIMODIPINE 60 MG: 30 CAPSULE, LIQUID FILLED ORAL at 01:04

## 2019-04-19 RX ADMIN — HEPARIN SODIUM 5000 UNITS: 5000 INJECTION, SOLUTION INTRAVENOUS; SUBCUTANEOUS at 01:04

## 2019-04-19 NOTE — PROGRESS NOTES
[facility]  Neurocritical Care  Progress Note    Admit Date: 2019  Service Date: 2019  Length of Stay: 3    Subjective:     Chief Complaint: SAH (subarachnoid hemorrhage)    History of Present Illness: The patient is a 63-year-old female with PMH of HTN, HLD, Thyroid Disease, that presents as a transfer from [facility] ([facility]) for management of intracranial aneurysm.  Patient presented to [facility] ED with complaints of sudden onset severe headache with neck stiffness that began at 8am today. She acknowledges, headache, visual disturbances, nausea without vomiting, but denies numbness and tingling.  CT head and CTA brain were obtained. 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  There was no evidence of acute intracranial hemorrhage.   She  endorses having taken IBU but with no relief. Denies anticoagulant use. Of note,  patient's father  or intracerebral aneurysm at an early age.        Hospital Course:  Admit to NCC, Keppra, Nimitop, CTH, MRI   s/p Angio, ECHO, Permissive HTN   Solumedrol for HA, Start heparin, Start Diet   Boost added to diet, Solumedrol scheduled, Myralax, ABG    Interval History: Progressing well. OOBTC and to BSC with minimal assist. TCDs and Nimitop continue daily with no signsd of vasospasm noted. Solumedrol given this AM for c/o HA with immediate relief seen. Will schedule  Solumedrol for HA. Weakness improving with equal strength throughout. Boost added to meals to assist with supplementation until full appetite returns. Denies nausea since starting meals.     Review of Systems   Constitutional: Positive for activity change.   Eyes: Negative for photophobia and visual disturbance.   Respiratory: Negative for chest tightness and shortness of breath.    Cardiovascular: Negative for chest pain.   Gastrointestinal: Negative for abdominal pain, nausea and vomiting.   Genitourinary: Negative for flank pain.    Musculoskeletal: Negative for neck pain.   Neurological: Positive for weakness. Negative for headaches.   Psychiatric/Behavioral: Negative for agitation.       Objective:     Vitals:  Temp: 99.1 °F (37.3 °C)  Pulse: 72  Rhythm: normal sinus rhythm  BP: 134/63  MAP (mmHg): 91  Resp: 17  SpO2: 95 %  O2 Device (Oxygen Therapy): room air    Temp  Min: 98.2 °F (36.8 °C)  Max: 99.5 °F (37.5 °C)  Pulse  Min: 68  Max: 89  BP  Min: 117/56  Max: 163/73  MAP (mmHg)  Min: 81  Max: 105  Resp  Min: 9  Max: 27  SpO2  Min: 93 %  Max: 99 %    04/18 0701 - 04/19 0700  In: 2300 [P.O.:400; I.V.:1800]  Out: 1550 [Urine:1550]   Unmeasured Output  Urine Occurrence: 1  Pad Count: 1       Physical Exam   Constitutional: She appears well-developed and well-nourished.   HENT:   Head: Normocephalic.   Eyes: Pupils are equal, round, and reactive to light. EOM are normal.   Neck: Normal range of motion. No JVD present. No tracheal deviation present.   Cardiovascular: Normal rate and regular rhythm.   Pulmonary/Chest: Effort normal. No respiratory distress.   Abdominal: Soft. Bowel sounds are normal. She exhibits no distension.   Musculoskeletal: Normal range of motion.   Neurological:   Mental Status:  Awake, follows commands    Negative Aphasia Dysarthria Ataxia Apraxia   Pupils  mm, PERRL.   +Corneal reflex, +gag, +cough   MACK Equal strength throughout        Nursing note and vitals reviewed.    Unable to test coordination, gait due to level of consciousness.    Medications:  Continuous  sodium chloride 0.9% Last Rate: 75 mL/hr at 04/19/19 0701   Scheduled  heparin (porcine) 5,000 Units Q8H   levETIRAcetam 500 mg BID   niMODipine 60 mg Q4H   scopolamine 1 patch Once   senna-docusate 8.6-50 mg 1 tablet BID   PRN  acetaminophen 650 mg Q6H PRN   hydrALAZINE 10 mg Q4H PRN   magnesium oxide 800 mg PRN   magnesium oxide 800 mg PRN   ondansetron 4 mg Q6H PRN   oxyCODONE 5 mg Q6H PRN   potassium, sodium phosphates 2 packet PRN   potassium, sodium  phosphates 2 packet PRN   potassium, sodium phosphates 2 packet PRN   sodium chloride 0.9% 10 mL PRN   sodium chloride 0.9% 10 mL PRN     Today I personally reviewed pertinent medications, imaging, laboratory results, notably:    Diet  Diet Adult Regular (IDDSI Level 7) [facility]; Thin  Diet Adult Regular (IDDSI Level 7) [facility]; Thin          Assessment/Plan:     Neuro  * SAH (subarachnoid hemorrhage)  - Neurological checks q1h  - Seizure, fall, aspiration precautions  - Head of bed at 30 degrees at all times  Permissive HTN Post Secure Coiling  - TCD  daily  - Meds:  Nimodipine 60mg PO q4h  APAP 650 mg PO q6h PRN for pain or headache  Levetiracetam 5000mg bid (for seizure prophylaxis)  ___________________  Respiratory:  - Aspiration precautions, head of bed above 30 degrees  - PRN O2  ______________________________________________  Cardiology:  - Continuous cardiac telemetry  Permissive HTN  - TTE Stable, EF 60%  _______________________________________________  Renal:  - Renal function normal  - Monitor daily CMP, Mg, Phos  NS @ 75ml/h  Goal + 1L (+750ml this am 4/19)  _______________________________________________  Gastrointestinal:  - Meds:  1. Docusate 100 mg PO TID  ________________________________________________  Hematology:  - Monitor CBC daily   Sub Q heparin  ______________________________________________  Infectious Disease:  - Keep normothermic  - Meds:  ?      1. APAP 650mg q6hr PRN for fever   _______________________________________________  Prophylaxis:  DVT: SQ Heparin  GI: (hold for now)  ________________________________________________          Vasogenic cerebral edema  As seen on Imaging  Q1 hr Neuro Monitoring  Stat CT for changes in LOC      Headache  Tylenol PRN  Oxycodone PRN  Solumedrol Effective for HA and neck pain/stiffness    Cardiac/Vascular  Essential hypertension  Cardiac Monitoring  VS Q1 Hour  Permissive HTN post coil    Renal/  Compensated respiratory  alkalosis  PRN ABG  Monitor Respiratory Response, currently No signs of distress    Endocrine  Thyroid disease  History on Admit  TSH  Free T4    Other  S/P coil embolization of cerebral aneurysm  S/P Coil of ICA Aneurysm POD 01  Permissive HTN < 200  Continue Nimitop and Daily TCDs  ECHO complete/ EF 60%  Continue IVF  Tolerating Diet            The patient is being Prophylaxed for:  Venous Thromboembolism with: Chemical  Stress Ulcer with: None  Ventilator Pneumonia with: none    Activity Orders          Diet Adult Regular (IDDSI Level 7) [facility]; Thin: Regular starting at 04/18 0707        Full Code    Alexandro De La Cruz NP  Neurocritical Care  [facility]

## 2019-04-19 NOTE — ASSESSMENT & PLAN NOTE
Patient with complaint of sudden onset of HA this AM. Seen at [facility] for severe headache and photophobia. CTA completed which revealed left ICA saccular aneurysm. Patient transferred to [facility] for further neurological evaluation. Admitted to [facility]. NSx consulted.    MRI revealing SAH within interpeduncular fossa and the anterior basal cisterns. Small amount of IVH in the occipital horn of the R lateral ventricle. Patient s/p L ICA embolization w/ coiling completed in IR 4/17.    Continue Nimotop 60q4 x 21 days, and daily TCDs x 14 days, and Keppra 500bid x 7 days    Antithrombotics for secondary stroke prevention:  None due to aneurysm and potential SAH     Statins for secondary stroke prevention and hyperlipidemia, if present:   Continue Atorvastatin 40 mg     Aggressive risk factor modification: family hx of aneurysm, L ICA aneusym      Rehab efforts: PT/OT/SLP to evaluate and treat    Diagnostics ordered/pending: daily TCDs    VTE prophylaxis: None due to potential SAH; mechanical SCDs     BP parameters: SAH: Secured aneurysm, SBP < 200 per NICU

## 2019-04-19 NOTE — ASSESSMENT & PLAN NOTE
S/P Coil of ICA Aneurysm POD 01  Permissive HTN < 200  Continue Nimitop and Daily TCDs  ECHO complete/ EF 60%  Continue IVF  Tolerating Diet

## 2019-04-19 NOTE — ASSESSMENT & PLAN NOTE
63 year old female with a PMHx HTN, hypothyroidism presents as a transfer with a HH2 F4 SAH due to left hypophyseal saccular aneurysm, now s/p coil embolization on 4/17.    -Patient neurologically stable on exam  -Maintain head of bed > 30 degrees at all times  -Continue Keppra 500 BID x 7d for seizure prevention  -Continue TCDs daily x 14 days to monitor for vasospasm  -Continue Nimotop 60 mg q 4 hours x 21d for vasospasm prevention  -Continue permissive hypertension. Maintain SBP <200. SBP has been 109-152.  -Maintain Na >135. Currently Na is 137.  -Notify NSGY immediately if any neurostatus change

## 2019-04-19 NOTE — SUBJECTIVE & OBJECTIVE
Interval History: Progressing well. OOBTC and to BSC with minimal assist. TCDs and Nimitop continue daily with no signsd of vasospasm noted. Solumedrol given this AM for c/o HA with immediate relief seen. Will schedule  Solumedrol for HA. Weakness improving with equal strength throughout. Boost added to meals to assist with supplementation until full appetite returns. Denies nausea since starting meals.     Review of Systems   Constitutional: Positive for activity change.   Eyes: Negative for photophobia and visual disturbance.   Respiratory: Negative for chest tightness and shortness of breath.    Cardiovascular: Negative for chest pain.   Gastrointestinal: Negative for abdominal pain, nausea and vomiting.   Genitourinary: Negative for flank pain.   Musculoskeletal: Negative for neck pain.   Neurological: Positive for weakness. Negative for headaches.   Psychiatric/Behavioral: Negative for agitation.       Objective:     Vitals:  Temp: 99.1 °F (37.3 °C)  Pulse: 72  Rhythm: normal sinus rhythm  BP: 134/63  MAP (mmHg): 91  Resp: 17  SpO2: 95 %  O2 Device (Oxygen Therapy): room air    Temp  Min: 98.2 °F (36.8 °C)  Max: 99.5 °F (37.5 °C)  Pulse  Min: 68  Max: 89  BP  Min: 117/56  Max: 163/73  MAP (mmHg)  Min: 81  Max: 105  Resp  Min: 9  Max: 27  SpO2  Min: 93 %  Max: 99 %    04/18 0701 - 04/19 0700  In: 2300 [P.O.:400; I.V.:1800]  Out: 1550 [Urine:1550]   Unmeasured Output  Urine Occurrence: 1  Pad Count: 1       Physical Exam   Constitutional: She appears well-developed and well-nourished.   HENT:   Head: Normocephalic.   Eyes: Pupils are equal, round, and reactive to light. EOM are normal.   Neck: Normal range of motion. No JVD present. No tracheal deviation present.   Cardiovascular: Normal rate and regular rhythm.   Pulmonary/Chest: Effort normal. No respiratory distress.   Abdominal: Soft. Bowel sounds are normal. She exhibits no distension.   Musculoskeletal: Normal range of motion.   Neurological:   Mental  Status:  Awake, follows commands    Negative Aphasia Dysarthria Ataxia Apraxia   Pupils  mm, PERRL.   +Corneal reflex, +gag, +cough   MACK Equal strength throughout        Nursing note and vitals reviewed.    Unable to test coordination, gait due to level of consciousness.    Medications:  Continuous  sodium chloride 0.9% Last Rate: 75 mL/hr at 04/19/19 0701   Scheduled  heparin (porcine) 5,000 Units Q8H   levETIRAcetam 500 mg BID   niMODipine 60 mg Q4H   scopolamine 1 patch Once   senna-docusate 8.6-50 mg 1 tablet BID   PRN  acetaminophen 650 mg Q6H PRN   hydrALAZINE 10 mg Q4H PRN   magnesium oxide 800 mg PRN   magnesium oxide 800 mg PRN   ondansetron 4 mg Q6H PRN   oxyCODONE 5 mg Q6H PRN   potassium, sodium phosphates 2 packet PRN   potassium, sodium phosphates 2 packet PRN   potassium, sodium phosphates 2 packet PRN   sodium chloride 0.9% 10 mL PRN   sodium chloride 0.9% 10 mL PRN     Today I personally reviewed pertinent medications, imaging, laboratory results, notably:    Diet  Diet Adult Regular (IDDSI Level 7) [facility]; Thin  Diet Adult Regular (IDDSI Level 7) [facility]; Thin

## 2019-04-19 NOTE — ASSESSMENT & PLAN NOTE
63 year old female with a PMHx HTN, hypothyroidism presents as a transfer with a HH2 F4 SAH due to left hypophyseal saccular aneurysm, now s/p coil embolization on 4/17.    -Patient neurologically stable on exam  -Maintain head of bed > 30 degrees at all times  -Continue Keppra 500 BID x 7d for seizure prevention  -Continue TCDs daily x 14 days to monitor for vasospasm  -Continue Nimotop 60 mg q 4 hours x 21d for vasospasm prevention  -Continue permissive hypertension. Maintain SBP <200  -Maintain Na >135. Currently Na is 140.   -Strict I/O, net even to +500  -Daily PT/OT  -Notify NSGY immediately if any neurostatus change  -Will continue to follow

## 2019-04-19 NOTE — ASSESSMENT & PLAN NOTE
Stroke risk factor  SBP <200 for secured aneurysm  Cardene dc'ed per NCC   SBP within goal at this time

## 2019-04-19 NOTE — SUBJECTIVE & OBJECTIVE
Neurologic Chief Complaint: headache, s/p coiling of L ICA aneurysm    Subjective:     Interval History: Patient is seen for follow-up neurological assessment and treatment recommendations: AARON. Denies any headaches, neurologically stable.     HPI, Past Medical, Family, and Social History remains the same as documented in the initial encounter.     Review of Systems  Scheduled Meds:   heparin (porcine)  5,000 Units Subcutaneous Q8H    levETIRAcetam  500 mg Oral BID    niMODipine  60 mg Oral Q4H    polyethylene glycol  17 g Oral Daily    scopolamine  1 patch Transdermal Once    senna-docusate 8.6-50 mg  1 tablet Oral BID     Continuous Infusions:   sodium chloride 0.9% 75 mL/hr at 04/19/19 0701     PRN Meds:acetaminophen, hydrALAZINE, magnesium oxide, magnesium oxide, methylPREDNISolone sodium succinate, ondansetron, oxyCODONE, potassium, sodium phosphates, potassium, sodium phosphates, potassium, sodium phosphates, sodium chloride 0.9%, sodium chloride 0.9%    Objective:     Vital Signs (Most Recent):  Temp: 99.1 °F (37.3 °C) (04/19/19 0701)  Pulse: 80 (04/19/19 0901)  Resp: (!) 27 (04/19/19 0901)  BP: (!) 154/67 (04/19/19 0901)  SpO2: 96 % (04/19/19 0901)  BP Location: Left arm    Vital Signs Range (Last 24H):  Temp:  [98.2 °F (36.8 °C)-99.1 °F (37.3 °C)]   Pulse:  [68-89]   Resp:  [9-28]   BP: (109-163)/(56-78)   SpO2:  [93 %-99 %]   Arterial Line BP: ()/()   BP Location: Left arm    Physical Exam  Constitutional: She is oriented to person, place, and time. She appears well-developed.   HENT:   Head: Normocephalic and atraumatic.   Eyes: Pupils are equal, round, and reactive to light. EOM are normal.   Cardiovascular: Normal rate.   Pulmonary/Chest: Effort normal.   Musculoskeletal: Normal range of motion.   Neurological: She is alert and oriented to person, place, and time.   Skin: Skin is warm. Capillary refill takes less than 2 seconds.   Psychiatric: She has a normal mood and affect.          Neurological Exam:   LOC: alert  Attention Span: Good   Language: No aphasia  Articulation: No dysarthria  Orientation: Person, Place, Time   Visual Fields: Full  EOM (CN III, IV, VI): Full/intact  Pupils (CN II, III): PERRL  Facial Sensation (CN V): Normal  Motor: Arm left  Normal 5/5  Leg left  Normal 5/5  Arm right  Normal 5/5  Leg right Normal 5/5  Cebellar: No evidence of appendicular or axial ataxia  Sensation: Intact to light touch, temperature and vibration      Laboratory:  CMP:   Recent Labs   Lab 04/19/19  0322   CALCIUM 8.4*   ALBUMIN 3.3*   PROT 6.4      K 4.2   CO2 19*      BUN 9   CREATININE 0.7   ALKPHOS 63   ALT 17   AST 18   BILITOT 0.5     CBC:   Recent Labs   Lab 04/19/19  0322   WBC 7.64   RBC 4.26   HGB 12.5   HCT 37.8      MCV 89   MCH 29.3   MCHC 33.1     Lipid Panel:   Recent Labs   Lab 04/16/19  1817   CHOL 178   LDLCALC 101.8   HDL 62   TRIG 71     Hgb A1C:   Recent Labs   Lab 04/16/19  1817   HGBA1C 5.5     TSH:   Recent Labs   Lab 04/16/19  1817   TSH 0.864       Diagnostic Results     Brain Imaging  MRI MRA Brain w wo 4/16  1.6 cm saccular aneurysm arising from the supraclinoid segment of the left ICA.  No additional aneurysm is identified, allowing for the low sensitivity of MRA for small aneurysms.     Vessel Imaging   See above    Cardiac Imaging   TTE 4/17  · Concentric left ventricular remodeling.  · Normal left ventricular systolic function. The estimated ejection fraction is 60%  · Normal LV diastolic function.  · Normal right ventricular systolic function.  · Mild tricuspid regurgitation.  · Normal central venous pressure (3 mm Hg).  · The estimated PA systolic pressure is 40 mm Hg

## 2019-04-19 NOTE — PROGRESS NOTES
[facility]  Neurosurgery  Progress Note    Subjective:     History of Present Illness: 63 year old female with PMHx HTN, hypothyroidism presents as a transfer from [facility] for evaluation of left ICA saccular aneurysm. Patient's family at bedside providing majority of history. Patient reports severe posterior headache, neck pain, photophobia starting at 930am. Subsequently brought to the ED by her family. Denies weakness, numbness, paresthesias, seizure activity, LOC, falls/trauma. Family at bedside state patient's father  in his 30s of intracerebral aneurysmal rupture. Transferred to [facility] for higher level of care. Denies use of anticoagulants or antiplatelet therapy. Reports compliance with BP medication however family states SBP is usually ~150s at home. Review of coags at OSH all within normal limits: aPTT 28, INR 0.9, PT 12.7.    Post-Op Info:  Procedure(s) (LRB):  ANGIOGRAM-CEREBRAL (Bilateral)   2 Days Post-Op     Interval History:   NAEON. Patient resting comfortably in bed.  at bedside. Patient denies any headaches currently, weakness, vision changes, or any new symptoms.    Medications:  Continuous Infusions:   sodium chloride 0.9% 75 mL/hr at 19 1101     Scheduled Meds:   heparin (porcine)  5,000 Units Subcutaneous Q8H    levETIRAcetam  500 mg Oral BID    niMODipine  60 mg Oral Q4H    polyethylene glycol  17 g Oral Daily    scopolamine  1 patch Transdermal Once    senna-docusate 8.6-50 mg  1 tablet Oral BID     PRN Meds:acetaminophen, hydrALAZINE, magnesium oxide, magnesium oxide, methylPREDNISolone sodium succinate, ondansetron, oxyCODONE, potassium, sodium phosphates, potassium, sodium phosphates, potassium, sodium phosphates, sodium chloride 0.9%, sodium chloride 0.9%     Review of Systems  Objective:     Weight: 77.1 kg (169 lb 15.6 oz)  Body mass index is 26.62 kg/m².  Vital Signs (Most Recent):  Temp: 98.2 °F (36.8 °C) (19 1101)  Pulse: 70  (04/19/19 1101)  Resp: (!) 25 (04/19/19 1101)  BP: (!) 152/78 (04/19/19 1101)  SpO2: 97 % (04/19/19 1101) Vital Signs (24h Range):  Temp:  [98.2 °F (36.8 °C)-99.1 °F (37.3 °C)] 98.2 °F (36.8 °C)  Pulse:  [68-89] 70  Resp:  [9-29] 25  SpO2:  [93 %-98 %] 97 %  BP: (109-163)/(56-78) 152/78  Arterial Line BP: ()/() 146/74     Date 04/19/19 0700 - 04/20/19 0659   Shift 9312-5598 1335-4687 0860-7075 24 Hour Total   INTAKE   P.O. 400   400   I.V.(mL/kg) 376.3(4.9)   376.3(4.9)   Shift Total(mL/kg) 776.3(10.1)   776.3(10.1)   OUTPUT   Urine(mL/kg/hr) 1000   1000   Shift Total(mL/kg) 1000(13)   1000(13)   Weight (kg) 77.1 77.1 77.1 77.1       Neurosurgery Physical Exam  General: well developed, well nourished, no distress.   Head: normocephalic, atraumatic  Neurologic: Alert and oriented. Thought content appropriate.  GCS: Motor: 6/Verbal: 5/Eyes: 4 GCS Total: 15  Mental Status: Awake, Alert, Oriented x 4  Language: No aphasia  Speech: No dysarthria  Cranial nerves: face symmetric, tongue midline, CN II-XII grossly intact.   Eyes: pupils equal, round, reactive to light with accomodation, EOMI.   Pulmonary: normal respirations, no signs of respiratory distress  Abdomen: soft, non-distended, not tender to palpation  Skin: Skin is warm, dry and intact.  Sensory: intact to light touch throughout  Motor Strength:Moves all extremities spontaneously with good tone.  Full strength upper and lower extremities. No abnormal movements seen.   Babinski's: Negative.  Clonus: Negative.  Finger-to-nose normal.   Pronator drift: absent bilaterally      Significant Labs:  Recent Labs   Lab 04/18/19  0205 04/18/19  1426 04/19/19  0322   *  --  114*     --  137   K 3.8 3.4* 4.2   *  --  110   CO2 21*  --  19*   BUN 11  --  9   CREATININE 0.7  --  0.7   CALCIUM 8.2*  --  8.4*   MG 2.0  --  2.0     Recent Labs   Lab 04/18/19  0205 04/19/19  0322   WBC 9.83 7.64   HGB 12.2 12.5   HCT 36.2* 37.8    224          Assessment/Plan:     * SAH (subarachnoid hemorrhage)  63 year old female with a PMHx HTN, hypothyroidism presents as a transfer with a HH2 F4 SAH due to left hypophyseal saccular aneurysm, now s/p coil embolization on 4/17.    -Patient neurologically stable on exam  -Maintain head of bed > 30 degrees at all times  -Continue Keppra 500 BID x 7d for seizure prevention  -Continue TCDs daily x 14 days to monitor for vasospasm  -Continue Nimotop 60 mg q 4 hours x 21d for vasospasm prevention  -Continue permissive hypertension. Maintain SBP <200. SBP has been 109-152.  -Maintain Na >135. Currently Na is 137.  -Notify NSGY immediately if any neurostatus change  -Will plan on repeating angio as an outpatient. Will wait at least 6-8 weeks to allow SAH to improve.         Discussed with Dr. Mak  Please call with any questions      Carrol Jimenez PA-C   Neurosurgery   Pager: [phone]

## 2019-04-19 NOTE — PLAN OF CARE
Problem: Adult Inpatient Plan of Care  Goal: Plan of Care Review  Outcome: Ongoing (interventions implemented as appropriate)  POC reviewed with pt and family at 1400. Pt verbalized understanding. Questions and concerns addressed. No acute events today. Pt progressing toward goals. Phosphorus replaced. Pain controlled with tylenol; administered X1 this shift for H/A 4/10 on numeric pain scale.  Pt in good spirits all day, surrounded by family members. Will continue to monitor. See flowsheets for full assessment and VS info.

## 2019-04-19 NOTE — SUBJECTIVE & OBJECTIVE
Interval History:   NAEON. Patient resting comfortably in bed.  at bedside. Patient denies any headaches currently, weakness, vision changes, or any new symptoms.    Medications:  Continuous Infusions:   sodium chloride 0.9% 75 mL/hr at 04/19/19 1101     Scheduled Meds:   heparin (porcine)  5,000 Units Subcutaneous Q8H    levETIRAcetam  500 mg Oral BID    niMODipine  60 mg Oral Q4H    polyethylene glycol  17 g Oral Daily    scopolamine  1 patch Transdermal Once    senna-docusate 8.6-50 mg  1 tablet Oral BID     PRN Meds:acetaminophen, hydrALAZINE, magnesium oxide, magnesium oxide, methylPREDNISolone sodium succinate, ondansetron, oxyCODONE, potassium, sodium phosphates, potassium, sodium phosphates, potassium, sodium phosphates, sodium chloride 0.9%, sodium chloride 0.9%     Review of Systems  Objective:     Weight: 77.1 kg (169 lb 15.6 oz)  Body mass index is 26.62 kg/m².  Vital Signs (Most Recent):  Temp: 98.2 °F (36.8 °C) (04/19/19 1101)  Pulse: 70 (04/19/19 1101)  Resp: (!) 25 (04/19/19 1101)  BP: (!) 152/78 (04/19/19 1101)  SpO2: 97 % (04/19/19 1101) Vital Signs (24h Range):  Temp:  [98.2 °F (36.8 °C)-99.1 °F (37.3 °C)] 98.2 °F (36.8 °C)  Pulse:  [68-89] 70  Resp:  [9-29] 25  SpO2:  [93 %-98 %] 97 %  BP: (109-163)/(56-78) 152/78  Arterial Line BP: ()/() 146/74     Date 04/19/19 0700 - 04/20/19 0659   Shift 7887-8488 6556-6938 9374-8066 24 Hour Total   INTAKE   P.O. 400   400   I.V.(mL/kg) 376.3(4.9)   376.3(4.9)   Shift Total(mL/kg) 776.3(10.1)   776.3(10.1)   OUTPUT   Urine(mL/kg/hr) 1000   1000   Shift Total(mL/kg) 1000(13)   1000(13)   Weight (kg) 77.1 77.1 77.1 77.1       Neurosurgery Physical Exam  General: well developed, well nourished, no distress.   Head: normocephalic, atraumatic  Neurologic: Alert and oriented. Thought content appropriate.  GCS: Motor: 6/Verbal: 5/Eyes: 4 GCS Total: 15  Mental Status: Awake, Alert, Oriented x 4  Language: No aphasia  Speech: No  dysarthria  Cranial nerves: face symmetric, tongue midline, CN II-XII grossly intact.   Eyes: pupils equal, round, reactive to light with accomodation, EOMI.   Pulmonary: normal respirations, no signs of respiratory distress  Abdomen: soft, non-distended, not tender to palpation  Skin: Skin is warm, dry and intact.  Sensory: intact to light touch throughout  Motor Strength:Moves all extremities spontaneously with good tone.  Full strength upper and lower extremities. No abnormal movements seen.   Babinski's: Negative.  Clonus: Negative.  Finger-to-nose normal.   Pronator drift: absent bilaterally      Significant Labs:  Recent Labs   Lab 04/18/19  0205 04/18/19  1426 04/19/19  0322   *  --  114*     --  137   K 3.8 3.4* 4.2   *  --  110   CO2 21*  --  19*   BUN 11  --  9   CREATININE 0.7  --  0.7   CALCIUM 8.2*  --  8.4*   MG 2.0  --  2.0     Recent Labs   Lab 04/18/19  0205 04/19/19  0322   WBC 9.83 7.64   HGB 12.2 12.5   HCT 36.2* 37.8    224

## 2019-04-19 NOTE — ASSESSMENT & PLAN NOTE
- Neurological checks q1h  - Seizure, fall, aspiration precautions  - Head of bed at 30 degrees at all times  Permissive HTN Post Secure Coiling  - TCD  daily  - Meds:  Nimodipine 60mg PO q4h  APAP 650 mg PO q6h PRN for pain or headache  Levetiracetam 5000mg bid (for seizure prophylaxis)  ___________________  Respiratory:  - Aspiration precautions, head of bed above 30 degrees  - PRN O2  ______________________________________________  Cardiology:  - Continuous cardiac telemetry  Permissive HTN  - TTE Stable, EF 60%  _______________________________________________  Renal:  - Renal function normal  - Monitor daily CMP, Mg, Phos  NS @ 75ml/h  Goal + 1L (+750ml this am 4/19)  _______________________________________________  Gastrointestinal:  - Meds:  1. Docusate 100 mg PO TID  ________________________________________________  Hematology:  - Monitor CBC daily   Sub Q heparin  ______________________________________________  Infectious Disease:  - Keep normothermic  - Meds:  ?      1. APAP 650mg q6hr PRN for fever   _______________________________________________  Prophylaxis:  DVT: SQ Heparin  GI: (hold for now)  ________________________________________________

## 2019-04-20 PROBLEM — E78.2 HYPERLIPIDEMIA, MIXED: Status: ACTIVE | Noted: 2019-04-20

## 2019-04-20 PROBLEM — K21.9 GERD WITHOUT ESOPHAGITIS: Status: ACTIVE | Noted: 2019-04-20

## 2019-04-20 LAB
ALBUMIN SERPL BCP-MCNC: 3 G/DL (ref 3.5–5.2)
ALP SERPL-CCNC: 62 U/L (ref 55–135)
ALT SERPL W/O P-5'-P-CCNC: 15 U/L (ref 10–44)
ANION GAP SERPL CALC-SCNC: 9 MMOL/L (ref 8–16)
AST SERPL-CCNC: 15 U/L (ref 10–40)
BASOPHILS # BLD AUTO: 0.02 K/UL (ref 0–0.2)
BASOPHILS NFR BLD: 0.2 % (ref 0–1.9)
BILIRUB SERPL-MCNC: 0.5 MG/DL (ref 0.1–1)
BUN SERPL-MCNC: 11 MG/DL (ref 8–23)
CALCIUM SERPL-MCNC: 8.4 MG/DL (ref 8.7–10.5)
CHLORIDE SERPL-SCNC: 110 MMOL/L (ref 95–110)
CO2 SERPL-SCNC: 21 MMOL/L (ref 23–29)
CREAT SERPL-MCNC: 0.7 MG/DL (ref 0.5–1.4)
DIFFERENTIAL METHOD: ABNORMAL
EOSINOPHIL # BLD AUTO: 0 K/UL (ref 0–0.5)
EOSINOPHIL NFR BLD: 0.1 % (ref 0–8)
ERYTHROCYTE [DISTWIDTH] IN BLOOD BY AUTOMATED COUNT: 12.6 % (ref 11.5–14.5)
EST. GFR  (AFRICAN AMERICAN): >60 ML/MIN/1.73 M^2
EST. GFR  (NON AFRICAN AMERICAN): >60 ML/MIN/1.73 M^2
GLUCOSE SERPL-MCNC: 108 MG/DL (ref 70–110)
HCT VFR BLD AUTO: 36 % (ref 37–48.5)
HGB BLD-MCNC: 11.9 G/DL (ref 12–16)
IMM GRANULOCYTES # BLD AUTO: 0.07 K/UL (ref 0–0.04)
IMM GRANULOCYTES NFR BLD AUTO: 0.7 % (ref 0–0.5)
LYMPHOCYTES # BLD AUTO: 1.8 K/UL (ref 1–4.8)
LYMPHOCYTES NFR BLD: 19.1 % (ref 18–48)
MAGNESIUM SERPL-MCNC: 1.8 MG/DL (ref 1.6–2.6)
MCH RBC QN AUTO: 29.4 PG (ref 27–31)
MCHC RBC AUTO-ENTMCNC: 33.1 G/DL (ref 32–36)
MCV RBC AUTO: 89 FL (ref 82–98)
MONOCYTES # BLD AUTO: 0.8 K/UL (ref 0.3–1)
MONOCYTES NFR BLD: 8.8 % (ref 4–15)
NEUTROPHILS # BLD AUTO: 6.8 K/UL (ref 1.8–7.7)
NEUTROPHILS NFR BLD: 71.1 % (ref 38–73)
NRBC BLD-RTO: 0 /100 WBC
PHOSPHATE SERPL-MCNC: 3.7 MG/DL (ref 2.7–4.5)
PHOSPHATE SERPL-MCNC: 3.7 MG/DL (ref 2.7–4.5)
PLATELET # BLD AUTO: 248 K/UL (ref 150–350)
PMV BLD AUTO: 9.7 FL (ref 9.2–12.9)
POTASSIUM SERPL-SCNC: 4 MMOL/L (ref 3.5–5.1)
PROT SERPL-MCNC: 6 G/DL (ref 6–8.4)
RBC # BLD AUTO: 4.05 M/UL (ref 4–5.4)
SODIUM SERPL-SCNC: 140 MMOL/L (ref 136–145)
WBC # BLD AUTO: 9.57 K/UL (ref 3.9–12.7)

## 2019-04-20 PROCEDURE — 25000003 PHARM REV CODE 250: Performed by: NURSE PRACTITIONER

## 2019-04-20 PROCEDURE — 20000000 HC ICU ROOM

## 2019-04-20 PROCEDURE — 25000003 PHARM REV CODE 250: Performed by: PSYCHIATRY & NEUROLOGY

## 2019-04-20 PROCEDURE — 36415 COLL VENOUS BLD VENIPUNCTURE: CPT

## 2019-04-20 PROCEDURE — 94761 N-INVAS EAR/PLS OXIMETRY MLT: CPT

## 2019-04-20 PROCEDURE — 80053 COMPREHEN METABOLIC PANEL: CPT

## 2019-04-20 PROCEDURE — 85025 COMPLETE CBC W/AUTO DIFF WBC: CPT

## 2019-04-20 PROCEDURE — 99233 PR SUBSEQUENT HOSPITAL CARE,LEVL III: ICD-10-PCS | Mod: ,,, | Performed by: NEUROLOGICAL SURGERY

## 2019-04-20 PROCEDURE — 25000003 PHARM REV CODE 250: Performed by: STUDENT IN AN ORGANIZED HEALTH CARE EDUCATION/TRAINING PROGRAM

## 2019-04-20 PROCEDURE — 63600175 PHARM REV CODE 636 W HCPCS: Performed by: NURSE PRACTITIONER

## 2019-04-20 PROCEDURE — 99233 PR SUBSEQUENT HOSPITAL CARE,LEVL III: ICD-10-PCS | Mod: ,,, | Performed by: NURSE PRACTITIONER

## 2019-04-20 PROCEDURE — 83735 ASSAY OF MAGNESIUM: CPT

## 2019-04-20 PROCEDURE — 84100 ASSAY OF PHOSPHORUS: CPT

## 2019-04-20 PROCEDURE — 99233 SBSQ HOSP IP/OBS HIGH 50: CPT | Mod: ,,, | Performed by: NEUROLOGICAL SURGERY

## 2019-04-20 PROCEDURE — 99233 SBSQ HOSP IP/OBS HIGH 50: CPT | Mod: ,,, | Performed by: NURSE PRACTITIONER

## 2019-04-20 RX ORDER — LEVOTHYROXINE SODIUM 100 UG/1
100 TABLET ORAL
Status: DISCONTINUED | OUTPATIENT
Start: 2019-04-20 | End: 2019-04-28 | Stop reason: HOSPADM

## 2019-04-20 RX ORDER — FENTANYL CITRATE 50 UG/ML
12.5 INJECTION, SOLUTION INTRAMUSCULAR; INTRAVENOUS DAILY PRN
Status: DISCONTINUED | OUTPATIENT
Start: 2019-04-20 | End: 2019-04-26

## 2019-04-20 RX ORDER — ATORVASTATIN CALCIUM 10 MG/1
10 TABLET, FILM COATED ORAL DAILY
Status: DISCONTINUED | OUTPATIENT
Start: 2019-04-20 | End: 2019-04-28 | Stop reason: HOSPADM

## 2019-04-20 RX ORDER — PANTOPRAZOLE SODIUM 40 MG/1
40 TABLET, DELAYED RELEASE ORAL DAILY
Status: DISCONTINUED | OUTPATIENT
Start: 2019-04-20 | End: 2019-04-25

## 2019-04-20 RX ADMIN — NIMODIPINE 60 MG: 30 CAPSULE, LIQUID FILLED ORAL at 09:04

## 2019-04-20 RX ADMIN — OXYCODONE HYDROCHLORIDE 5 MG: 5 TABLET ORAL at 08:04

## 2019-04-20 RX ADMIN — NIMODIPINE 60 MG: 30 CAPSULE, LIQUID FILLED ORAL at 05:04

## 2019-04-20 RX ADMIN — STANDARDIZED SENNA CONCENTRATE AND DOCUSATE SODIUM 1 TABLET: 8.6; 5 TABLET, FILM COATED ORAL at 09:04

## 2019-04-20 RX ADMIN — LEVETIRACETAM 500 MG: 500 TABLET ORAL at 09:04

## 2019-04-20 RX ADMIN — METHYLPREDNISOLONE SODIUM SUCCINATE 60 MG: 125 INJECTION, POWDER, FOR SOLUTION INTRAMUSCULAR; INTRAVENOUS at 09:04

## 2019-04-20 RX ADMIN — NIMODIPINE 60 MG: 30 CAPSULE, LIQUID FILLED ORAL at 01:04

## 2019-04-20 RX ADMIN — ATORVASTATIN CALCIUM 10 MG: 10 TABLET, FILM COATED ORAL at 10:04

## 2019-04-20 RX ADMIN — ACETAMINOPHEN 650 MG: 325 TABLET ORAL at 10:04

## 2019-04-20 RX ADMIN — HEPARIN SODIUM 5000 UNITS: 5000 INJECTION, SOLUTION INTRAVENOUS; SUBCUTANEOUS at 01:04

## 2019-04-20 RX ADMIN — METHYLPREDNISOLONE SODIUM SUCCINATE 60 MG: 125 INJECTION, POWDER, FOR SOLUTION INTRAMUSCULAR; INTRAVENOUS at 11:04

## 2019-04-20 RX ADMIN — NIMODIPINE 60 MG: 30 CAPSULE, LIQUID FILLED ORAL at 06:04

## 2019-04-20 RX ADMIN — PANTOPRAZOLE SODIUM 40 MG: 40 TABLET, DELAYED RELEASE ORAL at 10:04

## 2019-04-20 RX ADMIN — LEVOTHYROXINE SODIUM 100 MCG: 50 TABLET ORAL at 10:04

## 2019-04-20 RX ADMIN — HEPARIN SODIUM 5000 UNITS: 5000 INJECTION, SOLUTION INTRAVENOUS; SUBCUTANEOUS at 09:04

## 2019-04-20 RX ADMIN — HEPARIN SODIUM 5000 UNITS: 5000 INJECTION, SOLUTION INTRAVENOUS; SUBCUTANEOUS at 06:04

## 2019-04-20 RX ADMIN — NIMODIPINE 60 MG: 30 CAPSULE, LIQUID FILLED ORAL at 02:04

## 2019-04-20 RX ADMIN — POLYETHYLENE GLYCOL 3350 17 G: 17 POWDER, FOR SOLUTION ORAL at 09:04

## 2019-04-20 RX ADMIN — SODIUM CHLORIDE 1000 ML: 0.9 INJECTION, SOLUTION INTRAVENOUS at 06:04

## 2019-04-20 RX ADMIN — ACETAMINOPHEN 650 MG: 325 TABLET ORAL at 02:04

## 2019-04-20 NOTE — ASSESSMENT & PLAN NOTE
- Neurological checks q1h  - Seizure, fall, aspiration precautions  - Head of bed at 30 degrees at all times  Permissive HTN Post Secure Coilingon 4/17  - TCD  daily  - Meds:  Nimodipine 60mg PO q4h  APAP 650 mg PO q6h PRN for pain or headache  Methylprednisolone 60mg q8 prn H/A  Levetiracetam 5000mg bid (for seizure prophylaxis)

## 2019-04-20 NOTE — PROGRESS NOTES
[facility]  Neurosurgery  Progress Note    Subjective:     History of Present Illness: 63 year old female with PMHx HTN, hypothyroidism presents as a transfer from [facility] for evaluation of left ICA saccular aneurysm. Patient's family at bedside providing majority of history. Patient reports severe posterior headache, neck pain, photophobia starting at 930am. Subsequently brought to the ED by her family. Denies weakness, numbness, paresthesias, seizure activity, LOC, falls/trauma. Family at bedside state patient's father  in his 30s of intracerebral aneurysmal rupture. Transferred to [facility] for higher level of care. Denies use of anticoagulants or antiplatelet therapy. Reports compliance with BP medication however family states SBP is usually ~150s at home. Review of coags at OSH all within normal limits: aPTT 28, INR 0.9, PT 12.7.    Post-Op Info:  Procedure(s) (LRB):  ANGIOGRAM-CEREBRAL (Bilateral)   3 Days Post-Op     Interval History:    NAEON. No issues per nursing. HA improving. TCD without sonographic evidence for vasospasm. Intact on exam.     Medications:  Continuous Infusions:   sodium chloride 0.9% 75 mL/hr at 19 0805     Scheduled Meds:   heparin (porcine)  5,000 Units Subcutaneous Q8H    levETIRAcetam  500 mg Oral BID    niMODipine  60 mg Oral Q4H    polyethylene glycol  17 g Oral Daily    senna-docusate 8.6-50 mg  1 tablet Oral BID     PRN Meds:acetaminophen, hydrALAZINE, magnesium oxide, magnesium oxide, methylPREDNISolone sodium succinate, ondansetron, oxyCODONE, potassium, sodium phosphates, potassium, sodium phosphates, potassium, sodium phosphates, sodium chloride 0.9%, sodium chloride 0.9%     Review of Systems    Objective:     Weight: 77 kg (169 lb 12.1 oz)  Body mass index is 26.59 kg/m².  Vital Signs (Most Recent):  Temp: 98.2 °F (36.8 °C) (19 0705)  Pulse: 73 (19 08)  Resp: (!) 22 (19 08)  BP: (!) 129/58 (19  0805)  SpO2: 96 % (04/20/19 0807) Vital Signs (24h Range):  Temp:  [98.2 °F (36.8 °C)-98.8 °F (37.1 °C)] 98.2 °F (36.8 °C)  Pulse:  [54-93] 73  Resp:  [11-30] 22  SpO2:  [94 %-99 %] 96 %  BP: (129-170)/(58-79) 129/58  Arterial Line BP: (110-153)/(55-84) 129/58     Date 04/20/19 0700 - 04/21/19 0659   Shift [id number] 3538-3728 9214-6790 24 Hour Total   INTAKE   I.V.(mL/kg) 151.3(2)   151.3(2)   Shift Total(mL/kg) 151.3(2)   151.3(2)   OUTPUT   Shift Total(mL/kg)       Weight (kg) 77 77 77 77       Neurosurgery Physical Exam    [id number]  Face symm  PERRLA  TM, CN 2-12 grossly normal  MACK, FCX4  Groin soft           Significant Labs:  Recent Labs   Lab 04/18/19  1426 04/19/19  0322 04/20/19 0223   GLU  --  114* 108   NA  --  137 140   K 3.4* 4.2 4.0   CL  --  110 110   CO2  --  19* 21*   BUN  --  9 11   CREATININE  --  0.7 0.7   CALCIUM  --  8.4* 8.4*   MG  --  2.0 1.8     Recent Labs   Lab 04/19/19  0322 04/20/19 0223   WBC 7.64 9.57   HGB 12.5 11.9*   HCT 37.8 36.0*    248         Assessment/Plan:     * SAH (subarachnoid hemorrhage)  63 year old female with a PMHx HTN, hypothyroidism presents as a transfer with a HH2 F4 SAH due to left hypophyseal saccular aneurysm, now s/p coil embolization on 4/17.    -Patient neurologically stable on exam  -Maintain head of bed > 30 degrees at all times  -Continue Keppra 500 BID x 7d for seizure prevention  -Continue TCDs daily x 14 days to monitor for vasospasm  -Continue Nimotop 60 mg q 4 hours x 21d for vasospasm prevention  -Continue permissive hypertension. Maintain SBP <200  -Maintain Na >135. Currently Na is 140.   -Strict I/O, net even to +500  -Daily PT/OT  -Notify NSGY immediately if any neurostatus change  -Will continue to follow        Uzma Celaya MD  Neurosurgery  [facility]

## 2019-04-20 NOTE — ASSESSMENT & PLAN NOTE
S/P Coil of ICA Aneurysm PCD3/PBD 4  Permissive HTN < 200  Continue Nimitop and Daily TCDs  ECHO complete/ EF 60%  Continue IVF NS 75cc/h  Bolus 500cc to match urine output  Tolerating Diet

## 2019-04-20 NOTE — ASSESSMENT & PLAN NOTE
Cardiac Monitoring  VS Q1 Hour  Permissive HTN post coil  Prn hydralazine   Echo; EF 60% mild TR; Pulm. htn

## 2019-04-20 NOTE — PROGRESS NOTES
This patient's chart was reviewed by a stroke team provider.  Patient is neurologically stable with improving HA. NAEON. Continuing vasospasm watch, permissive HTN, matching I/Os, etc.    TCD 4/19 was without evidence of increased velocities/vasospasm. There is no other new imaging to review.  Pending diagnostics to follow up on include: Daily TCDs.      There are no new recommendations at this time; for other recommendations please see our previous note completed on 4/19/19.   Will continue to follow. Discussed patient with staff. Please contact stroke team for any questions or concerns.        Brenda Cervantes PA-C  [facility] [id number]  Department of Vascular Neurology   [facility]

## 2019-04-20 NOTE — SUBJECTIVE & OBJECTIVE
Interval History:    NAEON. No issues per nursing. HA improving. TCD without sonographic evidence for vasospasm. Intact on exam.     Medications:  Continuous Infusions:   sodium chloride 0.9% 75 mL/hr at 04/20/19 0805     Scheduled Meds:   heparin (porcine)  5,000 Units Subcutaneous Q8H    levETIRAcetam  500 mg Oral BID    niMODipine  60 mg Oral Q4H    polyethylene glycol  17 g Oral Daily    senna-docusate 8.6-50 mg  1 tablet Oral BID     PRN Meds:acetaminophen, hydrALAZINE, magnesium oxide, magnesium oxide, methylPREDNISolone sodium succinate, ondansetron, oxyCODONE, potassium, sodium phosphates, potassium, sodium phosphates, potassium, sodium phosphates, sodium chloride 0.9%, sodium chloride 0.9%     Review of Systems    Objective:     Weight: 77 kg (169 lb 12.1 oz)  Body mass index is 26.59 kg/m².  Vital Signs (Most Recent):  Temp: 98.2 °F (36.8 °C) (04/20/19 0705)  Pulse: 73 (04/20/19 0807)  Resp: (!) 22 (04/20/19 0807)  BP: (!) 129/58 (04/20/19 0805)  SpO2: 96 % (04/20/19 0807) Vital Signs (24h Range):  Temp:  [98.2 °F (36.8 °C)-98.8 °F (37.1 °C)] 98.2 °F (36.8 °C)  Pulse:  [54-93] 73  Resp:  [11-30] 22  SpO2:  [94 %-99 %] 96 %  BP: (129-170)/(58-79) 129/58  Arterial Line BP: (110-153)/(55-84) 129/58     Date 04/20/19 0700 - 04/21/19 0659   Shift [id number] 2003-5081 7461-9897 24 Hour Total   INTAKE   I.V.(mL/kg) 151.3(2)   151.3(2)   Shift Total(mL/kg) 151.3(2)   151.3(2)   OUTPUT   Shift Total(mL/kg)       Weight (kg) 77 77 77 77       Neurosurgery Physical Exam    E4V5M6  Face symm  PERRLA  TM, CN 2-12 grossly normal  MACK, FCX4  Groin soft           Significant Labs:  Recent Labs   Lab 04/18/19  1426 04/19/19  0322 04/20/19 0223   GLU  --  114* 108   NA  --  137 140   K 3.4* 4.2 4.0   CL  --  110 110   CO2  --  19* 21*   BUN  --  9 11   CREATININE  --  0.7 0.7   CALCIUM  --  8.4* 8.4*   MG  --  2.0 1.8     Recent Labs   Lab 04/19/19 0322 04/20/19 0223   WBC 7.64 9.57   HGB 12.5 11.9*   HCT 37.8 36.0*     426

## 2019-04-20 NOTE — PROGRESS NOTES
[facility]  Neurocritical Care  Progress Note    Admit Date: 2019  Service Date: 2019  Length of Stay: 4    Subjective:     Chief Complaint: SAH (subarachnoid hemorrhage)    History of Present Illness: The patient is a 63-year-old female with PMH of HTN, HLD, Thyroid Disease, that [facility] for management of intracranial aneurysm.  Patient presented to [facility] ED with complaints of sudden onset severe headache with neck stiffness that began at 8am today. She acknowledges, headache, visual disturbances, nausea without vomiting, but denies numbness and tingling.  CT head and CTA brain were obtained. 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  There was no evidence of acute intracranial hemorrhage.   She  endorses having taken IBU but with no relief. Denies anticoagulant use. Of note,  patient's father  or intracerebral aneurysm at an early age.        Hospital Course:  Admit to NCC, Keppra, Nimitop, CTH, MRI   s/p Angio, ECHO, Permissive HTN   Solumedrol for HA, Start heparin, Start Diet   Boost added to diet, Solumedrol scheduled, Myralax, ABG    PBD 4 . No significant events over night. TCDs  With no vasospasms. Restarted home meds.    Interval History: no significant events over night. TCD negative for vasospasms. Restarted home meds    Review of Systems: + H/A  Constitutional: Denies fevers or chills.  Pulmonary: Denies shortness of breath or cough.  Cardiology: Denies chest pain or palpitations.  GI: Denies abdominal pain or constipation.  Neurologic: Denies new weakness,  headache, or paresthesias.  Vitals:   Temp: 98.2 °F (36.8 °C)  Pulse: 61  Rhythm: normal sinus rhythm  BP: 138/72  Resp: (!) 23  SpO2: 96 %  O2 Device (Oxygen Therapy): room air    Temp  Min: 98.2 °F (36.8 °C)  Max: 98.8 °F (37.1 °C)  Pulse  Min: 54  Max: 84  BP  Min: 109/67  Max: 170/79  MAP (mmHg)  Min: 103  Max:  113  Resp  Min: 14  Max: 31  SpO2  Min: 93 %  Max: 100 %    04/19 0701 - 04/20 0700  In: 3405 [P.O.:600; I.V.:1805]  Out: 2700 [Urine:2700]   Unmeasured Output  Urine Occurrence: 1  Stool Occurrence: 1  Pad Count: 1     Examination:   Constitutional: Well-nourished and -developed. No apparent distress.   Eyes: Conjunctiva clear, anicteric. Lids no lesions.  Head/Ears/Nose/Mouth/Throat/Neck: Moist mucous membranes. External ears, nose atraumatic.   Cardiovascular: Regular rhythm. No murmurs. No leg edema.  Respiratory: Comfortable respirations. Clear to auscultation.  Gastrointestinal: No hernia. Soft, nondistended, nontender. + bowel sounds.    Neurologic:  -GCS E4V5M6  -Alert. Oriented to person, place, and time. Speech fluent. Follows commands.  -Cranial nerves II-XII intact,PERRL 3+ EOMI. + cough, gag  -Motor 5/5 all extremities MACK spontaneously  -Sensation bilat intact      Medications:   Continuous  sodium chloride 0.9% Last Rate: 75 mL/hr at 04/20/19 1605   Scheduled  atorvastatin 10 mg Daily   heparin (porcine) 5,000 Units Q8H   levETIRAcetam 500 mg BID   levothyroxine 100 mcg Before breakfast   niMODipine 60 mg Q4H   pantoprazole 40 mg Daily   polyethylene glycol 17 g Daily   senna-docusate 8.6-50 mg 1 tablet BID   PRN  acetaminophen 650 mg Q6H PRN   hydrALAZINE 10 mg Q4H PRN   magnesium oxide 800 mg PRN   magnesium oxide 800 mg PRN   methylPREDNISolone sodium succinate 60 mg Q8H PRN   ondansetron 4 mg Q6H PRN   oxyCODONE 5 mg Q6H PRN   potassium, sodium phosphates 2 packet PRN   potassium, sodium phosphates 2 packet PRN   potassium, sodium phosphates 2 packet PRN   sodium chloride 0.9% 10 mL PRN   sodium chloride 0.9% 10 mL PRN      Today I independently reviewed pertinent medications, lines/drains/airways, imaging, laboratory results, microbiology results, notably:     ISTAT: No results for input(s): PH, PCO2, PO2, POCSATURATED, HCO3, BE, POCNA, POCK, POCTCO2, POCGLU, POCICA, POCLAC, SAMPLE in the last 24  hours.   Chem: Recent Labs   Lab 04/20/19 0223      K 4.0      CO2 21*      BUN 11   CREATININE 0.7   ESTGFRAFRICA >60.0   EGFRNONAA >60.0   CALCIUM 8.4*   MG 1.8   PHOS 3.7  3.7   ANIONGAP 9   PROT 6.0   ALBUMIN 3.0*   BILITOT 0.5   ALKPHOS 62   AST 15   ALT 15     Heme: Recent Labs   Lab 04/20/19 0223   WBC 9.57   HGB 11.9*   HCT 36.0*        Endo: No results for input(s): POCTGLUCOSE in the last 24 hours.   Assessment/Plan:     Neuro  * SAH (subarachnoid hemorrhage)  - Neurological checks q1h  - Seizure, fall, aspiration precautions  - Head of bed at 30 degrees at all times  Permissive HTN Post Secure Coilingon 4/17  - TCD  daily  - Meds:  Nimodipine 60mg PO q4h  APAP 650 mg PO q6h PRN for pain or headache  Methylprednisolone 60mg q8 prn H/A  Levetiracetam 5000mg bid (for seizure prophylaxis)      Vasogenic cerebral edema  As seen on Imaging  Q1 hr Neuro Monitoring  Stat CT for changes in LOC      Headache  Tylenol PRN  Oxycodone PRN  Methylprednisolone 60mg q8h prn, for HA and neck pain/stiffness    Cardiac/Vascular  Hyperlipidemia, mixed  Restarted atorvastatin 10mg daily    Essential hypertension  Cardiac Monitoring  VS Q1 Hour  Permissive HTN post coil  Prn hydralazine   Echo; EF 60% mild TR; Pulm. htn    Renal/  Compensated respiratory alkalosis  On RA O2 sats 96%  PRN ABG  Monitor Respiratory Response, currently No signs of distress    Endocrine  Thyroid disease  History on Admit  TSH .684  Free T4 1.32  Restarted levothyroxine 100mcg daily    GI  GERD without esophagitis  Restarted protonix 40mg daily    Other  S/P coil embolization of cerebral aneurysm  S/P Coil of ICA Aneurysm PCD3/PBD 4  Permissive HTN < 200  Continue Nimitop and Daily TCDs  ECHO complete/ EF 60%  Continue IVF NS 75cc/h  Bolus 500cc to match urine output  Tolerating Diet            The patient is being Prophylaxed for:  Venous Thromboembolism with: Mechanical  Stress Ulcer with: PPI  Ventilator Pneumonia  with: none    Activity Orders          Diet Adult Regular (IDDSI Level 7) [facility]; Thin: Regular starting at 04/18 0707        Full Code     I have spent 35 min with this patient, with over 50% of this time spent coordinating care and speaking with the family    Dagmar Cordova NP  Neurocritical Care  [facility]

## 2019-04-21 LAB
ALBUMIN SERPL BCP-MCNC: 3.2 G/DL (ref 3.5–5.2)
ALP SERPL-CCNC: 59 U/L (ref 55–135)
ALT SERPL W/O P-5'-P-CCNC: 18 U/L (ref 10–44)
ANION GAP SERPL CALC-SCNC: 10 MMOL/L (ref 8–16)
AST SERPL-CCNC: 17 U/L (ref 10–40)
BASOPHILS # BLD AUTO: 0.01 K/UL (ref 0–0.2)
BASOPHILS NFR BLD: 0.1 % (ref 0–1.9)
BILIRUB SERPL-MCNC: 0.4 MG/DL (ref 0.1–1)
BUN SERPL-MCNC: 13 MG/DL (ref 8–23)
CALCIUM SERPL-MCNC: 8.4 MG/DL (ref 8.7–10.5)
CHLORIDE SERPL-SCNC: 110 MMOL/L (ref 95–110)
CO2 SERPL-SCNC: 19 MMOL/L (ref 23–29)
CREAT SERPL-MCNC: 0.8 MG/DL (ref 0.5–1.4)
DIFFERENTIAL METHOD: ABNORMAL
EOSINOPHIL # BLD AUTO: 0 K/UL (ref 0–0.5)
EOSINOPHIL NFR BLD: 0 % (ref 0–8)
ERYTHROCYTE [DISTWIDTH] IN BLOOD BY AUTOMATED COUNT: 12.5 % (ref 11.5–14.5)
EST. GFR  (AFRICAN AMERICAN): >60 ML/MIN/1.73 M^2
EST. GFR  (NON AFRICAN AMERICAN): >60 ML/MIN/1.73 M^2
GLUCOSE SERPL-MCNC: 132 MG/DL (ref 70–110)
HCT VFR BLD AUTO: 36.1 % (ref 37–48.5)
HGB BLD-MCNC: 12 G/DL (ref 12–16)
IMM GRANULOCYTES # BLD AUTO: 0.09 K/UL (ref 0–0.04)
IMM GRANULOCYTES NFR BLD AUTO: 1.1 % (ref 0–0.5)
LYMPHOCYTES # BLD AUTO: 1 K/UL (ref 1–4.8)
LYMPHOCYTES NFR BLD: 12.2 % (ref 18–48)
MAGNESIUM SERPL-MCNC: 1.9 MG/DL (ref 1.6–2.6)
MCH RBC QN AUTO: 29.6 PG (ref 27–31)
MCHC RBC AUTO-ENTMCNC: 33.2 G/DL (ref 32–36)
MCV RBC AUTO: 89 FL (ref 82–98)
MONOCYTES # BLD AUTO: 0.2 K/UL (ref 0.3–1)
MONOCYTES NFR BLD: 2.3 % (ref 4–15)
NEUTROPHILS # BLD AUTO: 6.7 K/UL (ref 1.8–7.7)
NEUTROPHILS NFR BLD: 84.3 % (ref 38–73)
NRBC BLD-RTO: 0 /100 WBC
PHOSPHATE SERPL-MCNC: 4 MG/DL (ref 2.7–4.5)
PLATELET # BLD AUTO: 258 K/UL (ref 150–350)
PMV BLD AUTO: 10.3 FL (ref 9.2–12.9)
POTASSIUM SERPL-SCNC: 4.2 MMOL/L (ref 3.5–5.1)
PROT SERPL-MCNC: 6.3 G/DL (ref 6–8.4)
RBC # BLD AUTO: 4.06 M/UL (ref 4–5.4)
SODIUM SERPL-SCNC: 139 MMOL/L (ref 136–145)
WBC # BLD AUTO: 7.97 K/UL (ref 3.9–12.7)

## 2019-04-21 PROCEDURE — 94761 N-INVAS EAR/PLS OXIMETRY MLT: CPT

## 2019-04-21 PROCEDURE — 99233 SBSQ HOSP IP/OBS HIGH 50: CPT | Mod: ,,, | Performed by: NEUROLOGICAL SURGERY

## 2019-04-21 PROCEDURE — 63600175 PHARM REV CODE 636 W HCPCS: Performed by: NURSE PRACTITIONER

## 2019-04-21 PROCEDURE — 84100 ASSAY OF PHOSPHORUS: CPT

## 2019-04-21 PROCEDURE — 25000003 PHARM REV CODE 250: Performed by: PSYCHIATRY & NEUROLOGY

## 2019-04-21 PROCEDURE — 80053 COMPREHEN METABOLIC PANEL: CPT

## 2019-04-21 PROCEDURE — 85025 COMPLETE CBC W/AUTO DIFF WBC: CPT

## 2019-04-21 PROCEDURE — 25000003 PHARM REV CODE 250: Performed by: NURSE PRACTITIONER

## 2019-04-21 PROCEDURE — 20000000 HC ICU ROOM

## 2019-04-21 PROCEDURE — 25000003 PHARM REV CODE 250: Performed by: STUDENT IN AN ORGANIZED HEALTH CARE EDUCATION/TRAINING PROGRAM

## 2019-04-21 PROCEDURE — 99233 SBSQ HOSP IP/OBS HIGH 50: CPT | Mod: ,,, | Performed by: NURSE PRACTITIONER

## 2019-04-21 PROCEDURE — 99233 PR SUBSEQUENT HOSPITAL CARE,LEVL III: ICD-10-PCS | Mod: ,,, | Performed by: NEUROLOGICAL SURGERY

## 2019-04-21 PROCEDURE — 83735 ASSAY OF MAGNESIUM: CPT

## 2019-04-21 PROCEDURE — 99233 PR SUBSEQUENT HOSPITAL CARE,LEVL III: ICD-10-PCS | Mod: ,,, | Performed by: NURSE PRACTITIONER

## 2019-04-21 RX ADMIN — LEVETIRACETAM 500 MG: 500 TABLET ORAL at 09:04

## 2019-04-21 RX ADMIN — ACETAMINOPHEN 650 MG: 325 TABLET ORAL at 06:04

## 2019-04-21 RX ADMIN — HEPARIN SODIUM 5000 UNITS: 5000 INJECTION, SOLUTION INTRAVENOUS; SUBCUTANEOUS at 02:04

## 2019-04-21 RX ADMIN — LEVOTHYROXINE SODIUM 100 MCG: 50 TABLET ORAL at 06:04

## 2019-04-21 RX ADMIN — POLYETHYLENE GLYCOL 3350 17 G: 17 POWDER, FOR SOLUTION ORAL at 09:04

## 2019-04-21 RX ADMIN — METHYLPREDNISOLONE SODIUM SUCCINATE 60 MG: 125 INJECTION, POWDER, FOR SOLUTION INTRAMUSCULAR; INTRAVENOUS at 07:04

## 2019-04-21 RX ADMIN — FENTANYL CITRATE 12.5 MCG: 50 INJECTION INTRAMUSCULAR; INTRAVENOUS at 02:04

## 2019-04-21 RX ADMIN — HEPARIN SODIUM 5000 UNITS: 5000 INJECTION, SOLUTION INTRAVENOUS; SUBCUTANEOUS at 06:04

## 2019-04-21 RX ADMIN — STANDARDIZED SENNA CONCENTRATE AND DOCUSATE SODIUM 1 TABLET: 8.6; 5 TABLET, FILM COATED ORAL at 09:04

## 2019-04-21 RX ADMIN — NIMODIPINE 60 MG: 30 CAPSULE, LIQUID FILLED ORAL at 09:04

## 2019-04-21 RX ADMIN — ATORVASTATIN CALCIUM 10 MG: 10 TABLET, FILM COATED ORAL at 09:04

## 2019-04-21 RX ADMIN — NIMODIPINE 60 MG: 30 CAPSULE, LIQUID FILLED ORAL at 06:04

## 2019-04-21 RX ADMIN — PANTOPRAZOLE SODIUM 40 MG: 40 TABLET, DELAYED RELEASE ORAL at 09:04

## 2019-04-21 RX ADMIN — NIMODIPINE 60 MG: 30 CAPSULE, LIQUID FILLED ORAL at 02:04

## 2019-04-21 RX ADMIN — HEPARIN SODIUM 5000 UNITS: 5000 INJECTION, SOLUTION INTRAVENOUS; SUBCUTANEOUS at 09:04

## 2019-04-21 RX ADMIN — ACETAMINOPHEN 650 MG: 325 TABLET ORAL at 09:04

## 2019-04-21 RX ADMIN — NIMODIPINE 60 MG: 30 CAPSULE, LIQUID FILLED ORAL at 05:04

## 2019-04-21 NOTE — SUBJECTIVE & OBJECTIVE
Interval History:    NAEON. No issues per nursing. HA improving. TCD without sonographic evidence for vasospasm. Intact on exam.     Medications:  Continuous Infusions:   sodium chloride 0.9% 75 mL/hr at 04/21/19 0705     Scheduled Meds:   atorvastatin  10 mg Oral Daily    heparin (porcine)  5,000 Units Subcutaneous Q8H    levETIRAcetam  500 mg Oral BID    levothyroxine  100 mcg Oral Before breakfast    niMODipine  60 mg Oral Q4H    pantoprazole  40 mg Oral Daily    polyethylene glycol  17 g Oral Daily    senna-docusate 8.6-50 mg  1 tablet Oral BID     PRN Meds:acetaminophen, fentaNYL, hydrALAZINE, magnesium oxide, magnesium oxide, methylPREDNISolone sodium succinate, ondansetron, oxyCODONE, potassium, sodium phosphates, potassium, sodium phosphates, potassium, sodium phosphates, sodium chloride 0.9%, sodium chloride 0.9%     Review of Systems    Objective:     Weight: 77.2 kg (170 lb 3.1 oz)  Body mass index is 26.66 kg/m².  Vital Signs (Most Recent):  Temp: 98.5 °F (36.9 °C) (04/21/19 0300)  Pulse: 68 (04/21/19 0705)  Resp: (!) 24 (04/21/19 0705)  BP: 132/78 (04/20/19 1805)  SpO2: (!) 94 % (04/21/19 0705) Vital Signs (24h Range):  Temp:  [98.3 °F (36.8 °C)-98.5 °F (36.9 °C)] 98.5 °F (36.9 °C)  Pulse:  [53-90] 68  Resp:  [15-53] 24  SpO2:  [93 %-100 %] 94 %  BP: (109-144)/(67-78) 132/78  Arterial Line BP: ()/(54-85) 118/54     Date 04/21/19 0700 - 04/22/19 0659   Shift 5796-3303 0071-4130 8575-3350 24 Hour Total   INTAKE   I.V.(mL/kg) 81.3(1.1)   81.3(1.1)   Shift Total(mL/kg) 81.3(1.1)   81.3(1.1)   OUTPUT   Shift Total(mL/kg)       Weight (kg) 77.2 77.2 77.2 77.2       Neurosurgery Physical Exam    E4V5M6  Face symm  PERRLA  TM, CN 2-12 grossly normal  MACK, FCX4  Groin soft       Significant Labs:  Recent Labs   Lab 04/20/19  0223 04/21/19  0244    132*    139   K 4.0 4.2    110   CO2 21* 19*   BUN 11 13   CREATININE 0.7 0.8   CALCIUM 8.4* 8.4*   MG 1.8 1.9     Recent Labs   Lab  04/20/19  0223 04/21/19  0244   WBC 9.57 7.97   HGB 11.9* 12.0   HCT 36.0* 36.1*    258

## 2019-04-21 NOTE — PROGRESS NOTES
[facility]  Neurocritical Care  Progress Note    Admit Date: 2019  Service Date: 2019  Length of Stay: 5    Subjective:     Chief Complaint: SAH (subarachnoid hemorrhage)    History of Present Illness: The patient is a 63-year-old female with PMH of HTN, HLD, Thyroid Disease, that presents as a transfer from [facility] ([facility]) for management of intracranial aneurysm.  Patient presented to [facility] ED with complaints of sudden onset severe headache with neck stiffness that began at 8am today. She acknowledges, headache, visual disturbances, nausea without vomiting, but denies numbness and tingling.  CT head and CTA brain were obtained. 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  There was no evidence of acute intracranial hemorrhage.   She  endorses having taken IBU but with no relief. Denies anticoagulant use. Of note,  patient's father  or intracerebral aneurysm at an early age.        Hospital Course:  Admit to NCC, Keppra, Nimitop, CTH, MRI   s/p Angio, ECHO, Permissive HTN   Solumedrol for HA, Start heparin, Start Diet   Boost added to diet, Solumedrol scheduled, Myralax, ABG    PBD 4 . No significant events over night. TCDs  With no vasospasms. Restarted home meds.   TCDs pending. No H/A today. wean keppra. EEG negative     Interval History: No significant events over night. TCDs pending. No H/A today. wean keppra. EEG negative     Review of Systems:   Constitutional: Denies fevers or chills.  Pulmonary: Denies shortness of breath or cough.  Cardiology: Denies chest pain or palpitations.  GI: Denies abdominal pain or constipation.  Neurologic: Denies new weakness,  headache, or paresthesias.  Vitals:   Temp: 97.9 °F (36.6 °C)  Pulse: (!) 57  Rhythm: normal sinus rhythm  BP: (!) 140/72  Resp: (!) 23  SpO2: 97 %  O2 Device (Oxygen Therapy): room air    Temp  Min: 97.9 °F (36.6 °C)  Max: 98.5 °F (36.9 °C)  Pulse   Min: 51  Max: 93  BP  Min: 127/67  Max: 161/78  Resp  Min: 15  Max: 53  SpO2  Min: 93 %  Max: 100 %    04/20 0701 - 04/21 0700  In: 2595 [P.O.:800; I.V.:1795]  Out: 2750 [Urine:2750]   Unmeasured Output  Urine Occurrence: 1  Stool Occurrence: 1  Pad Count: 1     Examination:   Constitutional: Well-nourished and -developed. No apparent distress.   Eyes: Conjunctiva clear, anicteric. Lids no lesions.  Head/Ears/Nose/Mouth/Throat/Neck: Moist mucous membranes. External ears, nose atraumatic.   Cardiovascular: Regular rhythm. No murmurs. No leg edema.  Respiratory: Comfortable respirations. Clear to auscultation.  Gastrointestinal: No hernia. Soft, nondistended, nontender. + bowel sounds.     Neurologic:  -GCS E4V5M6  -Alert. Oriented to person, place, and time. Speech fluent. Follows commands.  -Cranial nerves II-XII intact,PERRL 3+ EOMI. + cough, gag  -Motor 5/5 all extremities MACK spontaneously  -Sensation bilat intact      Medications:   Continuous  sodium chloride 0.9% Last Rate: 75 mL/hr at 04/21/19 1605   Scheduled  atorvastatin 10 mg Daily   heparin (porcine) 5,000 Units Q8H   levETIRAcetam 500 mg BID   levothyroxine 100 mcg Before breakfast   niMODipine 60 mg Q4H   pantoprazole 40 mg Daily   polyethylene glycol 17 g Daily   senna-docusate 8.6-50 mg 1 tablet BID   PRN  acetaminophen 650 mg Q6H PRN   fentaNYL 12.5 mcg Daily PRN   hydrALAZINE 10 mg Q4H PRN   magnesium oxide 800 mg PRN   magnesium oxide 800 mg PRN   methylPREDNISolone sodium succinate 60 mg Q8H PRN   ondansetron 4 mg Q6H PRN   oxyCODONE 5 mg Q6H PRN   potassium, sodium phosphates 2 packet PRN   potassium, sodium phosphates 2 packet PRN   potassium, sodium phosphates 2 packet PRN   sodium chloride 0.9% 10 mL PRN   sodium chloride 0.9% 10 mL PRN      Today I independently reviewed pertinent medications, lines/drains/airways, imaging, laboratory results, microbiology results, notably:     ISTAT: No results for input(s): PH, PCO2, PO2, POCSATURATED,  HCO3, BE, POCNA, POCK, POCTCO2, POCGLU, POCICA, POCLAC, SAMPLE in the last 24 hours.   Chem: Recent Labs   Lab 04/21/19  0244      K 4.2      CO2 19*   *   BUN 13   CREATININE 0.8   ESTGFRAFRICA >60.0   EGFRNONAA >60.0   CALCIUM 8.4*   MG 1.9   PHOS 4.0   ANIONGAP 10   PROT 6.3   ALBUMIN 3.2*   BILITOT 0.4   ALKPHOS 59   AST 17   ALT 18     Heme: Recent Labs   Lab 04/21/19  0244   WBC 7.97   HGB 12.0   HCT 36.1*        Endo: No results for input(s): POCTGLUCOSE in the last 24 hours.   Assessment/Plan:     Neuro  * SAH (subarachnoid hemorrhage)  SAH PBD5/PCD4  - Neurological checks q1h  - Seizure, fall, aspiration precautions  - Head of bed at 30 degrees at all times  Permissive HTN Post Secure Coilingon 4/17  - TCD  Daily 4/20 and 4/21 pending  - Meds:  Nimodipine 60mg PO q4h  APAP 650 mg PO q6h PRN for pain or headache  Methylprednisolone 60mg q8 prn H/A  Levetiracetam 5000mg bid (for seizure prophylaxis)      Vasogenic cerebral edema  As seen on Imaging  Q1 hr Neuro Monitoring  Stat CT for changes in LOC  Monitor neuro exam      Headache  Tylenol PRN  Oxycodone PRN  Methylprednisolone 60mg q8h prn, for HA and neck pain/stiffness    Cardiac/Vascular  Hyperlipidemia, mixed  Restarted atorvastatin 10mg daily    Essential hypertension  Cardiac Monitoring  VS Q1 Hour  Permissive HTN post coil  Prn hydralazine   Echo; EF 60% mild TR; Pulm. htn    Renal/  Compensated respiratory alkalosis  On RA O2 sats 96%  PRN ABG  Monitor Respiratory Response, currently No signs of distress    Endocrine  Thyroid disease  History of  on Admit  TSH .684  Free T4 1.32  Restarted levothyroxine 100mcg daily    GI  GERD without esophagitis  Restarted protonix 40mg daily    Other  S/P coil embolization of cerebral aneurysm  S/P Coil of ICA Aneurysm PCD4/PBD 5  Permissive HTN < 200  Continue Nimitop and Daily TCDs  ECHO complete/ EF 60%  Continue IVF NS 75cc/h  4/20 Bolused 500cc to match urine output  Tolerating  Diet            The patient is being Prophylaxed for:  Venous Thromboembolism with: Mechanical  Stress Ulcer with: PPI  Ventilator Pneumonia with: not applicable    Activity Orders          Diet Adult Regular (IDDSI Level 7) [facility]; Thin: Regular starting at 04/18 0707        Full Code     I have spent 35 min with this patient, with over 50% of this time spent coordinating care and speaking with the family    Dagmar Cordova NP  Neurocritical Care  [facility]

## 2019-04-21 NOTE — ASSESSMENT & PLAN NOTE
SAH PBD5/PCD4  - Neurological checks q1h  - Seizure, fall, aspiration precautions  - Head of bed at 30 degrees at all times  Permissive HTN Post Secure Coilingon 4/17  - TCD  Daily 4/20 and 4/21 pending  - Meds:  Nimodipine 60mg PO q4h  APAP 650 mg PO q6h PRN for pain or headache  Methylprednisolone 60mg q8 prn H/A  Levetiracetam 5000mg bid (for seizure prophylaxis)

## 2019-04-21 NOTE — ASSESSMENT & PLAN NOTE
63 year old female with a PMHx HTN, hypothyroidism presents as a transfer with a HH2 F4 SAH due to left hypophyseal saccular aneurysm, now s/p coil embolization on 4/17.    -Patient neurologically stable on exam  -Maintain head of bed > 30 degrees at all times  -Continue Keppra 500 BID x 7d for seizure prevention  -Continue TCDs daily x 14 days to monitor for vasospasm  -Continue Nimotop 60 mg q 4 hours x 21d for vasospasm prevention  -Continue permissive hypertension. Maintain SBP <200  -Maintain Na >135. Currently Na is 139.   -Strict I/O, net even to +500  -Daily PT/OT  -Notify NSGY immediately if any neurostatus change  -Will continue to follow

## 2019-04-21 NOTE — ASSESSMENT & PLAN NOTE
S/P Coil of ICA Aneurysm PCD4/PBD 5  Permissive HTN < 200  Continue Nimitop and Daily TCDs  ECHO complete/ EF 60%  Continue IVF NS 75cc/h  4/20 Bolused 500cc to match urine output  Tolerating Diet

## 2019-04-21 NOTE — PROGRESS NOTES
[facility]  Neurosurgery  Progress Note    Subjective:     History of Present Illness: 63 year old female with PMHx HTN, hypothyroidism presents as a transfer from [facility] for evaluation of left ICA saccular aneurysm. Patient's family at bedside providing majority of history. Patient reports severe posterior headache, neck pain, photophobia starting at 930am. Subsequently brought to the ED by her family. Denies weakness, numbness, paresthesias, seizure activity, LOC, falls/trauma. Family at bedside state patient's father  in his 30s of intracerebral aneurysmal rupture. Transferred to [facility] for higher level of care. Denies use of anticoagulants or antiplatelet therapy. Reports compliance with BP medication however family states SBP is usually ~150s at home. Review of coags at OSH all within normal limits: aPTT 28, INR 0.9, PT 12.7.    Post-Op Info:  Procedure(s) (LRB):  ANGIOGRAM-CEREBRAL (Bilateral)   4 Days Post-Op     Interval History:    NAEON. No issues per nursing. HA improving. TCD without sonographic evidence for vasospasm. Intact on exam.     Medications:  Continuous Infusions:   sodium chloride 0.9% 75 mL/hr at 19 0705     Scheduled Meds:   atorvastatin  10 mg Oral Daily    heparin (porcine)  5,000 Units Subcutaneous Q8H    levETIRAcetam  500 mg Oral BID    levothyroxine  100 mcg Oral Before breakfast    niMODipine  60 mg Oral Q4H    pantoprazole  40 mg Oral Daily    polyethylene glycol  17 g Oral Daily    senna-docusate 8.6-50 mg  1 tablet Oral BID     PRN Meds:acetaminophen, fentaNYL, hydrALAZINE, magnesium oxide, magnesium oxide, methylPREDNISolone sodium succinate, ondansetron, oxyCODONE, potassium, sodium phosphates, potassium, sodium phosphates, potassium, sodium phosphates, sodium chloride 0.9%, sodium chloride 0.9%     Review of Systems    Objective:     Weight: 77.2 kg (170 lb 3.1 oz)  Body mass index is 26.66 kg/m².  Vital Signs (Most  Recent):  Temp: 98.5 °F (36.9 °C) (04/21/19 0300)  Pulse: 68 (04/21/19 0705)  Resp: (!) 24 (04/21/19 0705)  BP: 132/78 (04/20/19 1805)  SpO2: (!) 94 % (04/21/19 0705) Vital Signs (24h Range):  Temp:  [98.3 °F (36.8 °C)-98.5 °F (36.9 °C)] 98.5 °F (36.9 °C)  Pulse:  [53-90] 68  Resp:  [15-53] 24  SpO2:  [93 %-100 %] 94 %  BP: (109-144)/(67-78) 132/78  Arterial Line BP: ()/(54-85) 118/54     Date 04/21/19 0700 - 04/22/19 0659   Shift 7548-1877 7036-0928 3225-4487 24 Hour Total   INTAKE   I.V.(mL/kg) 81.3(1.1)   81.3(1.1)   Shift Total(mL/kg) 81.3(1.1)   81.3(1.1)   OUTPUT   Shift Total(mL/kg)       Weight (kg) 77.2 77.2 77.2 77.2       Neurosurgery Physical Exam    E4V5M6  Face symm  PERRLA  TM, CN 2-12 grossly normal  MACK, FCX4  Groin soft       Significant Labs:  Recent Labs   Lab 04/20/19  0223 04/21/19  0244    132*    139   K 4.0 4.2    110   CO2 21* 19*   BUN 11 13   CREATININE 0.7 0.8   CALCIUM 8.4* 8.4*   MG 1.8 1.9     Recent Labs   Lab 04/20/19  0223 04/21/19  0244   WBC 9.57 7.97   HGB 11.9* 12.0   HCT 36.0* 36.1*    258         Assessment/Plan:     * SAH (subarachnoid hemorrhage)  63 year old female with a PMHx HTN, hypothyroidism presents as a transfer with a HH2 F4 SAH due to left hypophyseal saccular aneurysm, now s/p coil embolization on 4/17.    -Patient neurologically stable on exam  -Maintain head of bed > 30 degrees at all times  -Continue Keppra 500 BID x 7d for seizure prevention  -Continue TCDs daily x 14 days to monitor for vasospasm  -Continue Nimotop 60 mg q 4 hours x 21d for vasospasm prevention  -Continue permissive hypertension. Maintain SBP <200  -Maintain Na >135. Currently Na is 139.   -Strict I/O, net even to +500  -Daily PT/OT  -Notify NSGY immediately if any neurostatus change  -Will continue to follow  -Okay for stepdown to the stepdown unit under NSGY service ([address] tow)         Uzma Celaya MD  Neurosurgery  [facility]  [place]

## 2019-04-22 LAB
ALBUMIN SERPL BCP-MCNC: 2.9 G/DL (ref 3.5–5.2)
ALP SERPL-CCNC: 53 U/L (ref 55–135)
ALT SERPL W/O P-5'-P-CCNC: 28 U/L (ref 10–44)
ANION GAP SERPL CALC-SCNC: 8 MMOL/L (ref 8–16)
AST SERPL-CCNC: 31 U/L (ref 10–40)
BASOPHILS # BLD AUTO: 0.02 K/UL (ref 0–0.2)
BASOPHILS NFR BLD: 0.2 % (ref 0–1.9)
BILIRUB SERPL-MCNC: 0.4 MG/DL (ref 0.1–1)
BUN SERPL-MCNC: 12 MG/DL (ref 8–23)
CALCIUM SERPL-MCNC: 8.1 MG/DL (ref 8.7–10.5)
CHLORIDE SERPL-SCNC: 110 MMOL/L (ref 95–110)
CO2 SERPL-SCNC: 21 MMOL/L (ref 23–29)
CREAT SERPL-MCNC: 0.8 MG/DL (ref 0.5–1.4)
DIFFERENTIAL METHOD: ABNORMAL
EOSINOPHIL # BLD AUTO: 0 K/UL (ref 0–0.5)
EOSINOPHIL NFR BLD: 0.1 % (ref 0–8)
ERYTHROCYTE [DISTWIDTH] IN BLOOD BY AUTOMATED COUNT: 12.7 % (ref 11.5–14.5)
EST. GFR  (AFRICAN AMERICAN): >60 ML/MIN/1.73 M^2
EST. GFR  (NON AFRICAN AMERICAN): >60 ML/MIN/1.73 M^2
GLUCOSE SERPL-MCNC: 101 MG/DL (ref 70–110)
HCT VFR BLD AUTO: 32.5 % (ref 37–48.5)
HGB BLD-MCNC: 10.9 G/DL (ref 12–16)
IMM GRANULOCYTES # BLD AUTO: 0.09 K/UL (ref 0–0.04)
IMM GRANULOCYTES NFR BLD AUTO: 0.9 % (ref 0–0.5)
LYMPHOCYTES # BLD AUTO: 2.4 K/UL (ref 1–4.8)
LYMPHOCYTES NFR BLD: 25.7 % (ref 18–48)
MAGNESIUM SERPL-MCNC: 1.9 MG/DL (ref 1.6–2.6)
MCH RBC QN AUTO: 29.6 PG (ref 27–31)
MCHC RBC AUTO-ENTMCNC: 33.5 G/DL (ref 32–36)
MCV RBC AUTO: 88 FL (ref 82–98)
MONOCYTES # BLD AUTO: 0.8 K/UL (ref 0.3–1)
MONOCYTES NFR BLD: 7.9 % (ref 4–15)
NEUTROPHILS # BLD AUTO: 6.2 K/UL (ref 1.8–7.7)
NEUTROPHILS NFR BLD: 65.2 % (ref 38–73)
NRBC BLD-RTO: 0 /100 WBC
PHOSPHATE SERPL-MCNC: 3.4 MG/DL (ref 2.7–4.5)
PLATELET # BLD AUTO: 251 K/UL (ref 150–350)
PMV BLD AUTO: 10 FL (ref 9.2–12.9)
POTASSIUM SERPL-SCNC: 3.8 MMOL/L (ref 3.5–5.1)
PROT SERPL-MCNC: 5.5 G/DL (ref 6–8.4)
RBC # BLD AUTO: 3.68 M/UL (ref 4–5.4)
SODIUM SERPL-SCNC: 139 MMOL/L (ref 136–145)
WBC # BLD AUTO: 9.5 K/UL (ref 3.9–12.7)

## 2019-04-22 PROCEDURE — 99233 PR SUBSEQUENT HOSPITAL CARE,LEVL III: ICD-10-PCS | Mod: ,,, | Performed by: NURSE PRACTITIONER

## 2019-04-22 PROCEDURE — 80053 COMPREHEN METABOLIC PANEL: CPT

## 2019-04-22 PROCEDURE — 63600175 PHARM REV CODE 636 W HCPCS: Performed by: NURSE PRACTITIONER

## 2019-04-22 PROCEDURE — 83735 ASSAY OF MAGNESIUM: CPT

## 2019-04-22 PROCEDURE — 20000000 HC ICU ROOM

## 2019-04-22 PROCEDURE — 25000003 PHARM REV CODE 250: Performed by: NURSE PRACTITIONER

## 2019-04-22 PROCEDURE — 85025 COMPLETE CBC W/AUTO DIFF WBC: CPT

## 2019-04-22 PROCEDURE — 25000003 PHARM REV CODE 250: Performed by: PSYCHIATRY & NEUROLOGY

## 2019-04-22 PROCEDURE — 94761 N-INVAS EAR/PLS OXIMETRY MLT: CPT

## 2019-04-22 PROCEDURE — 99233 SBSQ HOSP IP/OBS HIGH 50: CPT | Mod: ,,, | Performed by: NURSE PRACTITIONER

## 2019-04-22 PROCEDURE — 84100 ASSAY OF PHOSPHORUS: CPT

## 2019-04-22 PROCEDURE — 99233 SBSQ HOSP IP/OBS HIGH 50: CPT | Mod: ,,, | Performed by: PSYCHIATRY & NEUROLOGY

## 2019-04-22 PROCEDURE — 99233 PR SUBSEQUENT HOSPITAL CARE,LEVL III: ICD-10-PCS | Mod: ,,, | Performed by: PSYCHIATRY & NEUROLOGY

## 2019-04-22 RX ADMIN — HEPARIN SODIUM 5000 UNITS: 5000 INJECTION, SOLUTION INTRAVENOUS; SUBCUTANEOUS at 06:04

## 2019-04-22 RX ADMIN — ACETAMINOPHEN 650 MG: 325 TABLET ORAL at 09:04

## 2019-04-22 RX ADMIN — ATORVASTATIN CALCIUM 10 MG: 10 TABLET, FILM COATED ORAL at 10:04

## 2019-04-22 RX ADMIN — LEVETIRACETAM 500 MG: 500 TABLET ORAL at 09:04

## 2019-04-22 RX ADMIN — ACETAMINOPHEN 650 MG: 325 TABLET ORAL at 02:04

## 2019-04-22 RX ADMIN — SODIUM CHLORIDE 500 ML: 0.9 INJECTION, SOLUTION INTRAVENOUS at 10:04

## 2019-04-22 RX ADMIN — HEPARIN SODIUM 5000 UNITS: 5000 INJECTION, SOLUTION INTRAVENOUS; SUBCUTANEOUS at 09:04

## 2019-04-22 RX ADMIN — LEVOTHYROXINE SODIUM 100 MCG: 50 TABLET ORAL at 06:04

## 2019-04-22 RX ADMIN — NIMODIPINE 60 MG: 30 CAPSULE, LIQUID FILLED ORAL at 06:04

## 2019-04-22 RX ADMIN — NIMODIPINE 60 MG: 30 CAPSULE, LIQUID FILLED ORAL at 02:04

## 2019-04-22 RX ADMIN — HEPARIN SODIUM 5000 UNITS: 5000 INJECTION, SOLUTION INTRAVENOUS; SUBCUTANEOUS at 02:04

## 2019-04-22 RX ADMIN — NIMODIPINE 60 MG: 30 CAPSULE, LIQUID FILLED ORAL at 09:04

## 2019-04-22 RX ADMIN — ACETAMINOPHEN 650 MG: 325 TABLET ORAL at 06:04

## 2019-04-22 RX ADMIN — PANTOPRAZOLE SODIUM 40 MG: 40 TABLET, DELAYED RELEASE ORAL at 10:04

## 2019-04-22 RX ADMIN — LEVETIRACETAM 500 MG: 500 TABLET ORAL at 10:04

## 2019-04-22 RX ADMIN — METHYLPREDNISOLONE SODIUM SUCCINATE 60 MG: 125 INJECTION, POWDER, FOR SOLUTION INTRAMUSCULAR; INTRAVENOUS at 06:04

## 2019-04-22 RX ADMIN — NIMODIPINE 60 MG: 30 CAPSULE, LIQUID FILLED ORAL at 11:04

## 2019-04-22 RX ADMIN — STANDARDIZED SENNA CONCENTRATE AND DOCUSATE SODIUM 1 TABLET: 8.6; 5 TABLET, FILM COATED ORAL at 09:04

## 2019-04-22 RX ADMIN — METHYLPREDNISOLONE SODIUM SUCCINATE 60 MG: 125 INJECTION, POWDER, FOR SOLUTION INTRAMUSCULAR; INTRAVENOUS at 02:04

## 2019-04-22 NOTE — PROGRESS NOTES
[facility]  Neurosurgery  Progress Note    Subjective:     History of Present Illness: 63 year old female with PMHx HTN, hypothyroidism presents as a transfer from [facility] for evaluation of left ICA saccular aneurysm. Patient's family at bedside providing majority of history. Patient reports severe posterior headache, neck pain, photophobia starting at 930am. Subsequently brought to the ED by her family. Denies weakness, numbness, paresthesias, seizure activity, LOC, falls/trauma. Family at bedside state patient's father  in his 30s of intracerebral aneurysmal rupture. Transferred to [place] for higher level of care. Denies use of anticoagulants or antiplatelet therapy. Reports compliance with BP medication however family states SBP is usually ~150s at home. Review of coags at OSH all within normal limits: aPTT 28, INR 0.9, PT 12.7.    Post-Op Info:  Procedure(s) (LRB):  ANGIOGRAM-CEREBRAL (Bilateral)   5 Days Post-Op     Interval History:    NAEON. No issues per nursing. HA improving. TCD without sonographic evidence for vasospasm. Intact on exam.     Medications:  Continuous Infusions:   sodium chloride 0.9% 75 mL/hr at 19 1200     Scheduled Meds:   atorvastatin  10 mg Oral Daily    heparin (porcine)  5,000 Units Subcutaneous Q8H    levETIRAcetam  500 mg Oral BID    levothyroxine  100 mcg Oral Before breakfast    niMODipine  60 mg Oral Q4H    pantoprazole  40 mg Oral Daily    polyethylene glycol  17 g Oral Daily    senna-docusate 8.6-50 mg  1 tablet Oral BID     PRN Meds:acetaminophen, fentaNYL, hydrALAZINE, magnesium oxide, magnesium oxide, methylPREDNISolone sodium succinate, ondansetron, oxyCODONE, potassium, sodium phosphates, potassium, sodium phosphates, potassium, sodium phosphates, sodium chloride 0.9%, sodium chloride 0.9%     Review of Systems    Objective:     Weight: 77.1 kg (169 lb 15.6 oz)  Body mass index is 26.62 kg/m².  Vital Signs (Most  Recent):  Temp: 98.4 °F (36.9 °C) (04/22/19 1100)  Pulse: 74 (04/22/19 1200)  Resp: (!) 24 (04/22/19 1200)  BP: 137/72 (04/21/19 1805)  SpO2: 97 % (04/22/19 1200) Vital Signs (24h Range):  Temp:  [97.9 °F (36.6 °C)-98.8 °F (37.1 °C)] 98.4 °F (36.9 °C)  Pulse:  [51-74] 74  Resp:  [14-33] 24  SpO2:  [94 %-99 %] 97 %  BP: (128-161)/(67-78) 137/72  Arterial Line BP: ()/(56-86) 144/56     Date 04/22/19 0700 - 04/23/19 0659   Shift 8335-7543 4003-9490 5456-5857 24 Hour Total   INTAKE   I.V.(mL/kg) 1343.8(17.4)   1343.8(17.4)   IV Piggyback 500   500   Shift Total(mL/kg) 1843.8(23.9)   1843.8(23.9)   OUTPUT   Urine(mL/kg/hr) 1100   1100   Shift Total(mL/kg) 1100(14.3)   1100(14.3)   Weight (kg) 77.1 77.1 77.1 77.1       Neurosurgery Physical Exam    E4V5M6  Face symm  PERRLA  TM, CN 2-12 grossly normal  MACK, FCX4  Groin soft       Significant Labs:  Recent Labs   Lab 04/21/19  0244 04/22/19 0219   * 101    139   K 4.2 3.8    110   CO2 19* 21*   BUN 13 12   CREATININE 0.8 0.8   CALCIUM 8.4* 8.1*   MG 1.9 1.9     Recent Labs   Lab 04/21/19  0244 04/22/19 0219   WBC 7.97 9.50   HGB 12.0 10.9*   HCT 36.1* 32.5*    251         Assessment/Plan:     * SAH (subarachnoid hemorrhage)  63 year old female with a PMHx HTN, hypothyroidism presents as a transfer with a HH2 F4 SAH due to left hypophyseal saccular aneurysm, now s/p coil embolization on 4/17.    -Patient neurologically stable on exam  -Maintain head of bed > 30 degrees at all times  -Continue Keppra 500 BID x 7d for seizure prevention  -Continue TCDs daily x 14 days to monitor for vasospasm  -Continue Nimotop 60 mg q 4 hours x 21d for vasospasm prevention  -Continue permissive hypertension. Maintain SBP <200  -Maintain Na >135. Currently Na is 139.   -Strict I/O, net even to +500  -Daily PT/OT  -Notify NSGY immediately if any neurostatus change  -Will continue to follow        Uzma Celaya MD  Neurosurgery  [facility]  [place]

## 2019-04-22 NOTE — PROGRESS NOTES
[facility]  Vascular Neurology  Comprehensive Stroke Center  Progress Note    Assessment/Plan:     * SAH (subarachnoid hemorrhage)  Patient with complaint of sudden onset of HA this AM. Seen at [facility] for severe headache and photophobia. CTA completed which revealed left ICA saccular aneurysm. Patient transferred to [facility] for further neurological evaluation. Admitted to [facility]. NSx consulted.    MRI revealing SAH within interpeduncular fossa and the anterior basal cisterns. Small amount of IVH in the occipital horn of the R lateral ventricle. Patient s/p L ICA embolization w/ coiling completed in IR 4/17.    Continue Nimotop 60q4 x 21 days, and daily TCDs x 14 days, and Keppra 500bid x 7 days    Antithrombotics for secondary stroke prevention:  None due to aneurysm and potential SAH     Statins for secondary stroke prevention and hyperlipidemia, if present:   Continue Atorvastatin 40 mg     Aggressive risk factor modification: family hx of aneurysm, L ICA aneusym      Rehab efforts: PT/OT/SLP to evaluate and treat - no needs at this time     Diagnostics ordered/pending: daily TCDs    VTE prophylaxis: None due to potential SAH; mechanical SCDs     BP parameters: SAH: Secured aneurysm, SBP < 200 per NICU      Hyperlipidemia, mixed  Stroke risk factor        Vasogenic cerebral edema  Area of cytotoxic cerebral edema identified when reviewing brain imaging in the subarachnoid territory. There is not mass effect associated with it. We will continue to monitor the patients clinical exam for any worsening of symptoms which may indicate expansion of the stroke or the area of the edema resulting in the clinical change. The pattern is suggestive of L ICA aneurysm etiology.        Thyroid disease  TSH 0.864  Continue home medication     Headache  Complaints of severe onset of headache   Complains of stable but consistent headache   Management per primary     Essential  hypertension  Stroke risk factor  SBP <200 for secured aneurysm  [facility]   SBP within goal at this time          4/17 - MRI revealing subarachnoid blood within the interpeduncular fossa and anterior basal cisterns. Patient went to IR this AM for L ICA embolization w/ coiling. No complaints of headache on exam.  4/19 - NAEON, no headaches or vision changes. Neurologically stable.   4/22/19 - TCDs negative for vasospasm, headache present but treated with tylenol. No complains of weakness, numbness, photophobia or other new neurologic deficit     STROKE DOCUMENTATION        NIH Scale:  1a. Level of Consciousness: 0-->Alert, keenly responsive  1b. LOC Questions: 0-->Answers both questions correctly  1c. LOC Commands: 0-->Performs both tasks correctly  2. Best Gaze: 0-->Normal  3. Visual: 0-->No visual loss  4. Facial Palsy: 0-->Normal symmetrical movements  5a. Motor Arm, Left: 0-->No drift, limb holds 90 (or 45) degrees for full 10 secs  5b. Motor Arm, Right: 0-->No drift, limb holds 90 (or 45) degrees for full 10 secs  6a. Motor Leg, Left: 0-->No drift, leg holds 30 degree position for full 5 secs  6b. Motor Leg, Right: 0-->No drift, leg holds 30 degree position for full 5 secs  7. Limb Ataxia: 0-->Absent  8. Sensory: 0-->Normal, no sensory loss  9. Best Language: 0-->No aphasia, normal  10. Dysarthria: 0-->Normal  11. Extinction and Inattention (formerly Neglect): 0-->No abnormality  Total (NIH Stroke Scale): 0       Modified Cedric Score: 0  [place] Coma Scale:    ABCD2 Score:    [id number] Score:   HAS -BLED Score:   ICH Score:   Hunt & Roth Classification:Grade I     Hemorrhagic change of an Ischemic Stroke: Does this patient have an ischemic stroke with hemorrhagic changes? No     Neurologic Chief Complaint: headache, s/p coiling of L ICA aneurysm    Subjective:     Interval History: Patient is seen for follow-up neurological assessment and treatment recommendations: TCDs negative for vasospasm,  headache present but treated with tylenol. No complains of weakness, numbness, photophobia or other new neurologic deficit     HPI, Past Medical, Family, and Social History remains the same as documented in the initial encounter.     Review of Systems   Constitutional: Negative for fever.   Neurological: Positive for headaches. Negative for weakness and numbness.   Psychiatric/Behavioral: Negative for agitation and confusion.     Scheduled Meds:   atorvastatin  10 mg Oral Daily    heparin (porcine)  5,000 Units Subcutaneous Q8H    levETIRAcetam  500 mg Oral BID    levothyroxine  100 mcg Oral Before breakfast    niMODipine  60 mg Oral Q4H    pantoprazole  40 mg Oral Daily    polyethylene glycol  17 g Oral Daily    senna-docusate 8.6-50 mg  1 tablet Oral BID     Continuous Infusions:   sodium chloride 0.9% 75 mL/hr at 04/22/19 1200     PRN Meds:acetaminophen, fentaNYL, hydrALAZINE, magnesium oxide, magnesium oxide, methylPREDNISolone sodium succinate, ondansetron, oxyCODONE, potassium, sodium phosphates, potassium, sodium phosphates, potassium, sodium phosphates, sodium chloride 0.9%, sodium chloride 0.9%    Objective:     Vital Signs (Most Recent):  Temp: 98.4 °F (36.9 °C) (04/22/19 1100)  Pulse: 74 (04/22/19 1200)  Resp: (!) 24 (04/22/19 1200)  BP: 137/72 (04/21/19 1805)  SpO2: 97 % (04/22/19 1200)  BP Location: Left arm    Vital Signs Range (Last 24H):  Temp:  [98 °F (36.7 °C)-98.8 °F (37.1 °C)]   Pulse:  [51-74]   Resp:  [14-28]   BP: (137-143)/(72-75)   SpO2:  [94 %-99 %]   Arterial Line BP: ()/(56-83)   BP Location: Left arm    Physical Exam    Constitutional: She is oriented to person, place, and time. She appears well-developed.   HENT:   Head: Normocephalic and atraumatic.   Cardiovascular: Normal rate.   Pulmonary/Chest: Effort normal.   Musculoskeletal: Normal range of motion.   Skin: Skin is warm and dry   Psychiatric: She has a normal mood and affect.      Neurological Exam:   LOC:  alert  Attention Span: Good   Language: No aphasia  Articulation: No dysarthria  Orientation: Person, Place, Time   Visual Fields: Full  EOM (CN III, IV, VI): Full/intact  Pupils (CN II, III): PERRL  Facial Sensation (CN V): Normal  Motor: Arm left  Normal 5/5  Leg left  Normal 5/5  Arm right  Normal 5/5  Leg right Normal 5/5  Sensation: Intact to light touch      Laboratory:  CMP:   Recent Labs   Lab 04/22/19  0219   CALCIUM 8.1*   ALBUMIN 2.9*   PROT 5.5*      K 3.8   CO2 21*      BUN 12   CREATININE 0.8   ALKPHOS 53*   ALT 28   AST 31   BILITOT 0.4     CBC:   Recent Labs   Lab 04/22/19 0219   WBC 9.50   RBC 3.68*   HGB 10.9*   HCT 32.5*      MCV 88   MCH 29.6   MCHC 33.5     Lipid Panel:   Recent Labs   Lab 04/16/19 1817   CHOL 178   LDLCALC 101.8   HDL 62   TRIG 71     Hgb A1C:   Recent Labs   Lab 04/16/19 1817   HGBA1C 5.5     TSH:   Recent Labs   Lab 04/16/19 1817   TSH 0.864       Diagnostic Results     Brain Imaging  MRI MRA Brain w wo 4/16  1.6 cm saccular aneurysm arising from the supraclinoid segment of the left ICA.  No additional aneurysm is identified, allowing for the low sensitivity of MRA for small aneurysms.     Vessel Imaging   See above    Cardiac Imaging   TTE 4/17  · Concentric left ventricular remodeling.  · Normal left ventricular systolic function. The estimated ejection fraction is 60%  · Normal LV diastolic function.  · Normal right ventricular systolic function.  · Mild tricuspid regurgitation.  · Normal central venous pressure (3 mm Hg).  · The estimated PA systolic pressure is 40 mm Hg    TCD   4/21/19 - No Doppler evidence of vasospasm.      ALTAF Ray  Comprehensive Stroke Center  Department of Vascular Neurology   [facility]

## 2019-04-22 NOTE — ASSESSMENT & PLAN NOTE
As seen on Imaging  Q1 hr Neuro checks  Stat CT for changes in LOC  Monitor neuro exam and imaging

## 2019-04-22 NOTE — ASSESSMENT & PLAN NOTE
Cardiac Monitoring  VS Q1 Hour  Permissive HTN, SBP goal  <200   Prn hydralazine   Echo; EF 60% mild TR; Pulm. htn

## 2019-04-22 NOTE — ASSESSMENT & PLAN NOTE
SAH PBD6/PCD5  - Neurological checks q1h  - Seizure, fall, aspiration precautions  - Head of bed at 30 degrees at all times,OOB as tolerated  Permissive HTN Post Secure Coiling on 4/17  - TCD  Daily 4/20 and 4/21  Showed no vasospasm, 4/22 pending  - Meds:  Nimodipine 60mg PO q4h  APAP 650 mg PO q6h PRN for pain or headache  Methylprednisolone 60mg q8 prn H/A  Levetiracetam 5000mg bid (for seizure prophylaxis)

## 2019-04-22 NOTE — PLAN OF CARE
04/22/19 1653   Discharge Reassessment   Assessment Type Discharge Planning Reassessment   Provided patient/caregiver education on the expected discharge date and the discharge plan Yes   Do you have any problems affording any of your prescribed medications? No   Discharge Plan A Home with family   Discharge Plan B Home with family;Home Health   DME Needed Upon Discharge  none   Anticipated Discharge Disposition Home   Can the patient answer the patient profile reliably? Yes, cognitively intact   How does the patient rate their overall health at the present time? Good   Describe the patient's ability to walk at the present time. No restrictions   How often would a person be available to care for the patient? Whenever needed   Number of comorbid conditions (as recorded on the chart) Three   During the past month, has the patient often been bothered by feeling down, depressed or hopeless? No   During the past month, has the patient often been bothered by little interest or pleasure in doing things? No   Post-Acute Status   Post-Acute Authorization Other   Other Status No Post-Acute Service Needs   Discharge Delays None       Gemini Hendrickson RN, CCRN-K, [facility]  Neuro-Critical Care   [phone]

## 2019-04-22 NOTE — PROGRESS NOTES
[facility]  Neurocritical Care  Progress Note    Admit Date: 2019  Service Date: 2019  Length of Stay: 6    Subjective:     Chief Complaint: SAH (subarachnoid hemorrhage)    History of Present Illness: The patient is a 63-year-old female with PMH of HTN, HLD, Thyroid Disease, that presents as a transfer from [facility] ([facility]) for management of intracranial aneurysm.  Patient presented to [facility] ED with complaints of sudden onset severe headache with neck stiffness that began at 8am today. She acknowledges, headache, visual disturbances, nausea without vomiting, but denies numbness and tingling.  CT head and CTA brain were obtained. 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  There was no evidence of acute intracranial hemorrhage.   She  endorses having taken IBU but with no relief. Denies anticoagulant use. Of note,  patient's father  or intracerebral aneurysm at an early age.        Hospital Course:  Admit to NCC, Keppra, Nimitop, CTH, MRI   s/p Angio, ECHO, Permissive HTN   Solumedrol for HA, Start heparin, Start Diet   Boost added to diet, Solumedrol scheduled, Myralax, ABG    PBD 4 . No significant events over night. TCDs  With no vasospasms. Restarted home meds.   TCDs pending. No H/A today. wea keppra. EEG negative  TCDs from  showed no evidence of vasospasms. No H/A today. No change in neuro exam. Bolus 500cc to keep I/O equal.    Interval History: no significant events over night. TCDs from  showed no evidence of vasospasms. No H/A today. No change in neuro exam. Bolus 500cc to keep I/O equal.      Review of Systems:   Constitutional: Denies fevers or chills.  Pulmonary: Denies shortness of breath or cough.  Cardiology: Denies chest pain or palpitations.  GI: Denies abdominal pain or constipation.  Neurologic: Denies new weakness,  headache, or paresthesias.    Vitals:   Temp: 98.4 °F  (36.9 °C)  Pulse: (!) 58  Rhythm: normal sinus rhythm  Resp: 15  SpO2: (!) 94 %  O2 Device (Oxygen Therapy): room air    Temp  Min: 97.9 °F (36.6 °C)  Max: 98.4 °F (36.9 °C)  Pulse  Min: 51  Max: 93  BP  Min: 128/67  Max: 161/78  Resp  Min: 14  Max: 35  SpO2  Min: 94 %  Max: 100 %    04/21 0701 - 04/22 0700  In: 2046.3 [P.O.:1140; I.V.:906.3]  Out: 2550 [Urine:2550]   Unmeasured Output  Urine Occurrence: 1  Stool Occurrence: 1  Pad Count: 1     Examination:   Constitutional: Well-nourished and -developed. No apparent distress.   Eyes: Conjunctiva clear, anicteric. Lids no lesions.  Head/Ears/Nose/Mouth/Throat/Neck: Moist mucous membranes. External ears, nose atraumatic.   Cardiovascular: Regular rhythm. No murmurs. No leg edema.  Respiratory: Comfortable respirations. Clear to auscultation.  Gastrointestinal: No hernia. Soft, nondistended, nontender. + bowel sounds.    Neurologic:  -GCS [id number]  -Alert. Oriented to person, place, and time. Speech fluent. Follows commands.  -Cranial nerves II-XII intact,PERRL 3+ EOMI. + cough, gag  -Motor 5/5 all extremities MACK spontaneously  -Sensation bilat intact     Medications:   Continuous  sodium chloride 0.9% Last Rate: 75 mL/hr at 04/22/19 0600   Scheduled  atorvastatin 10 mg Daily   heparin (porcine) 5,000 Units Q8H   levETIRAcetam 500 mg BID   levothyroxine 100 mcg Before breakfast   niMODipine 60 mg Q4H   pantoprazole 40 mg Daily   polyethylene glycol 17 g Daily   senna-docusate 8.6-50 mg 1 tablet BID   sodium chloride 0.9% 500 mL Once   PRN  acetaminophen 650 mg Q6H PRN   fentaNYL 12.5 mcg Daily PRN   hydrALAZINE 10 mg Q4H PRN   magnesium oxide 800 mg PRN   magnesium oxide 800 mg PRN   methylPREDNISolone sodium succinate 60 mg Q8H PRN   ondansetron 4 mg Q6H PRN   oxyCODONE 5 mg Q6H PRN   potassium, sodium phosphates 2 packet PRN   potassium, sodium phosphates 2 packet PRN   potassium, sodium phosphates 2 packet PRN   sodium chloride 0.9% 10 mL PRN   sodium chloride 0.9%  10 mL PRN      Today I independently reviewed pertinent medications, lines/drains/airways, imaging, laboratory results, microbiology results, notably:     ISTAT: No results for input(s): PH, PCO2, PO2, POCSATURATED, HCO3, BE, POCNA, POCK, POCTCO2, POCGLU, POCICA, POCLAC, SAMPLE in the last 24 hours.   Chem: Recent Labs   Lab 04/22/19 0219      K 3.8      CO2 21*      BUN 12   CREATININE 0.8   ESTGFRAFRICA >60.0   EGFRNONAA >60.0   CALCIUM 8.1*   MG 1.9   PHOS 3.4   ANIONGAP 8   PROT 5.5*   ALBUMIN 2.9*   BILITOT 0.4   ALKPHOS 53*   AST 31   ALT 28     Heme: Recent Labs   Lab 04/22/19 0219   WBC 9.50   HGB 10.9*   HCT 32.5*        Endo: No results for input(s): POCTGLUCOSE in the last 24 hours.   Assessment/Plan:     Neuro  * SAH (subarachnoid hemorrhage)  SAH PBD6/PCD5  - Neurological checks q1h  - Seizure, fall, aspiration precautions  - Head of bed at 30 degrees at all times,OOB as tolerated  Permissive HTN Post Secure Coiling on 4/17  - TCD  Daily 4/20 and 4/21  Showed no vasospasm, 4/22 pending  - Meds:  Nimodipine 60mg PO q4h  APAP 650 mg PO q6h PRN for pain or headache  Methylprednisolone 60mg q8 prn H/A  Levetiracetam 5000mg bid (for seizure prophylaxis)      Vasogenic cerebral edema  As seen on Imaging  Q1 hr Neuro checks  Stat CT for changes in LOC  Monitor neuro exam and imaging      Headache  Tylenol PRN  Oxycodone PRN  Methylprednisolone 60mg q8h prn, for HA and neck pain/stiffness    Cardiac/Vascular  Hyperlipidemia, mixed   atorvastatin 10mg daily    Essential hypertension  Cardiac Monitoring  VS Q1 Hour  Permissive HTN, SBP goal  <200   Prn hydralazine   Echo; EF 60% mild TR; Pulm. htn    Renal/  Compensated respiratory alkalosis  On RA O2 sats 96%  PRN ABG  Monitor Respiratory Response, currently No signs of distress    Endocrine  Thyroid disease  History of  on Admit  TSH .684  Free T4 1.32  Restarted levothyroxine 100mcg daily    GI  GERD without esophagitis  Restarted  protonix 40mg daily    Other  S/P coil embolization of cerebral aneurysm  S/P Coil of ICA Aneurysm PCD4/PBD 5  Permissive HTN < 200  Continue Nimitop and Daily TCDs  ECHO complete/ EF 60%  Continue IVF NS 75cc/h  4/20 Bolused 500cc to match urine output  Tolerating Diet            The patient is being Prophylaxed for:  Venous Thromboembolism with: Mechanical  Stress Ulcer with: PPI  Ventilator Pneumonia with: not applicable    Activity Orders          Diet Adult Regular (IDDSI Level 7) [facility]; Thin: Regular starting at 04/18 0707        Full Code     I have spent 35 min with this patient, with over 50% of this time spent coordinating care and speaking with the family    Dagmar Cordova NP  Neurocritical Care  [facility]

## 2019-04-22 NOTE — ASSESSMENT & PLAN NOTE
Patient with complaint of sudden onset of HA this AM. Seen at [facility] for severe headache and photophobia. CTA completed which revealed left ICA saccular aneurysm. Patient transferred to [facility] for further neurological evaluation. Admitted to [facility]. NSx consulted.    MRI revealing SAH within interpeduncular fossa and the anterior basal cisterns. Small amount of IVH in the occipital horn of the R lateral ventricle. Patient s/p L ICA embolization w/ coiling completed in IR 4/17.    Continue Nimotop 60q4 x 21 days, and daily TCDs x 14 days, and Keppra 500bid x 7 days    Antithrombotics for secondary stroke prevention:  None due to aneurysm and potential SAH     Statins for secondary stroke prevention and hyperlipidemia, if present:   Continue Atorvastatin 40 mg     Aggressive risk factor modification: family hx of aneurysm, L ICA aneusym      Rehab efforts: PT/OT/SLP to evaluate and treat - no needs at this time     Diagnostics ordered/pending: daily TCDs    VTE prophylaxis: None due to potential SAH; mechanical SCDs     BP parameters: SAH: Secured aneurysm, SBP < 200 per NICU

## 2019-04-22 NOTE — PLAN OF CARE
Problem: Adult Inpatient Plan of Care  Goal: Plan of Care Review  Outcome: Ongoing (interventions implemented as appropriate)  POC reviewed with pt and family at 1400. Pt verbalized understanding. Questions and concerns addressed. PRN Tylenol given once for headache. TCD done. No change in neuro status. Pt progressing toward goals. Will continue to monitor. See flowsheets for full assessment and VS info.

## 2019-04-22 NOTE — ASSESSMENT & PLAN NOTE
Complaints of severe onset of headache   Complains of stable but consistent headache   Management per primary

## 2019-04-22 NOTE — SUBJECTIVE & OBJECTIVE
Neurologic Chief Complaint: headache, s/p coiling of L ICA aneurysm    Subjective:     Interval History: Patient is seen for follow-up neurological assessment and treatment recommendations: TCDs negative for vasospasm, headache present but treated with tylenol. No complains of weakness, numbness, photophobia or other new neurologic deficit     HPI, Past Medical, Family, and Social History remains the same as documented in the initial encounter.     Review of Systems   Constitutional: Negative for fever.   Neurological: Positive for headaches. Negative for weakness and numbness.   Psychiatric/Behavioral: Negative for agitation and confusion.     Scheduled Meds:   atorvastatin  10 mg Oral Daily    heparin (porcine)  5,000 Units Subcutaneous Q8H    levETIRAcetam  500 mg Oral BID    levothyroxine  100 mcg Oral Before breakfast    niMODipine  60 mg Oral Q4H    pantoprazole  40 mg Oral Daily    polyethylene glycol  17 g Oral Daily    senna-docusate 8.6-50 mg  1 tablet Oral BID     Continuous Infusions:   sodium chloride 0.9% 75 mL/hr at 04/22/19 1200     PRN Meds:acetaminophen, fentaNYL, hydrALAZINE, magnesium oxide, magnesium oxide, methylPREDNISolone sodium succinate, ondansetron, oxyCODONE, potassium, sodium phosphates, potassium, sodium phosphates, potassium, sodium phosphates, sodium chloride 0.9%, sodium chloride 0.9%    Objective:     Vital Signs (Most Recent):  Temp: 98.4 °F (36.9 °C) (04/22/19 1100)  Pulse: 74 (04/22/19 1200)  Resp: (!) 24 (04/22/19 1200)  BP: 137/72 (04/21/19 1805)  SpO2: 97 % (04/22/19 1200)  BP Location: Left arm    Vital Signs Range (Last 24H):  Temp:  [98 °F (36.7 °C)-98.8 °F (37.1 °C)]   Pulse:  [51-74]   Resp:  [14-28]   BP: (137-143)/(72-75)   SpO2:  [94 %-99 %]   Arterial Line BP: ()/(56-83)   BP Location: Left arm    Physical Exam    Constitutional: She is oriented to person, place, and time. She appears well-developed.   HENT:   Head: Normocephalic and atraumatic.    Cardiovascular: Normal rate.   Pulmonary/Chest: Effort normal.   Musculoskeletal: Normal range of motion.   Skin: Skin is warm and dry   Psychiatric: She has a normal mood and affect.      Neurological Exam:   LOC: alert  Attention Span: Good   Language: No aphasia  Articulation: No dysarthria  Orientation: Person, Place, Time   Visual Fields: Full  EOM (CN III, IV, VI): Full/intact  Pupils (CN II, III): PERRL  Facial Sensation (CN V): Normal  Motor: Arm left  Normal 5/5  Leg left  Normal 5/5  Arm right  Normal 5/5  Leg right Normal 5/5  Sensation: Intact to light touch      Laboratory:  CMP:   Recent Labs   Lab 04/22/19 0219   CALCIUM 8.1*   ALBUMIN 2.9*   PROT 5.5*      K 3.8   CO2 21*      BUN 12   CREATININE 0.8   ALKPHOS 53*   ALT 28   AST 31   BILITOT 0.4     CBC:   Recent Labs   Lab 04/22/19 0219   WBC 9.50   RBC 3.68*   HGB 10.9*   HCT 32.5*      MCV 88   MCH 29.6   MCHC 33.5     Lipid Panel:   Recent Labs   Lab 04/16/19 1817   CHOL 178   LDLCALC 101.8   HDL 62   TRIG 71     Hgb A1C:   Recent Labs   Lab 04/16/19 1817   HGBA1C 5.5     TSH:   Recent Labs   Lab 04/16/19 1817   TSH 0.864       Diagnostic Results     Brain Imaging  MRI MRA Brain w wo 4/16  1.6 cm saccular aneurysm arising from the supraclinoid segment of the left ICA.  No additional aneurysm is identified, allowing for the low sensitivity of MRA for small aneurysms.     Vessel Imaging   See above    Cardiac Imaging   TTE 4/17  · Concentric left ventricular remodeling.  · Normal left ventricular systolic function. The estimated ejection fraction is 60%  · Normal LV diastolic function.  · Normal right ventricular systolic function.  · Mild tricuspid regurgitation.  · Normal central venous pressure (3 mm Hg).  · The estimated PA systolic pressure is 40 mm Hg    TCD   4/21/19 - No Doppler evidence of vasospasm.

## 2019-04-22 NOTE — SUBJECTIVE & OBJECTIVE
Interval History:    NAEON. No issues per nursing. HA improving. TCD without sonographic evidence for vasospasm. Intact on exam.     Medications:  Continuous Infusions:   sodium chloride 0.9% 75 mL/hr at 04/22/19 1200     Scheduled Meds:   atorvastatin  10 mg Oral Daily    heparin (porcine)  5,000 Units Subcutaneous Q8H    levETIRAcetam  500 mg Oral BID    levothyroxine  100 mcg Oral Before breakfast    niMODipine  60 mg Oral Q4H    pantoprazole  40 mg Oral Daily    polyethylene glycol  17 g Oral Daily    senna-docusate 8.6-50 mg  1 tablet Oral BID     PRN Meds:acetaminophen, fentaNYL, hydrALAZINE, magnesium oxide, magnesium oxide, methylPREDNISolone sodium succinate, ondansetron, oxyCODONE, potassium, sodium phosphates, potassium, sodium phosphates, potassium, sodium phosphates, sodium chloride 0.9%, sodium chloride 0.9%     Review of Systems    Objective:     Weight: 77.1 kg (169 lb 15.6 oz)  Body mass index is 26.62 kg/m².  Vital Signs (Most Recent):  Temp: 98.4 °F (36.9 °C) (04/22/19 1100)  Pulse: 74 (04/22/19 1200)  Resp: (!) 24 (04/22/19 1200)  BP: 137/72 (04/21/19 1805)  SpO2: 97 % (04/22/19 1200) Vital Signs (24h Range):  Temp:  [97.9 °F (36.6 °C)-98.8 °F (37.1 °C)] 98.4 °F (36.9 °C)  Pulse:  [51-74] 74  Resp:  [14-33] 24  SpO2:  [94 %-99 %] 97 %  BP: (128-161)/(67-78) 137/72  Arterial Line BP: ()/(56-86) 144/56     Date 04/22/19 0700 - 04/23/19 0659   Shift 6697-1342 8140-6777 6129-9419 24 Hour Total   INTAKE   I.V.(mL/kg) 1343.8(17.4)   1343.8(17.4)   IV Piggyback 500   500   Shift Total(mL/kg) 1843.8(23.9)   1843.8(23.9)   OUTPUT   Urine(mL/kg/hr) 1100   1100   Shift Total(mL/kg) 1100(14.3)   1100(14.3)   Weight (kg) 77.1 77.1 77.1 77.1       Neurosurgery Physical Exam    E4V5M6  Face symm  PERRLA  TM, CN 2-12 grossly normal  MACK, FCX4  Groin soft       Significant Labs:  Recent Labs   Lab 04/21/19  0244 04/22/19  0219   * 101    139   K 4.2 3.8    110   CO2 19* 21*   BUN  13 12   CREATININE 0.8 0.8   CALCIUM 8.4* 8.1*   MG 1.9 1.9     Recent Labs   Lab 04/21/19  0244 04/22/19  0219   WBC 7.97 9.50   HGB 12.0 10.9*   HCT 36.1* 32.5*    251

## 2019-04-22 NOTE — CONSULTS
PM&R consult follow up.  Please see original consult for detailed note.      Evaluated by therapy.  Speech, language, and cognitive skills WFL/at baseline.  Patient is SBA/SV with mobility, transfers, functional ambulation, and ADLs.  Patient near prior level of function and without rehab goals.  Will sign off.  Please call with questions/concerns or re-consult if situation changes.    Thank you for consult.    RIVERA Mackay, FNP-C  Physical Medicine & Rehabilitation   04/22/2019  Spectralink: [id number]

## 2019-04-23 LAB
ALBUMIN SERPL BCP-MCNC: 2.9 G/DL (ref 3.5–5.2)
ALP SERPL-CCNC: 56 U/L (ref 55–135)
ALT SERPL W/O P-5'-P-CCNC: 42 U/L (ref 10–44)
ANION GAP SERPL CALC-SCNC: 9 MMOL/L (ref 8–16)
AST SERPL-CCNC: 29 U/L (ref 10–40)
BASOPHILS # BLD AUTO: 0.01 K/UL (ref 0–0.2)
BASOPHILS NFR BLD: 0.1 % (ref 0–1.9)
BILIRUB SERPL-MCNC: 0.3 MG/DL (ref 0.1–1)
BUN SERPL-MCNC: 10 MG/DL (ref 8–23)
CALCIUM SERPL-MCNC: 7.9 MG/DL (ref 8.7–10.5)
CHLORIDE SERPL-SCNC: 109 MMOL/L (ref 95–110)
CO2 SERPL-SCNC: 20 MMOL/L (ref 23–29)
CREAT SERPL-MCNC: 0.7 MG/DL (ref 0.5–1.4)
DIFFERENTIAL METHOD: ABNORMAL
EOSINOPHIL # BLD AUTO: 0 K/UL (ref 0–0.5)
EOSINOPHIL NFR BLD: 0 % (ref 0–8)
ERYTHROCYTE [DISTWIDTH] IN BLOOD BY AUTOMATED COUNT: 12.6 % (ref 11.5–14.5)
EST. GFR  (AFRICAN AMERICAN): >60 ML/MIN/1.73 M^2
EST. GFR  (NON AFRICAN AMERICAN): >60 ML/MIN/1.73 M^2
GLUCOSE SERPL-MCNC: 122 MG/DL (ref 70–110)
HCT VFR BLD AUTO: 32.7 % (ref 37–48.5)
HGB BLD-MCNC: 11 G/DL (ref 12–16)
IMM GRANULOCYTES # BLD AUTO: 0.18 K/UL (ref 0–0.04)
IMM GRANULOCYTES NFR BLD AUTO: 2.1 % (ref 0–0.5)
LYMPHOCYTES # BLD AUTO: 0.8 K/UL (ref 1–4.8)
LYMPHOCYTES NFR BLD: 9.6 % (ref 18–48)
MAGNESIUM SERPL-MCNC: 1.9 MG/DL (ref 1.6–2.6)
MCH RBC QN AUTO: 30.1 PG (ref 27–31)
MCHC RBC AUTO-ENTMCNC: 33.6 G/DL (ref 32–36)
MCV RBC AUTO: 89 FL (ref 82–98)
MONOCYTES # BLD AUTO: 0.6 K/UL (ref 0.3–1)
MONOCYTES NFR BLD: 6.6 % (ref 4–15)
NEUTROPHILS # BLD AUTO: 7.1 K/UL (ref 1.8–7.7)
NEUTROPHILS NFR BLD: 81.6 % (ref 38–73)
NRBC BLD-RTO: 0 /100 WBC
PHOSPHATE SERPL-MCNC: 3.5 MG/DL (ref 2.7–4.5)
PLATELET # BLD AUTO: 246 K/UL (ref 150–350)
PMV BLD AUTO: 10.1 FL (ref 9.2–12.9)
POTASSIUM SERPL-SCNC: 3.6 MMOL/L (ref 3.5–5.1)
PROT SERPL-MCNC: 5.5 G/DL (ref 6–8.4)
RBC # BLD AUTO: 3.66 M/UL (ref 4–5.4)
SODIUM SERPL-SCNC: 138 MMOL/L (ref 136–145)
WBC # BLD AUTO: 8.75 K/UL (ref 3.9–12.7)

## 2019-04-23 PROCEDURE — 97535 SELF CARE MNGMENT TRAINING: CPT

## 2019-04-23 PROCEDURE — 85025 COMPLETE CBC W/AUTO DIFF WBC: CPT

## 2019-04-23 PROCEDURE — 80053 COMPREHEN METABOLIC PANEL: CPT

## 2019-04-23 PROCEDURE — 85610 PROTHROMBIN TIME: CPT

## 2019-04-23 PROCEDURE — 83735 ASSAY OF MAGNESIUM: CPT

## 2019-04-23 PROCEDURE — 25000003 PHARM REV CODE 250: Performed by: NURSE PRACTITIONER

## 2019-04-23 PROCEDURE — 84100 ASSAY OF PHOSPHORUS: CPT | Mod: 91

## 2019-04-23 PROCEDURE — 99233 SBSQ HOSP IP/OBS HIGH 50: CPT | Mod: ,,, | Performed by: PHYSICIAN ASSISTANT

## 2019-04-23 PROCEDURE — 20000000 HC ICU ROOM

## 2019-04-23 PROCEDURE — 85730 THROMBOPLASTIN TIME PARTIAL: CPT

## 2019-04-23 PROCEDURE — 63600175 PHARM REV CODE 636 W HCPCS: Performed by: NURSE PRACTITIONER

## 2019-04-23 PROCEDURE — 25000003 PHARM REV CODE 250: Performed by: PSYCHIATRY & NEUROLOGY

## 2019-04-23 PROCEDURE — 94761 N-INVAS EAR/PLS OXIMETRY MLT: CPT

## 2019-04-23 PROCEDURE — 99233 PR SUBSEQUENT HOSPITAL CARE,LEVL III: ICD-10-PCS | Mod: ,,, | Performed by: PHYSICIAN ASSISTANT

## 2019-04-23 RX ADMIN — LEVETIRACETAM 500 MG: 500 TABLET ORAL at 09:04

## 2019-04-23 RX ADMIN — OXYCODONE HYDROCHLORIDE 5 MG: 5 TABLET ORAL at 02:04

## 2019-04-23 RX ADMIN — METHYLPREDNISOLONE SODIUM SUCCINATE 60 MG: 125 INJECTION, POWDER, FOR SOLUTION INTRAMUSCULAR; INTRAVENOUS at 09:04

## 2019-04-23 RX ADMIN — NIMODIPINE 60 MG: 30 CAPSULE, LIQUID FILLED ORAL at 01:04

## 2019-04-23 RX ADMIN — ACETAMINOPHEN 650 MG: 325 TABLET ORAL at 03:04

## 2019-04-23 RX ADMIN — STANDARDIZED SENNA CONCENTRATE AND DOCUSATE SODIUM 1 TABLET: 8.6; 5 TABLET, FILM COATED ORAL at 08:04

## 2019-04-23 RX ADMIN — PANTOPRAZOLE SODIUM 40 MG: 40 TABLET, DELAYED RELEASE ORAL at 09:04

## 2019-04-23 RX ADMIN — LEVOTHYROXINE SODIUM 100 MCG: 50 TABLET ORAL at 05:04

## 2019-04-23 RX ADMIN — ACETAMINOPHEN 650 MG: 325 TABLET ORAL at 09:04

## 2019-04-23 RX ADMIN — HEPARIN SODIUM 5000 UNITS: 5000 INJECTION, SOLUTION INTRAVENOUS; SUBCUTANEOUS at 09:04

## 2019-04-23 RX ADMIN — NIMODIPINE 60 MG: 30 CAPSULE, LIQUID FILLED ORAL at 09:04

## 2019-04-23 RX ADMIN — HEPARIN SODIUM 5000 UNITS: 5000 INJECTION, SOLUTION INTRAVENOUS; SUBCUTANEOUS at 02:04

## 2019-04-23 RX ADMIN — HEPARIN SODIUM 5000 UNITS: 5000 INJECTION, SOLUTION INTRAVENOUS; SUBCUTANEOUS at 05:04

## 2019-04-23 RX ADMIN — ATORVASTATIN CALCIUM 10 MG: 10 TABLET, FILM COATED ORAL at 09:04

## 2019-04-23 RX ADMIN — STANDARDIZED SENNA CONCENTRATE AND DOCUSATE SODIUM 1 TABLET: 8.6; 5 TABLET, FILM COATED ORAL at 09:04

## 2019-04-23 RX ADMIN — NIMODIPINE 60 MG: 30 CAPSULE, LIQUID FILLED ORAL at 06:04

## 2019-04-23 RX ADMIN — METHYLPREDNISOLONE SODIUM SUCCINATE 60 MG: 125 INJECTION, POWDER, FOR SOLUTION INTRAMUSCULAR; INTRAVENOUS at 03:04

## 2019-04-23 RX ADMIN — NIMODIPINE 60 MG: 30 CAPSULE, LIQUID FILLED ORAL at 02:04

## 2019-04-23 RX ADMIN — NIMODIPINE 60 MG: 30 CAPSULE, LIQUID FILLED ORAL at 05:04

## 2019-04-23 NOTE — PROGRESS NOTES
[facility]  Neurocritical Care  Progress Note    Admit Date: 2019  Service Date: 2019  Length of Stay: 7    Subjective:     Chief Complaint: SAH (subarachnoid hemorrhage)    History of Present Illness: The patient is a 63-year-old female with PMH of HTN, HLD, Thyroid Disease, that presents as a transfer from [facility] ([facility]) for management of intracranial aneurysm.  Patient presented to [facility] ED with complaints of sudden onset severe headache with neck stiffness that began at 8am today. She acknowledges, headache, visual disturbances, nausea without vomiting, but denies numbness and tingling.  CT head and CTA brain were obtained. 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  There was no evidence of acute intracranial hemorrhage.   She  endorses having taken IBU but with no relief. Denies anticoagulant use. Of note,  patient's father  or intracerebral aneurysm at an early age.        Hospital Course:  Admit to NCC, Keppra, Nimitop, CTH, MRI   s/p Angio, ECHO, Permissive HTN   Solumedrol for HA, Start heparin, Start Diet   Boost added to diet, Solumedrol scheduled, Myralax, ABG    PBD 4 . No significant events over night. TCDs  With no vasospasms. Restarted home meds.   TCDs pending. No H/A today. wea keppra. EEG negative  TCDs from  - showed no evidence of vasospasms. No H/A today. No change in neuro exam. Bolus 500cc to keep I/O equal.  : NAEO    Interval History: NAEO.      Review of Systems: Review of Systems   Cardiovascular: Negative for chest pain and palpitations.   Gastrointestinal: Negative for nausea and vomiting.   Neurological: Negative for sensory change, speech change, focal weakness, seizures and headaches.   All other systems reviewed and are negative.         Vitals:   Temp: 99.1 °F (37.3 °C)  Pulse: 69  Rhythm: normal sinus rhythm  BP: (!) 145/68  MAP (mmHg): 98  Resp: 19  SpO2: 97  %  O2 Device (Oxygen Therapy): room air    Temp  Min: 98.5 °F (36.9 °C)  Max: 99.1 °F (37.3 °C)  Pulse  Min: 53  Max: 79  BP  Min: 106/85  Max: 159/77  MAP (mmHg)  Min: 87  Max: 113  Resp  Min: 15  Max: 27  SpO2  Min: 95 %  Max: 98 %    04/22 0701 - 04/23 0700  In: 3345 [P.O.:150; I.V.:2695]  Out: 2450 [Urine:2450]   Unmeasured Output  Urine Occurrence: 0  Stool Occurrence: 0  Pad Count: 1     Examination:   Constitutional: Well-nourished and -developed. No apparent distress.   Eyes: Conjunctiva clear, anicteric. Lids no lesions.  Head/Ears/Nose/Mouth/Throat/Neck: Moist mucous membranes. External ears, nose atraumatic.   Cardiovascular: Regular rhythm. No murmurs. No leg edema.  Respiratory: Comfortable respirations. Clear to auscultation.  Gastrointestinal: No hernia. Soft, nondistended, nontender. + bowel sounds.    Neurologic:  -GCS E4V5M6  -Alert. Oriented to person, place, and time. Speech fluent. Follows commands.  -Cranial nerves II-XII intact  -Motor 5/5 throughout   -Sensation grossly intact  -PERRL      Medications:   Continuous  sodium chloride 0.9% Last Rate: 75 mL/hr at 04/23/19 1200   Scheduled  atorvastatin 10 mg Daily   heparin (porcine) 5,000 Units Q8H   levothyroxine 100 mcg Before breakfast   niMODipine 60 mg Q4H   pantoprazole 40 mg Daily   polyethylene glycol 17 g Daily   senna-docusate 8.6-50 mg 1 tablet BID   PRN  acetaminophen 650 mg Q6H PRN   fentaNYL 12.5 mcg Daily PRN   hydrALAZINE 10 mg Q4H PRN   magnesium oxide 800 mg PRN   magnesium oxide 800 mg PRN   methylPREDNISolone sodium succinate 60 mg Q8H PRN   ondansetron 4 mg Q6H PRN   oxyCODONE 5 mg Q6H PRN   potassium, sodium phosphates 2 packet PRN   potassium, sodium phosphates 2 packet PRN   potassium, sodium phosphates 2 packet PRN   sodium chloride 0.9% 10 mL PRN   sodium chloride 0.9% 10 mL PRN      Today I independently reviewed pertinent medications, lines/drains/airways, imaging, cardiology results, laboratory results, microbiology  results,     ISTAT: No results for input(s): PH, PCO2, PO2, POCSATURATED, HCO3, BE, POCNA, POCK, POCTCO2, POCGLU, POCICA, POCLAC, SAMPLE in the last 24 hours.   Chem: Recent Labs   Lab 04/23/19  0112      K 3.6      CO2 20*   *   BUN 10   CREATININE 0.7   ESTGFRAFRICA >60.0   EGFRNONAA >60.0   CALCIUM 7.9*   MG 1.9   PHOS 3.5   ANIONGAP 9   PROT 5.5*   ALBUMIN 2.9*   BILITOT 0.3   ALKPHOS 56   AST 29   ALT 42     Heme: Recent Labs   Lab 04/23/19  0112   WBC 8.75   HGB 11.0*   HCT 32.7*        Endo: No results for input(s): POCTGLUCOSE in the last 24 hours.       Assessment/Plan:     Neuro  * SAH (subarachnoid hemorrhage)  PBD 7, PCD 6  Permissive HTN, SBP < 200  Nimodipine 60 mg q 4 h  Daily TCDs, no vasospasm noted  Euvolemia  Keppra for seizure ppx      Cardiac/Vascular  Hyperlipidemia, mixed   atorvastatin 10mg daily    Essential hypertension  Cardiac Monitoring  Permissive HTN, SBP goal  <200   Prn hydralazine   Echo; EF 60% mild TR; Pulm. htn    Endocrine  Thyroid disease  TSH .684  Free T4 1.32  Levothyroxine 100 mcg daily     GI  GERD without esophagitis  Pantoprazole 40 mg daily     Other  S/P coil embolization of cerebral aneurysm  S/p coiling of L ICA Aneurysm   PBD 7, PCD 6  Permissive HTN < 200  Nimodipine 60 mg q 4 h   Daily TCDs, no vasospasm noted   Goal euvolemia             The patient is being Prophylaxed for:  Venous Thromboembolism with: Chemical  Stress Ulcer with: PPI  Ventilator Pneumonia with: not applicable    Activity Orders          Diet Adult Regular (IDDSI Level 7) [facility]; Thin: Regular starting at 04/18 0707        Full Code    Freda Vallejo PA-C  Neurocritical Care  [facility]

## 2019-04-23 NOTE — PLAN OF CARE
Problem: Adult Inpatient Plan of Care  Goal: Plan of Care Review  Outcome: Ongoing (interventions implemented as appropriate)  POC reviewed with pt at 0330. Pt verbalized understanding. Questions and concerns addressed. No acute events overnight. Midline consult placed. PRN Tylenol and methylprednisolone administered for headache. Pt progressing toward goals. Will continue to monitor. See flowsheets for full assessment and VS info.

## 2019-04-23 NOTE — ASSESSMENT & PLAN NOTE
PBD 7, PCD 6  Permissive HTN, SBP < 200  Nimodipine 60 mg q 4 h  Daily TCDs, no vasospasm noted  Euvolemia  Keppra for seizure ppx

## 2019-04-23 NOTE — PT/OT/SLP PROGRESS
"Occupational Therapy   Treatment/Discharge Summary    Name: Adriana Fonseca  MRN: [id number]  Admitting Diagnosis:  SAH (subarachnoid hemorrhage)  6 Days Post-Op    Recommendations:     Discharge Recommendations: home  Discharge Equipment Recommendations:  none  Barriers to discharge:  None    Assessment:     Adriana Fonseca is a 63 y.o. female with a medical diagnosis of SAH (subarachnoid hemorrhage).  She presents without performance deficits of physical skills; cognitive skills or psychosocial skills.  Able to organize occupational performance in a timely and safe manner; demonstrating skills necessary to successfully and appropriately participate in everyday tasks and social situations.  No activity limitations noted. No further OT needed.   Rehab Prognosis:  Good; patient would benefit from acute skilled OT services to address these deficits and reach maximum level of function.       Plan:     · Discharge from OT services on acute  · Plan of Care Expires: 04/16/19  · Plan of Care Reviewed with: patient    Subjective   Patient:  "I am good.  I still get headaches but they are much more manageable."  Pain/Comfort:  · Pain Rating 1: 0/10  · Pain Rating Post-Intervention 1: 0/10    Objective:     Communicated with: Nurse prior to session.  Patient found supine with arterial line, blood pressure cuff, telemetry, peripheral IV, pulse ox (continuous) upon OT entry to room.  Family not present.     General Precautions: Standard, aspiration, fall   Orthopedic Precautions:N/A   Braces: N/A     Occupational Performance:     Bed Mobility:    · Patient completed Rolling/Turning to Left with  independence  · Patient completed Rolling/Turning to Right with independence  · Patient completed Scooting/Bridging with independence  · Patient completed Supine to Sit with independence  · Patient completed Sit to Supine with independence     Functional Mobility/Transfers:  · Patient completed Sit <> Stand Transfer with independence  with  no " assistive device   · Patient completed Bed <> Chair Transfer using Stand Pivot technique with independence with no assistive device    Activities of Daily Living:  · Grooming: modified independence while standing  · Upper Body Dressing: modified independence    · Lower Body Dressing: modified independence    · Toileting: modified independence    Modified independence with ADLs 2* lines.    [facility] 6 Click ADL: 24  Patient left supine with all lines intact, call button in reach and bed alarm onEducation:      GOALS:   Multidisciplinary Problems     Occupational Therapy Goals     Not on file          Multidisciplinary Problems (Resolved)        Problem: Occupational Therapy Goal    Goal Priority Disciplines Outcome Interventions   Occupational Therapy Goal   (Resolved)     OT, PT/OT Outcome(s) achieved    Description:  Goals to be met by: 4/25     Patient will increase functional independence with ADLs by performing:    UB/LB Dressing with set up and mod(I).  Grooming while standing at sink with Set-up Assistance and Supervision.  Toilet transfer to toilet with Supervision.  Toileting care and clothing mgmt for task with Supervision.   Functional mobility at household and short community distance for ADL task with Supervision.  Pt will verbalize s/s of stroke with teachback understanding                      Time Tracking:     OT Date of Treatment: 04/23/19  OT Start Time: 0600  OT Stop Time: 0616  OT Total Time (min): 16 min    Billable Minutes:Self Care/Home Management 16    CLOVIS Cm  4/23/2019

## 2019-04-23 NOTE — PLAN OF CARE
Problem: Occupational Therapy Goal  Goal: Occupational Therapy Goal  Goals to be met by: 4/25     Patient will increase functional independence with ADLs by performing:    UB/LB Dressing with set up and mod(I).  Grooming while standing at sink with Set-up Assistance and Supervision.  Toilet transfer to toilet with Supervision.  Toileting care and clothing mgmt for task with Supervision.   Functional mobility at household and short community distance for ADL task with Supervision.  Pt will verbalize s/s of stroke with teachback understanding     Outcome: Outcome(s) achieved Date Met: 04/23/19  All goals achieved.  CLOVIS Cm  4/23/2019

## 2019-04-23 NOTE — ASSESSMENT & PLAN NOTE
S/p coiling of L ICA Aneurysm   PBD 7, PCD 6  Permissive HTN < 200  Nimodipine 60 mg q 4 h   Daily TCDs, no vasospasm noted   Goal euvolemia

## 2019-04-23 NOTE — PROGRESS NOTES
"[facility]  Adult Nutrition  Progress Note    SUMMARY       Recommendations    Recommendation/Intervention:   Continue Regular diet. Pt consuming >75% of meals - will cancel Boost as pt refusing at this time.     RD to monitor.    Goals: Pt to receive and tolerate >85% EEN and EPN by RD follow up  Nutrition Goal Status: goal met  Communication of RD Recs: reviewed with RN    Reason for Assessment    Reason For Assessment: RD follow-up  Diagnosis: hemorrhage  Relevant Medical History: HTN, HLD, thyroid disease  Interdisciplinary Rounds: attended  General Information Comments: Pt's diet advanced. Tolerating >75% of meals. Pt reports adequate po intake PTA, no weight loss and baseline appetite. No indications for malnutrition. Pt does not like Boost, duran d/c.  Nutrition Discharge Planning: adequate po intake for optimal nutrition    Nutrition Risk Screen    Nutrition Risk Screen: no indicators present    Nutrition/Diet History    Spiritual, Cultural Beliefs, Synagogue Practices, Values that Affect Care: no  Factors Affecting Nutritional Intake: None identified at this time    Anthropometrics    Temp: 98.6 °F (37 °C)  Height Method: Stated  Height: 5' 7" (170.2 cm)  Height (inches): 67 in  Weight Method: Bed Scale  Weight: 77.1 kg (169 lb 15.6 oz)  Weight (lb): 169.98 lb  Ideal Body Weight (IBW), Female: 135 lb  % Ideal Body Weight, Female (lb): 125.19 lb  BMI (Calculated): 26.5  BMI Grade: 25 - 29.9 - overweight       Lab/Procedures/Meds    Pertinent Labs Reviewed: reviewed  Pertinent Medications Reviewed: reviewed  Pertinent Medications Comments: IVF, heparin    Estimated/Assessed Needs    Weight Used For Calorie Calculations: 77.1 kg (169 lb 15.6 oz)  Energy Calorie Requirements (kcal): 1671  Energy Need Method: Sampson-St Jeor(PAL 1.25)  Protein Requirements: 77-93g(1.0-1.2g/kg)  Weight Used For Protein Calculations: 77.1 kg (169 lb 15.6 oz)  Fluid Requirements (mL): 1mL/kcal or per MD     RDA " Method (mL): 1671         Nutrition Prescription Ordered    Current Diet Order: Regular    Evaluation of Received Nutrient/Fluid Intake    IV Fluid (mL): 1800  % Intake of Estimated Energy Needs: 75 - 100 %  % Meal Intake: 75 - 100 %    Nutrition Risk    Level of Risk/Frequency of Follow-up: (f/u 1x/week)     Assessment and Plan      Nutrition Problem  Inadequate energy intake     Related to (etiology):   NPO     Signs and Symptoms (as evidenced by):   Pt receiving <85% EEN and EPN.      Intervention:  Collaboration of nutrition care     Nutrition Diagnosis Status:   Resolved           Monitor and Evaluation    Food and Nutrient Intake: energy intake, food and beverage intake  Food and Nutrient Adminstration: diet order  Anthropometric Measurements: weight, weight change, body mass index  Biochemical Data, Medical Tests and Procedures: electrolyte and renal panel, gastrointestinal profile, glucose/endocrine profile, inflammatory profile, lipid profile  Nutrition-Focused Physical Findings: overall appearance     Nutrition Follow-Up    RD Follow-up?: Yes

## 2019-04-23 NOTE — PROGRESS NOTES
Patient's chart was reviewed by a stroke team provider.  Patient Patient is neurologically stable. NAEON. Continuing vasospasm watch, permissive HTN, matching I/Os.  TCDs so far have been negative for vasospasm  There is no new imaging to review.  Pending diagnostics to follow up on include: Daily TCDs.    Continue Nimotop 60q4 x 21 days, and daily TCDs x 14 days, and Keppra 500bid x 7 days    For other recommendations please see our previous note completed on: 4/22/19    There are no new recommendations at this time. Will continue to follow. Discussed patient with staff. Please contact stroke team for any questions or concerns.       Real Culp M.D.  PGY-1 Internal Medicine  Pager: [phone]

## 2019-04-23 NOTE — PLAN OF CARE
Problem: Adult Inpatient Plan of Care  Goal: Plan of Care Review  Outcome: Ongoing (interventions implemented as appropriate)  Plan of care reviewed with pt.All questions and concerns addressed.No acute distress noted.

## 2019-04-23 NOTE — ASSESSMENT & PLAN NOTE
Cardiac Monitoring  Permissive HTN, SBP goal  <200   Prn hydralazine   Echo; EF 60% mild TR; Pulm. htn

## 2019-04-24 LAB
ALBUMIN SERPL BCP-MCNC: 3.2 G/DL (ref 3.5–5.2)
ALP SERPL-CCNC: 66 U/L (ref 55–135)
ALT SERPL W/O P-5'-P-CCNC: 70 U/L (ref 10–44)
ANION GAP SERPL CALC-SCNC: 10 MMOL/L (ref 8–16)
APTT BLDCRRT: 22.8 SEC (ref 21–32)
AST SERPL-CCNC: 55 U/L (ref 10–40)
BASOPHILS # BLD AUTO: 0.04 K/UL (ref 0–0.2)
BASOPHILS NFR BLD: 0.3 % (ref 0–1.9)
BILIRUB SERPL-MCNC: 0.3 MG/DL (ref 0.1–1)
BUN SERPL-MCNC: 11 MG/DL (ref 8–23)
CALCIUM SERPL-MCNC: 8.7 MG/DL (ref 8.7–10.5)
CHLORIDE SERPL-SCNC: 107 MMOL/L (ref 95–110)
CO2 SERPL-SCNC: 20 MMOL/L (ref 23–29)
CREAT SERPL-MCNC: 0.8 MG/DL (ref 0.5–1.4)
DIFFERENTIAL METHOD: ABNORMAL
EOSINOPHIL # BLD AUTO: 0 K/UL (ref 0–0.5)
EOSINOPHIL NFR BLD: 0.2 % (ref 0–8)
ERYTHROCYTE [DISTWIDTH] IN BLOOD BY AUTOMATED COUNT: 13.2 % (ref 11.5–14.5)
EST. GFR  (AFRICAN AMERICAN): >60 ML/MIN/1.73 M^2
EST. GFR  (NON AFRICAN AMERICAN): >60 ML/MIN/1.73 M^2
GLUCOSE SERPL-MCNC: 144 MG/DL (ref 70–110)
HCT VFR BLD AUTO: 37.7 % (ref 37–48.5)
HGB BLD-MCNC: 13 G/DL (ref 12–16)
IMM GRANULOCYTES # BLD AUTO: 0.3 K/UL (ref 0–0.04)
IMM GRANULOCYTES NFR BLD AUTO: 2.3 % (ref 0–0.5)
INR PPP: 1 (ref 0.8–1.2)
LYMPHOCYTES # BLD AUTO: 1.4 K/UL (ref 1–4.8)
LYMPHOCYTES NFR BLD: 10.3 % (ref 18–48)
MAGNESIUM SERPL-MCNC: 2 MG/DL (ref 1.6–2.6)
MCH RBC QN AUTO: 30.4 PG (ref 27–31)
MCHC RBC AUTO-ENTMCNC: 34.5 G/DL (ref 32–36)
MCV RBC AUTO: 88 FL (ref 82–98)
MONOCYTES # BLD AUTO: 1.1 K/UL (ref 0.3–1)
MONOCYTES NFR BLD: 7.9 % (ref 4–15)
NEUTROPHILS # BLD AUTO: 10.5 K/UL (ref 1.8–7.7)
NEUTROPHILS NFR BLD: 79 % (ref 38–73)
NRBC BLD-RTO: 0 /100 WBC
PHOSPHATE SERPL-MCNC: 3.1 MG/DL (ref 2.7–4.5)
PLATELET # BLD AUTO: 284 K/UL (ref 150–350)
PMV BLD AUTO: 9.9 FL (ref 9.2–12.9)
POTASSIUM SERPL-SCNC: 3.8 MMOL/L (ref 3.5–5.1)
PROT SERPL-MCNC: 6.2 G/DL (ref 6–8.4)
PROTHROMBIN TIME: 10 SEC (ref 9–12.5)
RBC # BLD AUTO: 4.28 M/UL (ref 4–5.4)
SODIUM SERPL-SCNC: 137 MMOL/L (ref 136–145)
WBC # BLD AUTO: 13.23 K/UL (ref 3.9–12.7)

## 2019-04-24 PROCEDURE — 25000003 PHARM REV CODE 250: Performed by: NURSE PRACTITIONER

## 2019-04-24 PROCEDURE — 63600175 PHARM REV CODE 636 W HCPCS: Performed by: NURSE PRACTITIONER

## 2019-04-24 PROCEDURE — 97116 GAIT TRAINING THERAPY: CPT

## 2019-04-24 PROCEDURE — 20000000 HC ICU ROOM

## 2019-04-24 PROCEDURE — 94761 N-INVAS EAR/PLS OXIMETRY MLT: CPT

## 2019-04-24 PROCEDURE — 99291 CRITICAL CARE FIRST HOUR: CPT | Mod: ,,, | Performed by: PSYCHIATRY & NEUROLOGY

## 2019-04-24 PROCEDURE — 25000003 PHARM REV CODE 250: Performed by: PSYCHIATRY & NEUROLOGY

## 2019-04-24 PROCEDURE — 85025 COMPLETE CBC W/AUTO DIFF WBC: CPT

## 2019-04-24 PROCEDURE — 83735 ASSAY OF MAGNESIUM: CPT

## 2019-04-24 PROCEDURE — 99291 PR CRITICAL CARE, E/M 30-74 MINUTES: ICD-10-PCS | Mod: ,,, | Performed by: PSYCHIATRY & NEUROLOGY

## 2019-04-24 PROCEDURE — 84100 ASSAY OF PHOSPHORUS: CPT

## 2019-04-24 PROCEDURE — 80053 COMPREHEN METABOLIC PANEL: CPT

## 2019-04-24 RX ADMIN — METHYLPREDNISOLONE SODIUM SUCCINATE 60 MG: 125 INJECTION, POWDER, FOR SOLUTION INTRAMUSCULAR; INTRAVENOUS at 02:04

## 2019-04-24 RX ADMIN — HEPARIN SODIUM 5000 UNITS: 5000 INJECTION, SOLUTION INTRAVENOUS; SUBCUTANEOUS at 09:04

## 2019-04-24 RX ADMIN — PANTOPRAZOLE SODIUM 40 MG: 40 TABLET, DELAYED RELEASE ORAL at 09:04

## 2019-04-24 RX ADMIN — NIMODIPINE 60 MG: 30 CAPSULE, LIQUID FILLED ORAL at 01:04

## 2019-04-24 RX ADMIN — NIMODIPINE 60 MG: 30 CAPSULE, LIQUID FILLED ORAL at 06:04

## 2019-04-24 RX ADMIN — NIMODIPINE 60 MG: 30 CAPSULE, LIQUID FILLED ORAL at 05:04

## 2019-04-24 RX ADMIN — NIMODIPINE 60 MG: 30 CAPSULE, LIQUID FILLED ORAL at 09:04

## 2019-04-24 RX ADMIN — HEPARIN SODIUM 5000 UNITS: 5000 INJECTION, SOLUTION INTRAVENOUS; SUBCUTANEOUS at 05:04

## 2019-04-24 RX ADMIN — NIMODIPINE 60 MG: 30 CAPSULE, LIQUID FILLED ORAL at 02:04

## 2019-04-24 RX ADMIN — STANDARDIZED SENNA CONCENTRATE AND DOCUSATE SODIUM 1 TABLET: 8.6; 5 TABLET, FILM COATED ORAL at 09:04

## 2019-04-24 RX ADMIN — HEPARIN SODIUM 5000 UNITS: 5000 INJECTION, SOLUTION INTRAVENOUS; SUBCUTANEOUS at 02:04

## 2019-04-24 RX ADMIN — NIMODIPINE 60 MG: 30 CAPSULE, LIQUID FILLED ORAL at 10:04

## 2019-04-24 RX ADMIN — ATORVASTATIN CALCIUM 10 MG: 10 TABLET, FILM COATED ORAL at 09:04

## 2019-04-24 RX ADMIN — ACETAMINOPHEN 650 MG: 325 TABLET ORAL at 02:04

## 2019-04-24 RX ADMIN — LEVOTHYROXINE SODIUM 100 MCG: 50 TABLET ORAL at 05:04

## 2019-04-24 NOTE — SUBJECTIVE & OBJECTIVE
Interval History:  No acute events overnight. Tylenol PRN for headache. Vital stable    Review of Systems   Constitutional: Negative for activity change, diaphoresis, fatigue and fever.   Eyes: Negative for photophobia, pain, discharge and visual disturbance.   Respiratory: Negative for cough, chest tightness, shortness of breath and wheezing.    Cardiovascular: Negative for chest pain, palpitations and leg swelling.   Neurological: Negative for dizziness, seizures, facial asymmetry, speech difficulty, weakness and headaches.       Objective:     Vitals:  Temp: 98.4 °F (36.9 °C)  Pulse: 65  Rhythm: normal sinus rhythm  BP: (!) 167/79  MAP (mmHg): 114  Resp: (!) 25  SpO2: 99 %  O2 Device (Oxygen Therapy): room air    Temp  Min: 98.4 °F (36.9 °C)  Max: 98.9 °F (37.2 °C)  Pulse  Min: 53  Max: 74  BP  Min: 134/76  Max: 190/82  MAP (mmHg)  Min: 94  Max: 125  Resp  Min: 14  Max: 27  SpO2  Min: 94 %  Max: 99 %    04/23 0701 - 04/24 0700  In: 2610 [P.O.:810; I.V.:1800]  Out: 2550 [Urine:2550]   Unmeasured Output  Urine Occurrence: 1  Stool Occurrence: 1  Pad Count: 1       Physical Exam  Constitutional: Well-nourished and -developed. No apparent distress.   Eyes: Conjunctiva clear, anicteric. Lids no lesions.  Head/Ears/Nose/Mouth/Throat/Neck: Moist mucous membranes. External ears, nose atraumatic.   Cardiovascular: Regular rhythm. No murmurs. No leg edema.  Respiratory: Comfortable respirations. Clear to auscultation.  Gastrointestinal: No hernia. Soft, nondistended, nontender. + bowel sounds.     Neurologic:  -GCS E4V5M6  -Alert. Oriented to person, place, and time. Speech fluent. Follows commands.  -Cranial nerves II-XII intact  -Motor 5/5 throughout   -Sensation grossly intact  -PERRL    Medications:  Continuous  sodium chloride 0.9% Last Rate: 75 mL/hr at 04/24/19 1300   Scheduled  atorvastatin 10 mg Daily   heparin (porcine) 5,000 Units Q8H   levothyroxine 100 mcg Before breakfast   niMODipine 60 mg Q4H   pantoprazole  40 mg Daily   polyethylene glycol 17 g Daily   senna-docusate 8.6-50 mg 1 tablet BID   PRN  acetaminophen 650 mg Q6H PRN   fentaNYL 12.5 mcg Daily PRN   hydrALAZINE 10 mg Q4H PRN   magnesium oxide 800 mg PRN   magnesium oxide 800 mg PRN   methylPREDNISolone sodium succinate 60 mg Q8H PRN   ondansetron 4 mg Q6H PRN   oxyCODONE 5 mg Q6H PRN   potassium, sodium phosphates 2 packet PRN   potassium, sodium phosphates 2 packet PRN   potassium, sodium phosphates 2 packet PRN   sodium chloride 0.9% 10 mL PRN   sodium chloride 0.9% 10 mL PRN     Today I personally reviewed pertinent medications, lines/drains/airways, imaging, cardiology results, notably:    Diet  Diet Adult Regular (IDDSI Level 7) [facility]; Thin  Diet Adult Regular (IDDSI Level 7) [facility]; Thin

## 2019-04-24 NOTE — PLAN OF CARE
Problem: Adult Inpatient Plan of Care  Goal: Plan of Care Review  Outcome: Ongoing (interventions implemented as appropriate)  POC reviewed with pt at 0300. Pt verbalized understanding. Questions and concerns addressed. No acute events overnight. PRN Tylenol and methylprednisolone administered for headache. Pt progressing toward goals. Will continue to monitor. See flowsheets for full assessment and VS info.

## 2019-04-24 NOTE — ASSESSMENT & PLAN NOTE
As seen on Imaging  Q1 hr Neuro checks  Stat CT for changes in LOC  Monitor neuro exam and imaging  NSGY on board

## 2019-04-24 NOTE — ASSESSMENT & PLAN NOTE
S/p coiling of L ICA Aneurysm   PBD 8, PCD 7  Permissive HTN < 200  Nimodipine 60 mg q 4 h   Daily TCDs, no vasospasm noted   Goal euvolemia

## 2019-04-24 NOTE — PT/OT/SLP PROGRESS
"Physical Therapy Treatment    Patient Name:  Adriana Fonseca   MRN:  [id number]    Recommendations:     Discharge Recommendations:  home   Discharge Equipment Recommendations: none   Barriers to discharge: None    Assessment:     Adriana Fonseca is a 63 y.o. female admitted with a medical diagnosis of SAH (subarachnoid hemorrhage).  She presents with the following impairments/functional limitations:  impaired balance, impaired endurance. The patient shows improvement in dynamic balance and activity tolerance. She ambulated 400' with supervision assistance and no AD. Encouraged patient to ambulate 3x daily with RN and to sit up in chair, patient and RN in agreement with plan of care.     Rehab Prognosis: Good; patient would benefit from acute skilled PT services to address these deficits and reach maximum level of function.    Recent Surgery: Procedure(s) (LRB):  ANGIOGRAM-CEREBRAL (Bilateral) 7 Days Post-Op    Plan:     During this hospitalization, patient to be seen 2 x/week to address the identified rehab impairments via gait training, therapeutic activities, neuromuscular re-education, therapeutic exercises and progress toward the following goals:    · Plan of Care Expires:  05/18/19    Subjective     Chief Complaint: "I only have pain when they ultrasound my head, it feels like they're squeezing my brain"  Patient/Family Comments/goals: return home, "I walk around my room during the day and go to the bathroom with the RN, I sit up in the chair all day"  Pain/Comfort:  · Pain Rating 1: 0/10      Objective:     Communicated with RN prior to session.  Patient found supine with blood pressure cuff, pulse ox (continuous), peripheral IV, telemetry upon PT entry to room.     General Precautions: Standard, aspiration, fall   Orthopedic Precautions:N/A   Braces: N/A     Functional Mobility:    Bed Mobility  Supine to Sit:  moderate assistance    Transfers Sit to Stand:  supervision assistance   Gait  Gait Distance: 400 ft with " no AD  Assistance Level: supervision assistance  Description: reciprocal strides, decreased gait speed, increased time in double limb stance. Cues for posture.           AM-PAC 6 CLICK MOBILITY  Turning over in bed (including adjusting bedclothes, sheets and blankets)?: 4  Sitting down on and standing up from a chair with arms (e.g., wheelchair, bedside commode, etc.): 4  Moving from lying on back to sitting on the side of the bed?: 4  Moving to and from a bed to a chair (including a wheelchair)?: 4  Need to walk in hospital room?: 4  Climbing 3-5 steps with a railing?: 3  Basic Mobility Total Score: 23       Therapeutic Activities and Exercises:   Patient educated on role of therapy, goals of session, benefits of out of bed mobility. Patient agreeable to mobilize with therapy.  Performed dynamic head turns horizontally and vertically with no loss of balance, tandem gait forward and backward with mild unsteadiness. Patient encouraged to continue ambulating 3x/day with RN, sitting up in chair- she and RN are in agreement.     Patient left supine with all lines intact and call button in reach..    GOALS:   Multidisciplinary Problems     Physical Therapy Goals        Problem: Physical Therapy Goal    Goal Priority Disciplines Outcome Goal Variances Interventions   Physical Therapy Goal     PT, PT/OT Ongoing (interventions implemented as appropriate)     Description:  Goals to be met by: 5/3/2019     Patient will increase functional independence with mobility by performin. Supine to sit with [place]  2. Sit to supine with [place]  3. Sit to stand transfer with [place]  4. Gait  x 150 feet with Supervision using no AD- Met  Gait 500' with independent using no AD.   5. Stand for 5 minutes while performing dynamic balance exercises without AD, without loss of balance to decrease risk of falls.  6. Lower extremity exercise program x20 reps per handout, with independence                       Time  Tracking:     PT Received On: 04/24/19  PT Start Time: 1513     PT Stop Time: 1530  PT Total Time (min): 17 min     Billable Minutes: Gait Training 17       PT/PTA: PT           Mita Bahena, PT  04/24/2019

## 2019-04-24 NOTE — PROGRESS NOTES
[facility]  Neurocritical Care  Progress Note    Admit Date: 2019  Service Date: 2019  Length of Stay: 8    Subjective:     Chief Complaint: SAH (subarachnoid hemorrhage)    History of Present Illness: The patient is a 63-year-old female with PMH of HTN, HLD, Thyroid Disease, that presents as a transfer from [facility] ([facility]) for management of intracranial aneurysm.  Patient presented to [facility] ED with complaints of sudden onset severe headache with neck stiffness that began at 8am today. She acknowledges, headache, visual disturbances, nausea without vomiting, but denies numbness and tingling.  CT head and CTA brain were obtained. 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  There was no evidence of acute intracranial hemorrhage.   She  endorses having taken IBU but with no relief. Denies anticoagulant use. Of note,  patient's father  or intracerebral aneurysm at an early age.        Hospital Course:  Admit to NCC, Keppra, Nimitop, CTH, MRI   s/p Angio, ECHO, Permissive HTN   Solumedrol for HA, Start heparin, Start Diet   Boost added to diet, Solumedrol scheduled, Myralax, ABG    PBD 4 . No significant events over night. TCDs  With no vasospasms. Restarted home meds.   TCDs pending. No H/A today. wea keppra. EEG negative  TCDs from  - showed no evidence of vasospasms. No H/A today. No change in neuro exam. Bolus 500cc to keep I/O equal.  : NAEO    Interval History:  No acute events overnight. Tylenol PRN for headache. Vital stable    Review of Systems   Constitutional: Negative for activity change, diaphoresis, fatigue and fever.   Eyes: Negative for photophobia, pain, discharge and visual disturbance.   Respiratory: Negative for cough, chest tightness, shortness of breath and wheezing.    Cardiovascular: Negative for chest pain, palpitations and leg swelling.   Neurological: Negative for dizziness,  seizures, facial asymmetry, speech difficulty, weakness and headaches.       Objective:     Vitals:  Temp: 98.4 °F (36.9 °C)  Pulse: 65  Rhythm: normal sinus rhythm  BP: (!) 167/79  MAP (mmHg): 114  Resp: (!) 25  SpO2: 99 %  O2 Device (Oxygen Therapy): room air    Temp  Min: 98.4 °F (36.9 °C)  Max: 98.9 °F (37.2 °C)  Pulse  Min: 53  Max: 74  BP  Min: 134/76  Max: 190/82  MAP (mmHg)  Min: 94  Max: 125  Resp  Min: 14  Max: 27  SpO2  Min: 94 %  Max: 99 %    04/23 0701 - 04/24 0700  In: 2610 [P.O.:810; I.V.:1800]  Out: 2550 [Urine:2550]   Unmeasured Output  Urine Occurrence: 1  Stool Occurrence: 1  Pad Count: 1       Physical Exam  Constitutional: Well-nourished and -developed. No apparent distress.   Eyes: Conjunctiva clear, anicteric. Lids no lesions.  Head/Ears/Nose/Mouth/Throat/Neck: Moist mucous membranes. External ears, nose atraumatic.   Cardiovascular: Regular rhythm. No murmurs. No leg edema.  Respiratory: Comfortable respirations. Clear to auscultation.  Gastrointestinal: No hernia. Soft, nondistended, nontender. + bowel sounds.     Neurologic:  -GCS E4V5M6  -Alert. Oriented to person, place, and time. Speech fluent. Follows commands.  -Cranial nerves II-XII intact  -Motor 5/5 throughout   -Sensation grossly intact  -PERRL    Medications:  Continuous  sodium chloride 0.9% Last Rate: 75 mL/hr at 04/24/19 1300   Scheduled  atorvastatin 10 mg Daily   heparin (porcine) 5,000 Units Q8H   levothyroxine 100 mcg Before breakfast   niMODipine 60 mg Q4H   pantoprazole 40 mg Daily   polyethylene glycol 17 g Daily   senna-docusate 8.6-50 mg 1 tablet BID   PRN  acetaminophen 650 mg Q6H PRN   fentaNYL 12.5 mcg Daily PRN   hydrALAZINE 10 mg Q4H PRN   magnesium oxide 800 mg PRN   magnesium oxide 800 mg PRN   methylPREDNISolone sodium succinate 60 mg Q8H PRN   ondansetron 4 mg Q6H PRN   oxyCODONE 5 mg Q6H PRN   potassium, sodium phosphates 2 packet PRN   potassium, sodium phosphates 2 packet PRN   potassium, sodium phosphates  2 packet PRN   sodium chloride 0.9% 10 mL PRN   sodium chloride 0.9% 10 mL PRN     Today I personally reviewed pertinent medications, lines/drains/airways, imaging, cardiology results, notably:    Diet  Diet Adult Regular (IDDSI Level 7) [facility]; Thin  Diet Adult Regular (IDDSI Level 7) [facility]; Thin      Assessment/Plan:     Neuro  * SAH (subarachnoid hemorrhage)  PBD 8, PCD 7  Permissive HTN, SBP < 200  Nimodipine 60 mg q 4 h for 21 days  Daily TCDs, no vasospasm noted  Euvolemia  Keppra for seizure ppx 7/7 days      Vasogenic cerebral edema  As seen on Imaging  Q1 hr Neuro checks  Stat CT for changes in LOC  Monitor neuro exam and imaging  NSGY on board      Headache  Tylenol PRN and Oxycodone PRN  Methylprednisolone 60mg q8h prn, for HA and neck pain/stiffness    Cardiac/Vascular  Hyperlipidemia, mixed  Continue on atorvastatin 10mg daily    Essential hypertension  Cardiac Monitoring  Permissive HTN, SBP goal  <200   Prn hydralazine   Echo; EF 60% mild TR; Pulm. htn    Renal/  Compensated respiratory alkalosis  On RA O2 sats 96%  PRN ABG  Monitor Respiratory Response, currently No signs of distress    Endocrine  Thyroid disease  Hx of Hypothyroidism  TSH .684 and Free T4 1.32  Continue on Levothyroxine 100 mcg daily     GI  GERD without esophagitis  Continue on Pantoprazole 40 mg daily     Other  S/P coil embolization of cerebral aneurysm  S/p coiling of L ICA Aneurysm   PBD 8, PCD 7  Permissive HTN < 200  Nimodipine 60 mg q 4 h   Daily TCDs, no vasospasm noted   Goal euvolemia             The patient is being Prophylaxed for:  Venous Thromboembolism with: Mechanical and Heparin SQ  Stress Ulcer with: PPI  Ventilator Pneumonia with: none    Activity Orders          Diet Adult Regular (IDDSI Level 7) [facility]; Thin: Regular starting at 04/18 0707        Full Code    Lien Shafer MD  Neurocritical Care  [facility]

## 2019-04-24 NOTE — ASSESSMENT & PLAN NOTE
PBD 8, PCD 7  Permissive HTN, SBP < 200  Nimodipine 60 mg q 4 h for 21 days  Daily TCDs, no vasospasm noted  Euvolemia  Keppra for seizure ppx 7/7 days

## 2019-04-24 NOTE — PLAN OF CARE
Problem: Adult Inpatient Plan of Care  Goal: Plan of Care Review  Outcome: Ongoing (interventions implemented as appropriate)  Plan of care reviewed with pt.All questions and concerns addressed.Pt medicated as needed for headache.No acute distress noted.

## 2019-04-24 NOTE — PLAN OF CARE
Patient's chart was reviewed by a stroke team provider.  Patient Patient is neurologically stable. NAEON. Continuing vasospasm watch, permissive HTN, matching I/Os.      TCDs so far have been negative for vasospasm  There is no new imaging to review.  Pending diagnostics to follow up on include: Daily TCDs    Continue Nimotop 60q4 x 21 days, and daily TCDs x 14 days, and Keppra 500bid x 7 days (today day 7/7)    For other recommendations please see our previous note completed on: 4/22/19    There are no new recommendations at this time. Will continue to follow. Discussed patient with staff. Please contact stroke team for any questions or concerns.     Angeline Louie MD  Vascular Neurology \[facility] [place] [facility]\""

## 2019-04-25 LAB
ALBUMIN SERPL BCP-MCNC: 3.3 G/DL (ref 3.5–5.2)
ALP SERPL-CCNC: 66 U/L (ref 55–135)
ALT SERPL W/O P-5'-P-CCNC: 80 U/L (ref 10–44)
ANION GAP SERPL CALC-SCNC: 11 MMOL/L (ref 8–16)
AST SERPL-CCNC: 42 U/L (ref 10–40)
BASOPHILS # BLD AUTO: 0.03 K/UL (ref 0–0.2)
BASOPHILS NFR BLD: 0.2 % (ref 0–1.9)
BILIRUB SERPL-MCNC: 0.3 MG/DL (ref 0.1–1)
BUN SERPL-MCNC: 13 MG/DL (ref 8–23)
CALCIUM SERPL-MCNC: 8.6 MG/DL (ref 8.7–10.5)
CHLORIDE SERPL-SCNC: 106 MMOL/L (ref 95–110)
CO2 SERPL-SCNC: 21 MMOL/L (ref 23–29)
CREAT SERPL-MCNC: 0.8 MG/DL (ref 0.5–1.4)
DIFFERENTIAL METHOD: ABNORMAL
EOSINOPHIL # BLD AUTO: 0 K/UL (ref 0–0.5)
EOSINOPHIL NFR BLD: 0 % (ref 0–8)
ERYTHROCYTE [DISTWIDTH] IN BLOOD BY AUTOMATED COUNT: 13.3 % (ref 11.5–14.5)
EST. GFR  (AFRICAN AMERICAN): >60 ML/MIN/1.73 M^2
EST. GFR  (NON AFRICAN AMERICAN): >60 ML/MIN/1.73 M^2
GLUCOSE SERPL-MCNC: 127 MG/DL (ref 70–110)
HCT VFR BLD AUTO: 39.2 % (ref 37–48.5)
HGB BLD-MCNC: 12.8 G/DL (ref 12–16)
IMM GRANULOCYTES # BLD AUTO: 0.24 K/UL (ref 0–0.04)
IMM GRANULOCYTES NFR BLD AUTO: 2 % (ref 0–0.5)
LYMPHOCYTES # BLD AUTO: 1.3 K/UL (ref 1–4.8)
LYMPHOCYTES NFR BLD: 10.2 % (ref 18–48)
MAGNESIUM SERPL-MCNC: 2 MG/DL (ref 1.6–2.6)
MCH RBC QN AUTO: 29.8 PG (ref 27–31)
MCHC RBC AUTO-ENTMCNC: 32.7 G/DL (ref 32–36)
MCV RBC AUTO: 91 FL (ref 82–98)
MONOCYTES # BLD AUTO: 0.8 K/UL (ref 0.3–1)
MONOCYTES NFR BLD: 6.1 % (ref 4–15)
NEUTROPHILS # BLD AUTO: 10 K/UL (ref 1.8–7.7)
NEUTROPHILS NFR BLD: 81.5 % (ref 38–73)
NRBC BLD-RTO: 0 /100 WBC
PHOSPHATE SERPL-MCNC: 3.8 MG/DL (ref 2.7–4.5)
PLATELET # BLD AUTO: 292 K/UL (ref 150–350)
PMV BLD AUTO: 9.7 FL (ref 9.2–12.9)
POTASSIUM SERPL-SCNC: 4 MMOL/L (ref 3.5–5.1)
PROT SERPL-MCNC: 6.3 G/DL (ref 6–8.4)
RBC # BLD AUTO: 4.3 M/UL (ref 4–5.4)
SODIUM SERPL-SCNC: 138 MMOL/L (ref 136–145)
WBC # BLD AUTO: 12.23 K/UL (ref 3.9–12.7)

## 2019-04-25 PROCEDURE — 25000003 PHARM REV CODE 250: Performed by: PSYCHIATRY & NEUROLOGY

## 2019-04-25 PROCEDURE — 63600175 PHARM REV CODE 636 W HCPCS: Performed by: NURSE PRACTITIONER

## 2019-04-25 PROCEDURE — 25000003 PHARM REV CODE 250: Performed by: NURSE PRACTITIONER

## 2019-04-25 PROCEDURE — 94761 N-INVAS EAR/PLS OXIMETRY MLT: CPT

## 2019-04-25 PROCEDURE — 20000000 HC ICU ROOM

## 2019-04-25 PROCEDURE — 99291 CRITICAL CARE FIRST HOUR: CPT | Mod: ,,, | Performed by: PSYCHIATRY & NEUROLOGY

## 2019-04-25 PROCEDURE — 99291 PR CRITICAL CARE, E/M 30-74 MINUTES: ICD-10-PCS | Mod: ,,, | Performed by: PSYCHIATRY & NEUROLOGY

## 2019-04-25 RX ADMIN — NIMODIPINE 60 MG: 30 CAPSULE, LIQUID FILLED ORAL at 01:04

## 2019-04-25 RX ADMIN — NIMODIPINE 60 MG: 30 CAPSULE, LIQUID FILLED ORAL at 05:04

## 2019-04-25 RX ADMIN — LEVOTHYROXINE SODIUM 100 MCG: 50 TABLET ORAL at 05:04

## 2019-04-25 RX ADMIN — HEPARIN SODIUM 5000 UNITS: 5000 INJECTION, SOLUTION INTRAVENOUS; SUBCUTANEOUS at 01:04

## 2019-04-25 RX ADMIN — ACETAMINOPHEN 650 MG: 325 TABLET ORAL at 12:04

## 2019-04-25 RX ADMIN — ATORVASTATIN CALCIUM 10 MG: 10 TABLET, FILM COATED ORAL at 08:04

## 2019-04-25 RX ADMIN — NIMODIPINE 60 MG: 30 CAPSULE, LIQUID FILLED ORAL at 09:04

## 2019-04-25 RX ADMIN — ACETAMINOPHEN 650 MG: 325 TABLET ORAL at 06:04

## 2019-04-25 RX ADMIN — HEPARIN SODIUM 5000 UNITS: 5000 INJECTION, SOLUTION INTRAVENOUS; SUBCUTANEOUS at 09:04

## 2019-04-25 RX ADMIN — OXYCODONE HYDROCHLORIDE 5 MG: 5 TABLET ORAL at 09:04

## 2019-04-25 RX ADMIN — PANTOPRAZOLE SODIUM 40 MG: 40 TABLET, DELAYED RELEASE ORAL at 08:04

## 2019-04-25 RX ADMIN — ACETAMINOPHEN 650 MG: 325 TABLET ORAL at 05:04

## 2019-04-25 RX ADMIN — HEPARIN SODIUM 5000 UNITS: 5000 INJECTION, SOLUTION INTRAVENOUS; SUBCUTANEOUS at 05:04

## 2019-04-25 NOTE — PLAN OF CARE
Problem: Adult Inpatient Plan of Care  Goal: Plan of Care Review  POC reviewed with pt. Pt verbalized understanding. Questions and concerns addressed. No acute events today. Pt safely ambulated to bathroom and throughout room during shift with minimal assistance. Pt headache managed with tylenol. MD discussed with pt possibly discharging pt out icu tomorrow to the floor. Pt progressing toward goals. Will continue to monitor. See flowsheets for full assessment and VS info.

## 2019-04-25 NOTE — PROGRESS NOTES
[facility]  Neurocritical Care  Progress Note    Admit Date: 2019  Service Date: 2019  Length of Stay: 9    Subjective:     Chief Complaint: SAH (subarachnoid hemorrhage)    History of Present Illness: The patient is a 63-year-old female with PMH of HTN, HLD, Thyroid Disease, that presents as a transfer from [facility] ([facility]) for management of intracranial aneurysm.  Patient presented to [facility] ED with complaints of sudden onset severe headache with neck stiffness that began at 8am today. She acknowledges, headache, visual disturbances, nausea without vomiting, but denies numbness and tingling.  CT head and CTA brain were obtained. 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  There was no evidence of acute intracranial hemorrhage.   She  endorses having taken IBU but with no relief. Denies anticoagulant use. Of note,  patient's father  or intracerebral aneurysm at an early age.        Hospital Course:  Admit to NCC, Keppra, Nimitop, CTH, MRI   s/p Angio, ECHO, Permissive HTN   Solumedrol for HA, Start heparin, Start Diet   Boost added to diet, Solumedrol scheduled, Myralax, ABG    PBD 4 . No significant events over night. TCDs  With no vasospasms. Restarted home meds.   TCDs pending. No H/A today. wea keppra. EEG negative  TCDs from  - showed no evidence of vasospasms. No H/A today. No change in neuro exam. Bolus 500cc to keep I/O equal.  : NAEO  : Stable, mild headache today, TCD negative for vasospasms    Interval History:  No acute events overnight. Vital stables.     Review of Systems  Constitutional: Negative for activity change, diaphoresis, fatigue and fever.   Eyes: Negative for photophobia, pain, discharge and visual disturbance.   Respiratory: Negative for cough, chest tightness, shortness of breath and wheezing.    Cardiovascular: Negative for chest pain, palpitations and leg swelling.    Neurological: Negative for dizziness, seizures, facial asymmetry, speech difficulty, weakness. Mild headache      Objective:     Vitals:  Temp: 98.6 °F (37 °C)  Pulse: 60  Rhythm: normal sinus rhythm  BP: (!) 146/70  MAP (mmHg): 101  Resp: 17  SpO2: 95 %  O2 Device (Oxygen Therapy): room air    Temp  Min: 98.4 °F (36.9 °C)  Max: 98.7 °F (37.1 °C)  Pulse  Min: 54  Max: 76  BP  Min: 130/61  Max: 182/83  MAP (mmHg)  Min: 88  Max: 122  Resp  Min: 12  Max: 28  SpO2  Min: 94 %  Max: 99 %    04/24 0701 - 04/25 0700  In: 2623.8 [P.O.:750; I.V.:1873.8]  Out: 2150 [Urine:2150]   Unmeasured Output  Urine Occurrence: 1  Stool Occurrence: 0  Pad Count: 1       Physical Exam  Constitutional: Well-nourished and -developed. No apparent distress.   Eyes: Conjunctiva clear, anicteric. Lids no lesions.  Head/Ears/Nose/Mouth/Throat/Neck: Moist mucous membranes. External ears, nose atraumatic.   Cardiovascular: Regular rhythm. No murmurs. No leg edema.  Respiratory: Comfortable respirations. Clear to auscultation.  Gastrointestinal: No hernia. Soft, nondistended, nontender. + bowel sounds.     Neurologic:  -GCS E4V5M6  -Alert. Oriented to person, place, and time. Speech fluent. Follows commands.  -Cranial nerves II-XII intact  -Motor 5/5 throughout   -Sensation grossly intact  -PERRL        Medications:  Continuous  sodium chloride 0.9% Last Rate: 75 mL/hr at 04/25/19 1100   Scheduled  atorvastatin 10 mg Daily   heparin (porcine) 5,000 Units Q8H   levothyroxine 100 mcg Before breakfast   niMODipine 60 mg Q4H   pantoprazole 40 mg Daily   polyethylene glycol 17 g Daily   senna-docusate 8.6-50 mg 1 tablet BID   PRN  acetaminophen 650 mg Q6H PRN   fentaNYL 12.5 mcg Daily PRN   hydrALAZINE 10 mg Q4H PRN   magnesium oxide 800 mg PRN   magnesium oxide 800 mg PRN   methylPREDNISolone sodium succinate 60 mg Q8H PRN   ondansetron 4 mg Q6H PRN   oxyCODONE 5 mg Q6H PRN   potassium, sodium phosphates 2 packet PRN   potassium, sodium phosphates 2  packet PRN   potassium, sodium phosphates 2 packet PRN   sodium chloride 0.9% 10 mL PRN   sodium chloride 0.9% 10 mL PRN     Today I personally reviewed pertinent medications, lines/drains/airways, imaging, cardiology results, notably:    Diet  Diet Adult Regular (IDDSI Level 7) [facility]; Thin  Diet Adult Regular (IDDSI Level 7) [facility]; Thin      Assessment/Plan:     Neuro  * SAH (subarachnoid hemorrhage)  PBD 9, PCD 8  Permissive HTN, SBP < 200  Nimodipine 60 mg q 4 h for 21 days  Daily TCDs, no vasospasm noted  Euvolemia, euglycemia, eunatremia  Keppra for seizure ppx 7/7 days (Completed )       Vasogenic cerebral edema  As seen on Imaging  Q1 hr Neuro checks  Stat CT for changes in LOC  Monitor neuro exam and imaging  NSGY on board      Headache  Tylenol PRN and Oxycodone PRN  Methylprednisolone 60mg q8h prn, for HA and neck pain/stiffness    Cardiac/Vascular  Hyperlipidemia, mixed  Continue on atorvastatin 10mg daily    Essential hypertension  Cardiac Monitoring  Permissive HTN, SBP goal  <200   Prn hydralazine   Echo; EF 60% mild TR; Pulm. htn    Endocrine  Thyroid disease  Hx of Hypothyroidism  TSH .684 and Free T4 1.32  Continue on Levothyroxine 100 mcg daily     GI  GERD without esophagitis  Continue on Pantoprazole 40 mg daily     Other  S/P coil embolization of cerebral aneurysm  S/p coiling of L ICA Aneurysm   PBD 9, PCD 8  Permissive HTN < 200  Nimodipine 60 mg q 4 h started 4/16 day 9  Daily TCDs, no vasospasm noted   Goal euvolemia             The patient is being Prophylaxed for:  Venous Thromboembolism with: Chemical  Stress Ulcer with: PPI  Ventilator Pneumonia with: none    Activity Orders          Diet Adult Regular (IDDSI Level 7) [facility]; Thin: Regular starting at 04/18 0707        Full Code    Lien Shafer MD  Neurocritical Care  [facility]

## 2019-04-25 NOTE — SUBJECTIVE & OBJECTIVE
Interval History:  No acute events overnight. Vital stables.     Review of Systems  Constitutional: Negative for activity change, diaphoresis, fatigue and fever.   Eyes: Negative for photophobia, pain, discharge and visual disturbance.   Respiratory: Negative for cough, chest tightness, shortness of breath and wheezing.    Cardiovascular: Negative for chest pain, palpitations and leg swelling.   Neurological: Negative for dizziness, seizures, facial asymmetry, speech difficulty, weakness. Mild headache      Objective:     Vitals:  Temp: 98.6 °F (37 °C)  Pulse: 60  Rhythm: normal sinus rhythm  BP: (!) 146/70  MAP (mmHg): 101  Resp: 17  SpO2: 95 %  O2 Device (Oxygen Therapy): room air    Temp  Min: 98.4 °F (36.9 °C)  Max: 98.7 °F (37.1 °C)  Pulse  Min: 54  Max: 76  BP  Min: 130/61  Max: 182/83  MAP (mmHg)  Min: 88  Max: 122  Resp  Min: 12  Max: 28  SpO2  Min: 94 %  Max: 99 %    04/24 0701 - 04/25 0700  In: 2623.8 [P.O.:750; I.V.:1873.8]  Out: 2150 [Urine:2150]   Unmeasured Output  Urine Occurrence: 1  Stool Occurrence: 0  Pad Count: 1       Physical Exam  Constitutional: Well-nourished and -developed. No apparent distress.   Eyes: Conjunctiva clear, anicteric. Lids no lesions.  Head/Ears/Nose/Mouth/Throat/Neck: Moist mucous membranes. External ears, nose atraumatic.   Cardiovascular: Regular rhythm. No murmurs. No leg edema.  Respiratory: Comfortable respirations. Clear to auscultation.  Gastrointestinal: No hernia. Soft, nondistended, nontender. + bowel sounds.     Neurologic:  -GCS E4V5M6  -Alert. Oriented to person, place, and time. Speech fluent. Follows commands.  -Cranial nerves II-XII intact  -Motor 5/5 throughout   -Sensation grossly intact  -PERRL        Medications:  Continuous  sodium chloride 0.9% Last Rate: 75 mL/hr at 04/25/19 1100   Scheduled  atorvastatin 10 mg Daily   heparin (porcine) 5,000 Units Q8H   levothyroxine 100 mcg Before breakfast   niMODipine 60 mg Q4H   pantoprazole 40 mg Daily    polyethylene glycol 17 g Daily   senna-docusate 8.6-50 mg 1 tablet BID   PRN  acetaminophen 650 mg Q6H PRN   fentaNYL 12.5 mcg Daily PRN   hydrALAZINE 10 mg Q4H PRN   magnesium oxide 800 mg PRN   magnesium oxide 800 mg PRN   methylPREDNISolone sodium succinate 60 mg Q8H PRN   ondansetron 4 mg Q6H PRN   oxyCODONE 5 mg Q6H PRN   potassium, sodium phosphates 2 packet PRN   potassium, sodium phosphates 2 packet PRN   potassium, sodium phosphates 2 packet PRN   sodium chloride 0.9% 10 mL PRN   sodium chloride 0.9% 10 mL PRN     Today I personally reviewed pertinent medications, lines/drains/airways, imaging, cardiology results, notably:    Diet  Diet Adult Regular (IDDSI Level 7) [facility]; Thin  Diet Adult Regular (IDDSI Level 7) [facility]; Thin

## 2019-04-25 NOTE — ASSESSMENT & PLAN NOTE
PBD 9, PCD 8  Permissive HTN, SBP < 200  Nimodipine 60 mg q 4 h for 21 days  Daily TCDs, no vasospasm noted  Euvolemia, euglycemia, eunatremia  Keppra for seizure ppx 7/7 days (Completed )

## 2019-04-25 NOTE — SUBJECTIVE & OBJECTIVE
Interval History:     Intact on exam and no issues. TCD without sonographic evidence for vasospasm    Medications:  Continuous Infusions:   sodium chloride 0.9% 75 mL/hr at 04/25/19 1700     Scheduled Meds:   atorvastatin  10 mg Oral Daily    heparin (porcine)  5,000 Units Subcutaneous Q8H    levothyroxine  100 mcg Oral Before breakfast    niMODipine  60 mg Oral Q4H    polyethylene glycol  17 g Oral Daily    senna-docusate 8.6-50 mg  1 tablet Oral BID     PRN Meds:acetaminophen, fentaNYL, hydrALAZINE, magnesium oxide, magnesium oxide, methylPREDNISolone sodium succinate, ondansetron, oxyCODONE, potassium, sodium phosphates, potassium, sodium phosphates, potassium, sodium phosphates, sodium chloride 0.9%, sodium chloride 0.9%     Review of Systems    Objective:     Weight: 77.1 kg (169 lb 15.6 oz)  Body mass index is 26.62 kg/m².  Vital Signs (Most Recent):  Temp: 97.5 °F (36.4 °C) (04/25/19 1502)  Pulse: 75 (04/25/19 1702)  Resp: (!) 36 (04/25/19 1702)  BP: 138/69 (04/25/19 1702)  SpO2: 97 % (04/25/19 1702) Vital Signs (24h Range):  Temp:  [97.5 °F (36.4 °C)-98.7 °F (37.1 °C)] 97.5 °F (36.4 °C)  Pulse:  [54-76] 75  Resp:  [12-36] 36  SpO2:  [94 %-98 %] 97 %  BP: (130-182)/() 138/69     Date 04/25/19 0700 - 04/26/19 0659   Shift 5175-4301 1016-7138 8467-6072 24 Hour Total   INTAKE   P.O. 200   200   I.V.(mL/kg) 598.8(7.8) 225(2.9)  823.8(10.7)   Shift Total(mL/kg) 798.8(10.4) 225(2.9)  1023.8(13.3)   OUTPUT   Urine(mL/kg/hr) 700(1.1) 250  950   Shift Total(mL/kg) 700(9.1) 250(3.2)  950(12.3)   Weight (kg) 77.1 77.1 77.1 77.1       Neurosurgery Physical Exam  E4V5M6  Face symm  PERRLA  TM, CN 2-12 grossly normal  MACK, FCX4  Groin soft       Significant Labs:  Recent Labs   Lab 04/23/19 2329 04/24/19  2356   * 127*    138   K 3.8 4.0    106   CO2 20* 21*   BUN 11 13   CREATININE 0.8 0.8   CALCIUM 8.7 8.6*   MG 2.0 2.0     Recent Labs   Lab 04/23/19 2329 04/24/19  2356   WBC 13.23*  12.23   HGB 13.0 12.8   HCT 37.7 39.2    292

## 2019-04-25 NOTE — ASSESSMENT & PLAN NOTE
63 year old female with a PMHx HTN, hypothyroidism presents as a transfer with a HH2 F4 SAH due to left hypophyseal saccular aneurysm, now s/p coil embolization on 4/17.    -Patient neurologically stable on exam  -Maintain head of bed > 30 degrees at all times  -Completed Keppra 500 BID x 7d for seizure prevention  -Continue TCDs daily x 14 days to monitor for vasospasm  -Continue Nimotop 60 mg q 4 hours x 21d for vasospasm prevention  -Continue permissive hypertension. Maintain SBP <200  -Maintain Na >135  -Strict I/O, net even to +500  -Daily PT/OT  -Notify NSGY immediately if any neurostatus change  -Will continue to follow

## 2019-04-25 NOTE — PROGRESS NOTES
[facility]  Neurosurgery  Progress Note    Subjective:     History of Present Illness: 63 year old female with PMHx HTN, hypothyroidism presents as a transfer from [facility] for evaluation of left ICA saccular aneurysm. Patient's family at bedside providing majority of history. Patient reports severe posterior headache, neck pain, photophobia starting at 930am. Subsequently brought to the ED by her family. Denies weakness, numbness, paresthesias, seizure activity, LOC, falls/trauma. Family at bedside state patient's father  in his 30s of intracerebral aneurysmal rupture. Transferred to [facility] for higher level of care. Denies use of anticoagulants or antiplatelet therapy. Reports compliance with BP medication however family states SBP is usually ~150s at home. Review of coags at OSH all within normal limits: aPTT 28, INR 0.9, PT 12.7.    Post-Op Info:  Procedure(s) (LRB):  ANGIOGRAM-CEREBRAL (Bilateral)   8 Days Post-Op     Interval History:     Intact on exam and no issues. TCD without sonographic evidence for vasospasm    Medications:  Continuous Infusions:   sodium chloride 0.9% 75 mL/hr at 19 1700     Scheduled Meds:   atorvastatin  10 mg Oral Daily    heparin (porcine)  5,000 Units Subcutaneous Q8H    levothyroxine  100 mcg Oral Before breakfast    niMODipine  60 mg Oral Q4H    polyethylene glycol  17 g Oral Daily    senna-docusate 8.6-50 mg  1 tablet Oral BID     PRN Meds:acetaminophen, fentaNYL, hydrALAZINE, magnesium oxide, magnesium oxide, methylPREDNISolone sodium succinate, ondansetron, oxyCODONE, potassium, sodium phosphates, potassium, sodium phosphates, potassium, sodium phosphates, sodium chloride 0.9%, sodium chloride 0.9%     Review of Systems    Objective:     Weight: 77.1 kg (169 lb 15.6 oz)  Body mass index is 26.62 kg/m².  Vital Signs (Most Recent):  Temp: 97.5 °F (36.4 °C) (19 1502)  Pulse: 75 (19 170)  Resp: (!) 36 (19)  BP:  138/69 (04/25/19 1702)  SpO2: 97 % (04/25/19 1702) Vital Signs (24h Range):  Temp:  [97.5 °F (36.4 °C)-98.7 °F (37.1 °C)] 97.5 °F (36.4 °C)  Pulse:  [54-76] 75  Resp:  [12-36] 36  SpO2:  [94 %-98 %] 97 %  BP: (130-182)/() 138/69     Date 04/25/19 0700 - 04/26/19 0659   Shift 0986-7358 5433-6261 2922-1836 24 Hour Total   INTAKE   P.O. 200   200   I.V.(mL/kg) 598.8(7.8) 225(2.9)  823.8(10.7)   Shift Total(mL/kg) 798.8(10.4) 225(2.9)  1023.8(13.3)   OUTPUT   Urine(mL/kg/hr) 700(1.1) 250  950   Shift Total(mL/kg) 700(9.1) 250(3.2)  950(12.3)   Weight (kg) 77.1 77.1 77.1 77.1       Neurosurgery Physical Exam  [id number]  Face symm  PERRLA  TM, CN 2-12 grossly normal  MACK, FCX4  Groin soft       Significant Labs:  Recent Labs   Lab 04/23/19 2329 04/24/19  2356   * 127*    138   K 3.8 4.0    106   CO2 20* 21*   BUN 11 13   CREATININE 0.8 0.8   CALCIUM 8.7 8.6*   MG 2.0 2.0     Recent Labs   Lab 04/23/19 2329 04/24/19  2356   WBC 13.23* 12.23   HGB 13.0 12.8   HCT 37.7 39.2    292         Assessment/Plan:     * SAH (subarachnoid hemorrhage)  63 year old female with a PMHx HTN, hypothyroidism presents as a transfer with a HH2 F4 SAH due to left hypophyseal saccular aneurysm, now s/p coil embolization on 4/17.    -Patient neurologically stable on exam  -Maintain head of bed > 30 degrees at all times  -Completed Keppra 500 BID x 7d for seizure prevention  -Continue TCDs daily x 14 days to monitor for vasospasm  -Continue Nimotop 60 mg q 4 hours x 21d for vasospasm prevention  -Continue permissive hypertension. Maintain SBP <200  -Maintain Na >135  -Strict I/O, net even to +500  -Daily PT/OT  -Notify NSGY immediately if any neurostatus change  -Will continue to follow        Uzma Celaya MD  Neurosurgery  [facility]

## 2019-04-25 NOTE — PLAN OF CARE
Problem: Adult Inpatient Plan of Care  Goal: Plan of Care Review  Outcome: Ongoing (interventions implemented as appropriate)  POC reviewed with pt at 0300. Pt verbalized understanding. Questions and concerns addressed. No acute events overnight. Pt progressing toward goals. Will continue to monitor. See flowsheets for full assessment and VS info.

## 2019-04-25 NOTE — ASSESSMENT & PLAN NOTE
S/p coiling of L ICA Aneurysm   PBD 9, PCD 8  Permissive HTN < 200  Nimodipine 60 mg q 4 h started 4/16 day 9  Daily TCDs, no vasospasm noted   Goal euvolemia

## 2019-04-25 NOTE — PLAN OF CARE
Patient's chart was reviewed by a stroke team provider.  Patient Patient is neurologically stable. NAEON. Continuing vasospasm watch, permissive HTN, matching I/Os.      TCDs so far have been negative for vasospasm  There is no new imaging to review.  Pending diagnostics to follow up on include: Daily TCDs    Continue Nimotop 60q4 x 21 days, and daily TCDs x 14 days, and Keppra 500bid x 7 days (completed).     For other recommendations please see our previous note completed on: 4/22/19    There are no new recommendations at this time. Will continue to follow. Discussed patient with staff. Please contact stroke team for any questions or concerns.     Angeline Louie MD  Vascular Neurology [facility]

## 2019-04-26 LAB
ALBUMIN SERPL BCP-MCNC: 3.1 G/DL (ref 3.5–5.2)
ALP SERPL-CCNC: 66 U/L (ref 55–135)
ALT SERPL W/O P-5'-P-CCNC: 66 U/L (ref 10–44)
ANION GAP SERPL CALC-SCNC: 8 MMOL/L (ref 8–16)
AST SERPL-CCNC: 27 U/L (ref 10–40)
BASOPHILS # BLD AUTO: 0.05 K/UL (ref 0–0.2)
BASOPHILS NFR BLD: 0.5 % (ref 0–1.9)
BILIRUB SERPL-MCNC: 0.4 MG/DL (ref 0.1–1)
BUN SERPL-MCNC: 10 MG/DL (ref 8–23)
CALCIUM SERPL-MCNC: 8.3 MG/DL (ref 8.7–10.5)
CHLORIDE SERPL-SCNC: 104 MMOL/L (ref 95–110)
CO2 SERPL-SCNC: 25 MMOL/L (ref 23–29)
CREAT SERPL-MCNC: 0.7 MG/DL (ref 0.5–1.4)
DIFFERENTIAL METHOD: ABNORMAL
EOSINOPHIL # BLD AUTO: 0.3 K/UL (ref 0–0.5)
EOSINOPHIL NFR BLD: 3.1 % (ref 0–8)
ERYTHROCYTE [DISTWIDTH] IN BLOOD BY AUTOMATED COUNT: 13.3 % (ref 11.5–14.5)
EST. GFR  (AFRICAN AMERICAN): >60 ML/MIN/1.73 M^2
EST. GFR  (NON AFRICAN AMERICAN): >60 ML/MIN/1.73 M^2
GLUCOSE SERPL-MCNC: 90 MG/DL (ref 70–110)
HCT VFR BLD AUTO: 40.9 % (ref 37–48.5)
HGB BLD-MCNC: 13.6 G/DL (ref 12–16)
IMM GRANULOCYTES # BLD AUTO: 0.22 K/UL (ref 0–0.04)
IMM GRANULOCYTES NFR BLD AUTO: 2.3 % (ref 0–0.5)
LYMPHOCYTES # BLD AUTO: 2.9 K/UL (ref 1–4.8)
LYMPHOCYTES NFR BLD: 30.8 % (ref 18–48)
MAGNESIUM SERPL-MCNC: 2 MG/DL (ref 1.6–2.6)
MCH RBC QN AUTO: 29.7 PG (ref 27–31)
MCHC RBC AUTO-ENTMCNC: 33.3 G/DL (ref 32–36)
MCV RBC AUTO: 89 FL (ref 82–98)
MONOCYTES # BLD AUTO: 0.8 K/UL (ref 0.3–1)
MONOCYTES NFR BLD: 8.3 % (ref 4–15)
NEUTROPHILS # BLD AUTO: 5.2 K/UL (ref 1.8–7.7)
NEUTROPHILS NFR BLD: 55 % (ref 38–73)
NRBC BLD-RTO: 0 /100 WBC
PHOSPHATE SERPL-MCNC: 4.1 MG/DL (ref 2.7–4.5)
PLATELET # BLD AUTO: 298 K/UL (ref 150–350)
PMV BLD AUTO: 9.3 FL (ref 9.2–12.9)
POTASSIUM SERPL-SCNC: 3.4 MMOL/L (ref 3.5–5.1)
POTASSIUM SERPL-SCNC: 3.6 MMOL/L (ref 3.5–5.1)
PROT SERPL-MCNC: 6.1 G/DL (ref 6–8.4)
RBC # BLD AUTO: 4.58 M/UL (ref 4–5.4)
SODIUM SERPL-SCNC: 137 MMOL/L (ref 136–145)
WBC # BLD AUTO: 9.42 K/UL (ref 3.9–12.7)

## 2019-04-26 PROCEDURE — 84132 ASSAY OF SERUM POTASSIUM: CPT

## 2019-04-26 PROCEDURE — 83735 ASSAY OF MAGNESIUM: CPT

## 2019-04-26 PROCEDURE — 63600175 PHARM REV CODE 636 W HCPCS: Performed by: NURSE PRACTITIONER

## 2019-04-26 PROCEDURE — 80053 COMPREHEN METABOLIC PANEL: CPT

## 2019-04-26 PROCEDURE — 25000003 PHARM REV CODE 250: Performed by: NURSE PRACTITIONER

## 2019-04-26 PROCEDURE — 20600001 HC STEP DOWN PRIVATE ROOM

## 2019-04-26 PROCEDURE — 25000003 PHARM REV CODE 250: Performed by: STUDENT IN AN ORGANIZED HEALTH CARE EDUCATION/TRAINING PROGRAM

## 2019-04-26 PROCEDURE — 84100 ASSAY OF PHOSPHORUS: CPT

## 2019-04-26 PROCEDURE — 99233 PR SUBSEQUENT HOSPITAL CARE,LEVL III: ICD-10-PCS | Mod: ,,, | Performed by: PSYCHIATRY & NEUROLOGY

## 2019-04-26 PROCEDURE — 85025 COMPLETE CBC W/AUTO DIFF WBC: CPT

## 2019-04-26 PROCEDURE — 99233 SBSQ HOSP IP/OBS HIGH 50: CPT | Mod: ,,, | Performed by: PSYCHIATRY & NEUROLOGY

## 2019-04-26 PROCEDURE — 25000003 PHARM REV CODE 250: Performed by: PSYCHIATRY & NEUROLOGY

## 2019-04-26 RX ORDER — POTASSIUM CHLORIDE 750 MG/1
50 CAPSULE, EXTENDED RELEASE ORAL ONCE
Status: COMPLETED | OUTPATIENT
Start: 2019-04-26 | End: 2019-04-26

## 2019-04-26 RX ADMIN — HEPARIN SODIUM 5000 UNITS: 5000 INJECTION, SOLUTION INTRAVENOUS; SUBCUTANEOUS at 09:04

## 2019-04-26 RX ADMIN — NIMODIPINE 60 MG: 30 CAPSULE, LIQUID FILLED ORAL at 02:04

## 2019-04-26 RX ADMIN — NIMODIPINE 60 MG: 30 CAPSULE, LIQUID FILLED ORAL at 01:04

## 2019-04-26 RX ADMIN — HEPARIN SODIUM 5000 UNITS: 5000 INJECTION, SOLUTION INTRAVENOUS; SUBCUTANEOUS at 02:04

## 2019-04-26 RX ADMIN — NIMODIPINE 60 MG: 30 CAPSULE, LIQUID FILLED ORAL at 09:04

## 2019-04-26 RX ADMIN — NIMODIPINE 60 MG: 30 CAPSULE, LIQUID FILLED ORAL at 06:04

## 2019-04-26 RX ADMIN — NIMODIPINE 60 MG: 30 CAPSULE, LIQUID FILLED ORAL at 10:04

## 2019-04-26 RX ADMIN — POTASSIUM CHLORIDE 50 MEQ: 750 CAPSULE, EXTENDED RELEASE ORAL at 10:04

## 2019-04-26 RX ADMIN — ACETAMINOPHEN 650 MG: 325 TABLET ORAL at 04:04

## 2019-04-26 RX ADMIN — SODIUM CHLORIDE: 0.9 INJECTION, SOLUTION INTRAVENOUS at 04:04

## 2019-04-26 RX ADMIN — HEPARIN SODIUM 5000 UNITS: 5000 INJECTION, SOLUTION INTRAVENOUS; SUBCUTANEOUS at 06:04

## 2019-04-26 RX ADMIN — ATORVASTATIN CALCIUM 10 MG: 10 TABLET, FILM COATED ORAL at 08:04

## 2019-04-26 RX ADMIN — LEVOTHYROXINE SODIUM 100 MCG: 50 TABLET ORAL at 06:04

## 2019-04-26 NOTE — ASSESSMENT & PLAN NOTE
Patient with complaint of sudden onset of HA this AM. Seen at [facility] for severe headache and photophobia. CTA completed which revealed left ICA saccular aneurysm. Patient transferred to [facility] for further neurological evaluation. Admitted to [facility].   Step down to neuro surgery     MRI revealing SuAH within interpedunclar fossa and the anterior basal cisterns. Small amount of IVH in the occipital horn of the R lateral ventricle. Patient s/p L ICA embolization w/ coiling completed in IR 4/17.    Continue Nimotop 60q4 x 21 days, and daily TCDs x 14 days, and Keppra 500bid x 7 days    Antithrombotics for secondary stroke prevention:  None due to aneurysm and potential SAH     Statins for secondary stroke prevention and hyperlipidemia, if present:   Continue Atorvastatin 40 mg     Aggressive risk factor modification: family hx of aneurysm, L ICA aneusym      Rehab efforts: PT/OT/SLP to evaluate and treat - no needs at this time     Diagnostics ordered/pending: daily TCDs    VTE prophylaxis: hep 5000, SCDs     BP parameters: SAH: Secured aneurysm, SBP < 200 per NICU

## 2019-04-26 NOTE — PT/OT/SLP DISCHARGE
Physical Therapy Discharge Summary    Name: Adriana Fonseca  MRN: [id number]   Principal Problem: SAH (subarachnoid hemorrhage)     Patient Discharged from acute Physical Therapy on 2019.  Please refer to prior PT noted date on 2019 for functional status.     Assessment:     Patient was discharged unexpectedly.  Information required to complete an accurate discharge summary is unknown.  Refer to therapy initial evaluation and last progress note for initial and most recent functional status and goal achievement.  Recommendations made may be found in medical record.    Objective:     GOALS:   Multidisciplinary Problems     Physical Therapy Goals        Problem: Physical Therapy Goal    Goal Priority Disciplines Outcome Goal Variances Interventions   Physical Therapy Goal     PT, PT/OT Ongoing (interventions implemented as appropriate)     Description:  Goals to be met by: 5/3/2019     Patient will increase functional independence with mobility by performin. Supine to sit with Elizabeth  2. Sit to supine with Elizabeth  3. Sit to stand transfer with Elizabeth  4. Gait  x 150 feet with Supervision using no AD- Met  Gait 500' with independent using no AD.   5. Stand for 5 minutes while performing dynamic balance exercises without AD, without loss of balance to decrease risk of falls.  6. Lower extremity exercise program x20 reps per handout, with independence                       Reasons for Discontinuation of Therapy Services  Satisfactory goal achievement. and Patient declines to continue care.  Patient is safe to ambulate ad hoda without AD. Patient reports ambulating in her room throughout the day. She reports she is at her baseline, opting to discontinue skilled PT.    Plan:     Patient Discharged to: Home no PT services needed.     Mita Bahena, PT  2019

## 2019-04-26 NOTE — PROGRESS NOTES
[facility]  Neurocritical Care  Progress Note    Admit Date: 2019  Service Date: 2019  Length of Stay: 10    Subjective:     Chief Complaint: SAH (subarachnoid hemorrhage)    History of Present Illness: The patient is a 63-year-old female with PMH of HTN, HLD, Thyroid Disease, that presents as a transfer from [facility] for management of intracranial aneurysm.  Patient presented to [facility] ED with complaints of sudden onset severe headache with neck stiffness that began at 8am today. She acknowledges, headache, visual disturbances, nausea without vomiting, but denies numbness and tingling.  CT head and CTA brain were obtained. 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  There was no evidence of acute intracranial hemorrhage.   She  endorses having taken IBU but with no relief. Denies anticoagulant use. Of note,  patient's father  or intracerebral aneurysm at an early age.        Hospital Course:  Admit to NCC, Keppra, Nimitop, CTH, MRI   s/p Angio, ECHO, Permissive HTN   Solumedrol for HA, Start heparin, Start Diet   Boost added to diet, Solumedrol scheduled, Myralax, ABG    PBD 4 . No significant events over night. TCDs  With no vasospasms. Restarted home meds.   TCDs pending. No H/A today. wea keppra. EEG negative  TCDs from  - showed no evidence of vasospasms. No H/A today. No change in neuro exam. Bolus 500cc to keep I/O equal.  : NAEO  : Stable, mild headache today, TCD negative for vasospasms  : Transfer to [facility] floor    Interval History: No acute events overnight, stable vitals     Review of Systems   Constitutional: Negative for chills, diaphoresis, fatigue and fever.   Eyes: Negative for photophobia, pain, discharge and visual disturbance.   Respiratory: Negative for cough, chest tightness, shortness of breath and wheezing.    Cardiovascular: Negative for chest  pain, palpitations and leg swelling.   Neurological: Negative for dizziness, seizures, facial asymmetry, speech difficulty, weakness    Objective:     Vitals:  Temp: 98.3 °F (36.8 °C)  Pulse: 83  Rhythm: normal sinus rhythm  BP: 125/66  MAP (mmHg): 84  Resp: 19  SpO2: 97 %  O2 Device (Oxygen Therapy): room air    Temp  Min: 97.5 °F (36.4 °C)  Max: 98.4 °F (36.9 °C)  Pulse  Min: 56  Max: 83  BP  Min: 125/66  Max: 189/89  MAP (mmHg)  Min: 84  Max: 128  Resp  Min: 14  Max: 36  SpO2  Min: 94 %  Max: 99 %    04/25 0701 - 04/26 0700  In: 1931.3 [P.O.:200; I.V.:1731.3]  Out: 2900 [Urine:2900]   Unmeasured Output  Urine Occurrence: 0  Stool Occurrence: 0  Pad Count: 1       Physical Exam  Constitutional: Well-nourished and -developed. No apparent distress.   Eyes: Conjunctiva clear, anicteric. Lids no lesions.  Head/Ears/Nose/Mouth/Throat/Neck: Moist mucous membranes. External ears, nose atraumatic.   Cardiovascular: Regular rhythm. No murmurs. No leg edema.  Respiratory: Comfortable respirations. Clear to auscultation.  Gastrointestinal: No hernia. Soft, nondistended, nontender. + bowel sounds.     Neurologic:  -GCS E4V5M6  -Alert. Oriented to person, place, and time. Speech fluent. Follows commands.  -Cranial nerves II-XII intact  -Motor 5/5 throughout   -Sensation grossly intact  -Coordination: FTN w/o dysmetria     Medications:  Continuous Scheduled  atorvastatin 10 mg Daily   heparin (porcine) 5,000 Units Q8H   levothyroxine 100 mcg Before breakfast   niMODipine 60 mg Q4H   polyethylene glycol 17 g Daily   senna-docusate 8.6-50 mg 1 tablet BID   PRN  acetaminophen 650 mg Q6H PRN   hydrALAZINE 10 mg Q4H PRN   methylPREDNISolone sodium succinate 60 mg Q8H PRN   ondansetron 4 mg Q6H PRN   oxyCODONE 5 mg Q6H PRN   sodium chloride 0.9% 10 mL PRN     Today I personally reviewed pertinent medications, lines/drains/airways, imaging, cardiology results, laboratory results, notably:    Diet  Diet Adult Regular (IDDSI Level 7)  [facility]; Thin  Diet Adult Regular (IDDSI Level 7) [facility]; Thin        Assessment/Plan:     Neuro  * SAH (subarachnoid hemorrhage)  PBD 10, PCD 9  Permissive HTN, SBP < 200  Nimodipine 60 mg q 4 h for 21 days  Daily TCDs, no vasospasm noted  Euvolemia, euglycemia, eunatremia  Keppra for seizure ppx 7/7 days (Completed )       Vasogenic cerebral edema  As seen on Imaging  Q1 hr Neuro checks  Stat CT for changes in LOC  Monitor neuro exam and imaging  NSGY on board      Cardiac/Vascular  Essential hypertension  Cardiac Monitoring  Permissive HTN, SBP goal  <200   Prn hydralazine   Echo; EF 60% mild TR; Pulm. htn    Endocrine  Thyroid disease  Hx of Hypothyroidism  TSH .684 and Free T4 1.32  Continue on Levothyroxine 100 mcg daily           The patient is being Prophylaxed for:  Venous Thromboembolism with: Chemical  Stress Ulcer with: H2B  Ventilator Pneumonia with: none    Activity Orders          Diet Adult Regular (IDDSI Level 7) [facility]; Thin: Regular starting at 04/18 0707        Full Code    Lien Shafer MD  Neurocritical Care  [facility] [address] [facility]

## 2019-04-26 NOTE — PROGRESS NOTES
[facility]  Vascular Neurology  Comprehensive Stroke Center  Progress Note    Assessment/Plan:     * SAH (subarachnoid hemorrhage)  Patient with complaint of sudden onset of HA this AM. Seen at [facility] for severe headache and photophobia. CTA completed which revealed left ICA saccular aneurysm. Patient transferred to [facility] for further neurological evaluation. Admitted to [facility].   Step down to neuro surgery     MRI revealing SuAH within interpedunclar fossa and the anterior basal cisterns. Small amount of IVH in the occipital horn of the R lateral ventricle. Patient s/p L ICA embolization w/ coiling completed in IR 4/17.    Continue Nimotop 60q4 x 21 days, and daily TCDs x 14 days, and Keppra 500bid x 7 days    Antithrombotics for secondary stroke prevention:  None due to aneurysm and potential SAH     Statins for secondary stroke prevention and hyperlipidemia, if present:   Continue Atorvastatin 40 mg     Aggressive risk factor modification: family hx of aneurysm, L ICA aneusym      Rehab efforts: PT/OT/SLP to evaluate and treat - no needs at this time     Diagnostics ordered/pending: daily TCDs    VTE prophylaxis: hep 5000, SCDs     BP parameters: SAH: Secured aneurysm, SBP < 200 per NICU      Hyperlipidemia, mixed  Stroke risk factor        Vasogenic cerebral edema  Area of cytotoxic cerebral edema identified when reviewing brain imaging in the subarachnoid territory. There is not mass effect associated with it. We will continue to monitor the patients clinical exam for any worsening of symptoms which may indicate expansion of the stroke or the area of the edema resulting in the clinical change. The pattern is suggestive of L ICA aneurysm etiology.        Thyroid disease  TSH 0.864  Continue home medication     Headache  No headache at present     Essential hypertension  Stroke risk factor  SBP <200 for secured aneurysm  SBP within goal at this time          4/17  - MRI revealing subarachnoid blood within the interpeduncular fossa and anterior basal cisterns. Patient went to IR this AM for L ICA embolization w/ coiling. No complaints of headache on exam.  4/19 - NAEON, no headaches or vision changes. Neurologically stable.   4/22/19 - TCDs negative for vasospasm, headache present but treated with tylenol. No complains of weakness, numbness, photophobia or other new neurologic deficit   4/26/19 - plan to step down to neurosurgery, headaches improving     STROKE DOCUMENTATION        NIH Scale:  1a. Level of Consciousness: 0-->Alert, keenly responsive  1b. LOC Questions: 0-->Answers both questions correctly  1c. LOC Commands: 0-->Performs both tasks correctly  2. Best Gaze: 0-->Normal  3. Visual: 0-->No visual loss  4. Facial Palsy: 0-->Normal symmetrical movements  5a. Motor Arm, Left: 0-->No drift, limb holds 90 (or 45) degrees for full 10 secs  5b. Motor Arm, Right: 0-->No drift, limb holds 90 (or 45) degrees for full 10 secs  6a. Motor Leg, Left: 0-->No drift, leg holds 30 degree position for full 5 secs  6b. Motor Leg, Right: 0-->No drift, leg holds 30 degree position for full 5 secs  7. Limb Ataxia: 0-->Absent  8. Sensory: 0-->Normal, no sensory loss  9. Best Language: 0-->No aphasia, normal  10. Dysarthria: 0-->Normal  11. Extinction and Inattention (formerly Neglect): 0-->No abnormality  Total (NIH Stroke Scale): 0       Modified Gilbert Score: 0  Inez Coma Scale:    ABCD2 Score:    [id number] Score:   HAS -BLED Score:   ICH Score:   Hunt & Roth Classification:Grade I     Hemorrhagic change of an Ischemic Stroke: Does this patient have an ischemic stroke with hemorrhagic changes? No     Neurologic Chief Complaint: headache, s/p coiling of L ICA aneurysm    Subjective:     Interval History: Patient is seen for follow-up neurological assessment and treatment recommendations:    plan to step down to neurosurgery, headaches improving       HPI, Past Medical, Family, and  Social History remains the same as documented in the initial encounter.     Review of Systems   Constitutional: Negative for fever.   Neurological: Negative for weakness, numbness and headaches.   Psychiatric/Behavioral: Negative for agitation and confusion.     Scheduled Meds:   atorvastatin  10 mg Oral Daily    heparin (porcine)  5,000 Units Subcutaneous Q8H    levothyroxine  100 mcg Oral Before breakfast    niMODipine  60 mg Oral Q4H    polyethylene glycol  17 g Oral Daily    senna-docusate 8.6-50 mg  1 tablet Oral BID     Continuous Infusions:    PRN Meds:acetaminophen, hydrALAZINE, methylPREDNISolone sodium succinate, ondansetron, oxyCODONE, sodium chloride 0.9%    Objective:     Vital Signs (Most Recent):  Temp: 98 °F (36.7 °C) (04/26/19 1505)  Pulse: 77 (04/26/19 1705)  Resp: 14 (04/26/19 1705)  BP: (!) 119/92 (04/26/19 1705)  SpO2: 96 % (04/26/19 1705)  BP Location: Right arm    Vital Signs Range (Last 24H):  Temp:  [97.9 °F (36.6 °C)-98.4 °F (36.9 °C)]   Pulse:  [56-85]   Resp:  [14-26]   BP: (119-189)/(62-92)   SpO2:  [94 %-100 %]   BP Location: Right arm    Physical Exam    Constitutional: She is oriented to person, place, and time. She appears well-developed.   HENT:   Head: Normocephalic and atraumatic.   Cardiovascular: Normal rate.   Pulmonary/Chest: Effort normal.   Musculoskeletal: Normal range of motion.   Skin: Skin is warm and dry   Psychiatric: She has a normal mood and affect.      Neurological Exam:   LOC: alert  Attention Span: Good   Language: No aphasia  Articulation: No dysarthria  Orientation: Person, Place, Time   Visual Fields: Full  EOM (CN III, IV, VI): Full/intact  Motor: Arm left  Normal 5/5  Leg left  Normal 5/5  Arm right  Normal 5/5  Leg right Normal 5/5  Sensation: Intact to light touch      Laboratory:  CMP:   Recent Labs   Lab 04/26/19  0422 04/26/19  1400   CALCIUM 8.3*  --    ALBUMIN 3.1*  --    PROT 6.1  --      --    K 3.4* 3.6   CO2 25  --      --    BUN  10  --    CREATININE 0.7  --    ALKPHOS 66  --    ALT 66*  --    AST 27  --    BILITOT 0.4  --      CBC:   Recent Labs   Lab 04/26/19  0422   WBC 9.42   RBC 4.58   HGB 13.6   HCT 40.9      MCV 89   MCH 29.7   MCHC 33.3     Lipid Panel:   No results for input(s): CHOL, LDLCALC, HDL, TRIG in the last 168 hours.  Hgb A1C:   No results for input(s): HGBA1C in the last 168 hours.  TSH:   No results for input(s): TSH in the last 168 hours.    Diagnostic Results     Brain Imaging  MRI MRA Brain w wo 4/16  1.6 cm saccular aneurysm arising from the supraclinoid segment of the left ICA.  No additional aneurysm is identified, allowing for the low sensitivity of MRA for small aneurysms.     Vessel Imaging   IR angiogram 4/17/19  1. Successful embolization of a left internal carotid artery superior hypophyseal segment aneurysm using platinum coils.    2.  Otherwise normal cerebral angiogram.    Cardiac Imaging   TTE 4/17  · Concentric left ventricular remodeling.  · Normal left ventricular systolic function. The estimated ejection fraction is 60%  · Normal LV diastolic function.  · Normal right ventricular systolic function.  · Mild tricuspid regurgitation.  · Normal central venous pressure (3 mm Hg).  · The estimated PA systolic pressure is 40 mm Hg  · Normal left atrium     TCD   No Doppler evidence of vasospasm.      ALTAF Ray  Comprehensive Stroke Center  Department of Vascular Neurology   [facility]

## 2019-04-26 NOTE — ASSESSMENT & PLAN NOTE
PBD 10, PCD 9  Permissive HTN, SBP < 200  Nimodipine 60 mg q 4 h for 21 days  Daily TCDs, no vasospasm noted  Euvolemia, euglycemia, eunatremia  Keppra for seizure ppx 7/7 days (Completed )

## 2019-04-26 NOTE — PLAN OF CARE
04/26/19 1452   Discharge Reassessment   Assessment Type Discharge Planning Reassessment   Provided patient/caregiver education on the expected discharge date and the discharge plan Yes   Do you have any problems affording any of your prescribed medications? No   Discharge Plan A Home with family   Discharge Plan B Home with family   DME Needed Upon Discharge  none   Patient choice form signed by patient/caregiver N/A   Anticipated Discharge Disposition Home   Can the patient answer the patient profile reliably? Yes, cognitively intact   How does the patient rate their overall health at the present time? Good   Describe the patient's ability to walk at the present time. No restrictions   How often would a person be available to care for the patient? Whenever needed   Number of comorbid conditions (as recorded on the chart) Three   During the past month, has the patient often been bothered by feeling down, depressed or hopeless? No   During the past month, has the patient often been bothered by little interest or pleasure in doing things? No   Post-Acute Status   Post-Acute Authorization Other   Other Status No Post-Acute Service Needs   Discharge Delays None known at this time     Gemini Hendrickson RN, CCRN-K, [facility]  Neuro-Critical Care   [phone]

## 2019-04-26 NOTE — SUBJECTIVE & OBJECTIVE
Interval History: No acute events overnight, stable vitals     Review of Systems   Constitutional: Negative for chills, diaphoresis, fatigue and fever.   Eyes: Negative for photophobia, pain, discharge and visual disturbance.   Respiratory: Negative for cough, chest tightness, shortness of breath and wheezing.    Cardiovascular: Negative for chest pain, palpitations and leg swelling.   Neurological: Negative for dizziness, seizures, facial asymmetry, speech difficulty, weakness    Objective:     Vitals:  Temp: 98.3 °F (36.8 °C)  Pulse: 83  Rhythm: normal sinus rhythm  BP: 125/66  MAP (mmHg): 84  Resp: 19  SpO2: 97 %  O2 Device (Oxygen Therapy): room air    Temp  Min: 97.5 °F (36.4 °C)  Max: 98.4 °F (36.9 °C)  Pulse  Min: 56  Max: 83  BP  Min: 125/66  Max: 189/89  MAP (mmHg)  Min: 84  Max: 128  Resp  Min: 14  Max: 36  SpO2  Min: 94 %  Max: 99 %    04/25 0701 - 04/26 0700  In: 1931.3 [P.O.:200; I.V.:1731.3]  Out: 2900 [Urine:2900]   Unmeasured Output  Urine Occurrence: 0  Stool Occurrence: 0  Pad Count: 1       Physical Exam  Constitutional: Well-nourished and -developed. No apparent distress.   Eyes: Conjunctiva clear, anicteric. Lids no lesions.  Head/Ears/Nose/Mouth/Throat/Neck: Moist mucous membranes. External ears, nose atraumatic.   Cardiovascular: Regular rhythm. No murmurs. No leg edema.  Respiratory: Comfortable respirations. Clear to auscultation.  Gastrointestinal: No hernia. Soft, nondistended, nontender. + bowel sounds.     Neurologic:  -GCS E4V5M6  -Alert. Oriented to person, place, and time. Speech fluent. Follows commands.  -Cranial nerves II-XII intact  -Motor 5/5 throughout   -Sensation grossly intact  -Coordination: FTN w/o dysmetria     Medications:  Continuous Scheduled  atorvastatin 10 mg Daily   heparin (porcine) 5,000 Units Q8H   levothyroxine 100 mcg Before breakfast   niMODipine 60 mg Q4H   polyethylene glycol 17 g Daily   senna-docusate 8.6-50 mg 1 tablet BID   PRN  acetaminophen 650 mg Q6H  PRN   hydrALAZINE 10 mg Q4H PRN   methylPREDNISolone sodium succinate 60 mg Q8H PRN   ondansetron 4 mg Q6H PRN   oxyCODONE 5 mg Q6H PRN   sodium chloride 0.9% 10 mL PRN     Today I personally reviewed pertinent medications, lines/drains/airways, imaging, cardiology results, laboratory results, notably:    Diet  Diet Adult Regular (IDDSI Level 7) [facility]; Thin  Diet Adult Regular (IDDSI Level 7) [facility]; Thin

## 2019-04-26 NOTE — PLAN OF CARE
Problem: Adult Inpatient Plan of Care  Goal: Plan of Care Review  Outcome: Outcome(s) achieved Date Met: 04/26/19  POC reviewed with Ms. Fonseca at 1400. Pt verbalized understanding. Questions and concerns addressed. No acute events today. Pt progressing toward goals. Will continue to monitor. See flowsheets for full assessment and VS info.

## 2019-04-26 NOTE — SUBJECTIVE & OBJECTIVE
Neurologic Chief Complaint: headache, s/p coiling of L ICA aneurysm    Subjective:     Interval History: Patient is seen for follow-up neurological assessment and treatment recommendations:    plan to step down to neurosurgery, headaches improving       HPI, Past Medical, Family, and Social History remains the same as documented in the initial encounter.     Review of Systems   Constitutional: Negative for fever.   Neurological: Negative for weakness, numbness and headaches.   Psychiatric/Behavioral: Negative for agitation and confusion.     Scheduled Meds:   atorvastatin  10 mg Oral Daily    heparin (porcine)  5,000 Units Subcutaneous Q8H    levothyroxine  100 mcg Oral Before breakfast    niMODipine  60 mg Oral Q4H    polyethylene glycol  17 g Oral Daily    senna-docusate 8.6-50 mg  1 tablet Oral BID     Continuous Infusions:    PRN Meds:acetaminophen, hydrALAZINE, methylPREDNISolone sodium succinate, ondansetron, oxyCODONE, sodium chloride 0.9%    Objective:     Vital Signs (Most Recent):  Temp: 98 °F (36.7 °C) (04/26/19 1505)  Pulse: 77 (04/26/19 1705)  Resp: 14 (04/26/19 1705)  BP: (!) 119/92 (04/26/19 1705)  SpO2: 96 % (04/26/19 1705)  BP Location: Right arm    Vital Signs Range (Last 24H):  Temp:  [97.9 °F (36.6 °C)-98.4 °F (36.9 °C)]   Pulse:  [56-85]   Resp:  [14-26]   BP: (119-189)/(62-92)   SpO2:  [94 %-100 %]   BP Location: Right arm    Physical Exam    Constitutional: She is oriented to person, place, and time. She appears well-developed.   HENT:   Head: Normocephalic and atraumatic.   Cardiovascular: Normal rate.   Pulmonary/Chest: Effort normal.   Musculoskeletal: Normal range of motion.   Skin: Skin is warm and dry   Psychiatric: She has a normal mood and affect.      Neurological Exam:   LOC: alert  Attention Span: Good   Language: No aphasia  Articulation: No dysarthria  Orientation: Person, Place, Time   Visual Fields: Full  EOM (CN III, IV, VI): Full/intact  Motor: Arm left  Normal 5/5  Leg  left  Normal 5/5  Arm right  Normal 5/5  Leg right Normal 5/5  Sensation: Intact to light touch      Laboratory:  CMP:   Recent Labs   Lab 04/26/19  0422 04/26/19  1400   CALCIUM 8.3*  --    ALBUMIN 3.1*  --    PROT 6.1  --      --    K 3.4* 3.6   CO2 25  --      --    BUN 10  --    CREATININE 0.7  --    ALKPHOS 66  --    ALT 66*  --    AST 27  --    BILITOT 0.4  --      CBC:   Recent Labs   Lab 04/26/19  0422   WBC 9.42   RBC 4.58   HGB 13.6   HCT 40.9      MCV 89   MCH 29.7   MCHC 33.3     Lipid Panel:   No results for input(s): CHOL, LDLCALC, HDL, TRIG in the last 168 hours.  Hgb A1C:   No results for input(s): HGBA1C in the last 168 hours.  TSH:   No results for input(s): TSH in the last 168 hours.    Diagnostic Results     Brain Imaging  MRI MRA Brain w wo 4/16  1.6 cm saccular aneurysm arising from the supraclinoid segment of the left ICA.  No additional aneurysm is identified, allowing for the low sensitivity of MRA for small aneurysms.     Vessel Imaging   IR angiogram 4/17/19  1. Successful embolization of a left internal carotid artery superior hypophyseal segment aneurysm using platinum coils.    2.  Otherwise normal cerebral angiogram.    Cardiac Imaging   TTE 4/17  · Concentric left ventricular remodeling.  · Normal left ventricular systolic function. The estimated ejection fraction is 60%  · Normal LV diastolic function.  · Normal right ventricular systolic function.  · Mild tricuspid regurgitation.  · Normal central venous pressure (3 mm Hg).  · The estimated PA systolic pressure is 40 mm Hg  · Normal left atrium     TCD   No Doppler evidence of vasospasm.

## 2019-04-26 NOTE — PLAN OF CARE
Problem: Adult Inpatient Plan of Care  Goal: Plan of Care Review  POC reviewed with pt at 0400. Pt verbalized understanding. Questions and concerns addressed. Pt remained afebrile overnight. Pt ambulated to bedside commode and bathroom with minimal assistance. No acute events overnight. Pt progressing toward goals. Will continue to monitor. See flowsheets for full assessment and VS info

## 2019-04-27 LAB
ALBUMIN SERPL BCP-MCNC: 3.1 G/DL (ref 3.5–5.2)
ALP SERPL-CCNC: 64 U/L (ref 55–135)
ALT SERPL W/O P-5'-P-CCNC: 48 U/L (ref 10–44)
ANION GAP SERPL CALC-SCNC: 11 MMOL/L (ref 8–16)
AST SERPL-CCNC: 16 U/L (ref 10–40)
BASOPHILS # BLD AUTO: 0.05 K/UL (ref 0–0.2)
BASOPHILS NFR BLD: 0.5 % (ref 0–1.9)
BILIRUB SERPL-MCNC: 0.4 MG/DL (ref 0.1–1)
BUN SERPL-MCNC: 9 MG/DL (ref 8–23)
CALCIUM SERPL-MCNC: 8.8 MG/DL (ref 8.7–10.5)
CHLORIDE SERPL-SCNC: 104 MMOL/L (ref 95–110)
CO2 SERPL-SCNC: 21 MMOL/L (ref 23–29)
CREAT SERPL-MCNC: 0.8 MG/DL (ref 0.5–1.4)
DIFFERENTIAL METHOD: ABNORMAL
EOSINOPHIL # BLD AUTO: 0.3 K/UL (ref 0–0.5)
EOSINOPHIL NFR BLD: 2.7 % (ref 0–8)
ERYTHROCYTE [DISTWIDTH] IN BLOOD BY AUTOMATED COUNT: 13.4 % (ref 11.5–14.5)
EST. GFR  (AFRICAN AMERICAN): >60 ML/MIN/1.73 M^2
EST. GFR  (NON AFRICAN AMERICAN): >60 ML/MIN/1.73 M^2
GLUCOSE SERPL-MCNC: 100 MG/DL (ref 70–110)
HCT VFR BLD AUTO: 39.2 % (ref 37–48.5)
HGB BLD-MCNC: 13.3 G/DL (ref 12–16)
IMM GRANULOCYTES # BLD AUTO: 0.21 K/UL (ref 0–0.04)
IMM GRANULOCYTES NFR BLD AUTO: 1.9 % (ref 0–0.5)
LYMPHOCYTES # BLD AUTO: 2.8 K/UL (ref 1–4.8)
LYMPHOCYTES NFR BLD: 25.7 % (ref 18–48)
MAGNESIUM SERPL-MCNC: 1.9 MG/DL (ref 1.6–2.6)
MCH RBC QN AUTO: 29.9 PG (ref 27–31)
MCHC RBC AUTO-ENTMCNC: 33.9 G/DL (ref 32–36)
MCV RBC AUTO: 88 FL (ref 82–98)
MONOCYTES # BLD AUTO: 1 K/UL (ref 0.3–1)
MONOCYTES NFR BLD: 9.2 % (ref 4–15)
NEUTROPHILS # BLD AUTO: 6.5 K/UL (ref 1.8–7.7)
NEUTROPHILS NFR BLD: 60 % (ref 38–73)
NRBC BLD-RTO: 0 /100 WBC
PHOSPHATE SERPL-MCNC: 3.5 MG/DL (ref 2.7–4.5)
PLATELET # BLD AUTO: 316 K/UL (ref 150–350)
PMV BLD AUTO: 9.4 FL (ref 9.2–12.9)
POTASSIUM SERPL-SCNC: 3.8 MMOL/L (ref 3.5–5.1)
PROT SERPL-MCNC: 6 G/DL (ref 6–8.4)
RBC # BLD AUTO: 4.45 M/UL (ref 4–5.4)
SODIUM SERPL-SCNC: 136 MMOL/L (ref 136–145)
WBC # BLD AUTO: 10.81 K/UL (ref 3.9–12.7)

## 2019-04-27 PROCEDURE — 84100 ASSAY OF PHOSPHORUS: CPT

## 2019-04-27 PROCEDURE — 25000003 PHARM REV CODE 250: Performed by: PSYCHIATRY & NEUROLOGY

## 2019-04-27 PROCEDURE — 83735 ASSAY OF MAGNESIUM: CPT

## 2019-04-27 PROCEDURE — 20600001 HC STEP DOWN PRIVATE ROOM

## 2019-04-27 PROCEDURE — 85025 COMPLETE CBC W/AUTO DIFF WBC: CPT

## 2019-04-27 PROCEDURE — 36415 COLL VENOUS BLD VENIPUNCTURE: CPT

## 2019-04-27 PROCEDURE — 63600175 PHARM REV CODE 636 W HCPCS: Performed by: NURSE PRACTITIONER

## 2019-04-27 PROCEDURE — 25000003 PHARM REV CODE 250: Performed by: NURSE PRACTITIONER

## 2019-04-27 PROCEDURE — 80053 COMPREHEN METABOLIC PANEL: CPT

## 2019-04-27 RX ADMIN — HEPARIN SODIUM 5000 UNITS: 5000 INJECTION, SOLUTION INTRAVENOUS; SUBCUTANEOUS at 05:04

## 2019-04-27 RX ADMIN — NIMODIPINE 60 MG: 30 CAPSULE, LIQUID FILLED ORAL at 08:04

## 2019-04-27 RX ADMIN — HEPARIN SODIUM 5000 UNITS: 5000 INJECTION, SOLUTION INTRAVENOUS; SUBCUTANEOUS at 02:04

## 2019-04-27 RX ADMIN — HEPARIN SODIUM 5000 UNITS: 5000 INJECTION, SOLUTION INTRAVENOUS; SUBCUTANEOUS at 08:04

## 2019-04-27 RX ADMIN — NIMODIPINE 60 MG: 30 CAPSULE, LIQUID FILLED ORAL at 09:04

## 2019-04-27 RX ADMIN — ATORVASTATIN CALCIUM 10 MG: 10 TABLET, FILM COATED ORAL at 09:04

## 2019-04-27 RX ADMIN — NIMODIPINE 60 MG: 30 CAPSULE, LIQUID FILLED ORAL at 05:04

## 2019-04-27 RX ADMIN — NIMODIPINE 60 MG: 30 CAPSULE, LIQUID FILLED ORAL at 06:04

## 2019-04-27 RX ADMIN — NIMODIPINE 60 MG: 30 CAPSULE, LIQUID FILLED ORAL at 02:04

## 2019-04-27 RX ADMIN — LEVOTHYROXINE SODIUM 100 MCG: 50 TABLET ORAL at 05:04

## 2019-04-27 RX ADMIN — NIMODIPINE 60 MG: 30 CAPSULE, LIQUID FILLED ORAL at 01:04

## 2019-04-27 NOTE — SUBJECTIVE & OBJECTIVE
Interval History:     Intact on exam and no issues. TCD without sonographic evidence for vasospasm    Medications:  Continuous Infusions:    Scheduled Meds:   atorvastatin  10 mg Oral Daily    heparin (porcine)  5,000 Units Subcutaneous Q8H    levothyroxine  100 mcg Oral Before breakfast    niMODipine  60 mg Oral Q4H    polyethylene glycol  17 g Oral Daily    senna-docusate 8.6-50 mg  1 tablet Oral BID     PRN Meds:acetaminophen, hydrALAZINE, methylPREDNISolone sodium succinate, ondansetron, oxyCODONE, sodium chloride 0.9%     Review of Systems    Objective:     Weight: 77.1 kg (169 lb 15.6 oz)  Body mass index is 26.62 kg/m².  Vital Signs (Most Recent):  Temp: 98 °F (36.7 °C) (04/26/19 1505)  Pulse: 79 (04/26/19 1805)  Resp: 15 (04/26/19 1805)  BP: 125/64 (04/26/19 1805)  SpO2: 97 % (04/26/19 1805) Vital Signs (24h Range):  Temp:  [98 °F (36.7 °C)-98.4 °F (36.9 °C)] 98 °F (36.7 °C)  Pulse:  [56-85] 79  Resp:  [14-26] 15  SpO2:  [94 %-100 %] 97 %  BP: (119-189)/(62-92) 125/64     Date 04/26/19 0700 - 04/27/19 0659   Shift 6057-2752 0868-9214 2142-4839 24 Hour Total   INTAKE   I.V.(mL/kg) 300(3.9)   300(3.9)   Shift Total(mL/kg) 300(3.9)   300(3.9)   OUTPUT   Urine(mL/kg/hr) 1100(1.8) 150  1250   Shift Total(mL/kg) 1100(14.3) 150(1.9)  1250(16.2)   Weight (kg) 77.1 77.1 77.1 77.1       Neurosurgery Physical Exam  E4V5M6  Face symm  PERRLA  TM, CN 2-12 grossly normal  MACK, FCX4  Groin soft       Significant Labs:  Recent Labs   Lab 04/24/19  2356 04/26/19  0422 04/26/19  1400   * 90  --     137  --    K 4.0 3.4* 3.6    104  --    CO2 21* 25  --    BUN 13 10  --    CREATININE 0.8 0.7  --    CALCIUM 8.6* 8.3*  --    MG 2.0 2.0  --      Recent Labs   Lab 04/24/19  8306 04/26/19  0422   WBC 12.23 9.42   HGB 12.8 13.6   HCT 39.2 40.9    298

## 2019-04-27 NOTE — NURSING
Nursing Transfer Note       Transfer to NSU 7th floor Room 725 A from [facility] room 9088    Transfer via wheelchair x1 RN    Transfer with all pt personal belongings    Transported by Franchesca Petty RN    Medicines sent: NO    Chart sent with patient: YES    Notified: FELI Oden    Bedside Neuro assessment performed: Yes    Bedside Handoff given to: CHELSEY De Anda

## 2019-04-27 NOTE — PROGRESS NOTES
[facility]  Neurosurgery  Progress Note    Subjective:     History of Present Illness: 63 year old female with PMHx HTN, hypothyroidism presents as a transfer from [facility] for evaluation of left ICA saccular aneurysm. Patient's family at bedside providing majority of history. Patient reports severe posterior headache, neck pain, photophobia starting at 930am. Subsequently brought to the ED by her family. Denies weakness, numbness, paresthesias, seizure activity, LOC, falls/trauma. Family at bedside state patient's father  in his 30s of intracerebral aneurysmal rupture. Transferred to [facility] for higher level of care. Denies use of anticoagulants or antiplatelet therapy. Reports compliance with BP medication however family states SBP is usually ~150s at home. Review of coags at OSH all within normal limits: aPTT 28, INR 0.9, PT 12.7.    Post-Op Info:  Procedure(s) (LRB):  ANGIOGRAM-CEREBRAL (Bilateral)   9 Days Post-Op     Interval History:     Intact on exam and no issues. TCD without sonographic evidence for vasospasm    Medications:  Continuous Infusions:    Scheduled Meds:   atorvastatin  10 mg Oral Daily    heparin (porcine)  5,000 Units Subcutaneous Q8H    levothyroxine  100 mcg Oral Before breakfast    niMODipine  60 mg Oral Q4H    polyethylene glycol  17 g Oral Daily    senna-docusate 8.6-50 mg  1 tablet Oral BID     PRN Meds:acetaminophen, hydrALAZINE, methylPREDNISolone sodium succinate, ondansetron, oxyCODONE, sodium chloride 0.9%     Review of Systems    Objective:     Weight: 77.1 kg (169 lb 15.6 oz)  Body mass index is 26.62 kg/m².  Vital Signs (Most Recent):  Temp: 98 °F (36.7 °C) (19 1505)  Pulse: 79 (19 1805)  Resp: 15 (19 1805)  BP: 125/64 (19 1805)  SpO2: 97 % (19 1805) Vital Signs (24h Range):  Temp:  [98 °F (36.7 °C)-98.4 °F (36.9 °C)] 98 °F (36.7 °C)  Pulse:  [56-85] 79  Resp:  [14-26] 15  SpO2:  [94 %-100 %] 97 %  BP:  (119-189)/(62-92) 125/64     Date 04/26/19 0700 - 04/27/19 0659   Shift 8311-5599 8979-9087 7031-6539 24 Hour Total   INTAKE   I.V.(mL/kg) 300(3.9)   300(3.9)   Shift Total(mL/kg) 300(3.9)   300(3.9)   OUTPUT   Urine(mL/kg/hr) 1100(1.8) 150  1250   Shift Total(mL/kg) 1100(14.3) 150(1.9)  1250(16.2)   Weight (kg) 77.1 77.1 77.1 77.1       Neurosurgery Physical Exam  E4V5M6  Face symm  PERRLA  TM, CN 2-12 grossly normal  MACK, FCX4  Groin soft       Significant Labs:  Recent Labs   Lab 04/24/19 2356 04/26/19  0422 04/26/19  1400   * 90  --     137  --    K 4.0 3.4* 3.6    104  --    CO2 21* 25  --    BUN 13 10  --    CREATININE 0.8 0.7  --    CALCIUM 8.6* 8.3*  --    MG 2.0 2.0  --      Recent Labs   Lab 04/24/19 2356 04/26/19  0422   WBC 12.23 9.42   HGB 12.8 13.6   HCT 39.2 40.9    298         Assessment/Plan:     * SAH (subarachnoid hemorrhage)  63 year old female with a PMHx HTN, hypothyroidism presents as a transfer with a HH2 F4 SAH due to left hypophyseal saccular aneurysm, now s/p coil embolization on 4/17.    -Patient neurologically stable on exam  -Maintain head of bed > 30 degrees at all times  -Completed Keppra 500 BID x 7d for seizure prevention  -Continue TCDs daily x 14 days to monitor for vasospasm  -Continue Nimotop 60 mg q 4 hours x 21d for vasospasm prevention  -Continue permissive hypertension. Maintain SBP <200  -Maintain Na >135  -Strict I/O, net even to +500  -Daily PT/OT  -Notify NSGY immediately if any neurostatus change  -Will continue to follow  -Okay for stepdown to NSGY service         Uzma Celaya MD  Neurosurgery  [facility]

## 2019-04-27 NOTE — ASSESSMENT & PLAN NOTE
63 year old female with a PMHx HTN, hypothyroidism presents as a transfer with a HH2 F4 SAH due to left hypophyseal saccular aneurysm, now s/p coil embolization on 4/17.    -Patient neurologically stable on exam  -Maintain head of bed > 30 degrees at all times  -Completed Keppra 500 BID x 7d for seizure prevention  -Continue TCDs daily x 14 days to monitor for vasospasm  -Continue Nimotop 60 mg q 4 hours x 21d for vasospasm prevention  -Continue permissive hypertension. Maintain SBP <200  -Maintain Na >135  -Strict I/O, net even to +500  -Daily PT/OT  -Notify NSGY immediately if any neurostatus change  -Will continue to follow  -Okay for stepdown to NSGY service

## 2019-04-27 NOTE — PLAN OF CARE
Problem: Adult Inpatient Plan of Care  Goal: Plan of Care Review  POC reviewed with pt and family at bedside, verbalized understanding. VSS. No c/o pain or signs of distress noted. Neuro exam remains unchanged. Fall precautions maintained. Pt advised to call staff for assistance, call light is within reach. Will continue to monitor, with all safety measures met.

## 2019-04-28 VITALS
DIASTOLIC BLOOD PRESSURE: 69 MMHG | BODY MASS INDEX: 26.68 KG/M2 | HEART RATE: 71 BPM | SYSTOLIC BLOOD PRESSURE: 132 MMHG | RESPIRATION RATE: 18 BRPM | WEIGHT: 170 LBS | HEIGHT: 67 IN | OXYGEN SATURATION: 95 % | TEMPERATURE: 97 F

## 2019-04-28 LAB
ALBUMIN SERPL BCP-MCNC: 3.2 G/DL (ref 3.5–5.2)
ALP SERPL-CCNC: 69 U/L (ref 55–135)
ALT SERPL W/O P-5'-P-CCNC: 44 U/L (ref 10–44)
ANION GAP SERPL CALC-SCNC: 10 MMOL/L (ref 8–16)
AST SERPL-CCNC: 17 U/L (ref 10–40)
BASOPHILS # BLD AUTO: 0.05 K/UL (ref 0–0.2)
BASOPHILS NFR BLD: 0.5 % (ref 0–1.9)
BILIRUB SERPL-MCNC: 0.5 MG/DL (ref 0.1–1)
BUN SERPL-MCNC: 9 MG/DL (ref 8–23)
CALCIUM SERPL-MCNC: 8.8 MG/DL (ref 8.7–10.5)
CHLORIDE SERPL-SCNC: 104 MMOL/L (ref 95–110)
CO2 SERPL-SCNC: 23 MMOL/L (ref 23–29)
CREAT SERPL-MCNC: 0.8 MG/DL (ref 0.5–1.4)
DIFFERENTIAL METHOD: ABNORMAL
EOSINOPHIL # BLD AUTO: 0.3 K/UL (ref 0–0.5)
EOSINOPHIL NFR BLD: 2.5 % (ref 0–8)
ERYTHROCYTE [DISTWIDTH] IN BLOOD BY AUTOMATED COUNT: 13.4 % (ref 11.5–14.5)
EST. GFR  (AFRICAN AMERICAN): >60 ML/MIN/1.73 M^2
EST. GFR  (NON AFRICAN AMERICAN): >60 ML/MIN/1.73 M^2
GLUCOSE SERPL-MCNC: 105 MG/DL (ref 70–110)
HCT VFR BLD AUTO: 40.3 % (ref 37–48.5)
HGB BLD-MCNC: 13.3 G/DL (ref 12–16)
IMM GRANULOCYTES # BLD AUTO: 0.19 K/UL (ref 0–0.04)
IMM GRANULOCYTES NFR BLD AUTO: 1.9 % (ref 0–0.5)
LYMPHOCYTES # BLD AUTO: 2.7 K/UL (ref 1–4.8)
LYMPHOCYTES NFR BLD: 26.5 % (ref 18–48)
MAGNESIUM SERPL-MCNC: 2 MG/DL (ref 1.6–2.6)
MCH RBC QN AUTO: 30.1 PG (ref 27–31)
MCHC RBC AUTO-ENTMCNC: 33 G/DL (ref 32–36)
MCV RBC AUTO: 91 FL (ref 82–98)
MONOCYTES # BLD AUTO: 0.9 K/UL (ref 0.3–1)
MONOCYTES NFR BLD: 9.3 % (ref 4–15)
NEUTROPHILS # BLD AUTO: 6 K/UL (ref 1.8–7.7)
NEUTROPHILS NFR BLD: 59.3 % (ref 38–73)
NRBC BLD-RTO: 0 /100 WBC
PHOSPHATE SERPL-MCNC: 3.6 MG/DL (ref 2.7–4.5)
PLATELET # BLD AUTO: 328 K/UL (ref 150–350)
PMV BLD AUTO: 9.4 FL (ref 9.2–12.9)
POTASSIUM SERPL-SCNC: 3.7 MMOL/L (ref 3.5–5.1)
PROT SERPL-MCNC: 6.2 G/DL (ref 6–8.4)
RBC # BLD AUTO: 4.42 M/UL (ref 4–5.4)
SODIUM SERPL-SCNC: 137 MMOL/L (ref 136–145)
WBC # BLD AUTO: 10.09 K/UL (ref 3.9–12.7)

## 2019-04-28 PROCEDURE — 85025 COMPLETE CBC W/AUTO DIFF WBC: CPT

## 2019-04-28 PROCEDURE — 25000003 PHARM REV CODE 250: Performed by: NURSE PRACTITIONER

## 2019-04-28 PROCEDURE — 83735 ASSAY OF MAGNESIUM: CPT

## 2019-04-28 PROCEDURE — 84100 ASSAY OF PHOSPHORUS: CPT

## 2019-04-28 PROCEDURE — 80053 COMPREHEN METABOLIC PANEL: CPT

## 2019-04-28 PROCEDURE — 63600175 PHARM REV CODE 636 W HCPCS: Performed by: NURSE PRACTITIONER

## 2019-04-28 PROCEDURE — 36415 COLL VENOUS BLD VENIPUNCTURE: CPT

## 2019-04-28 PROCEDURE — 25000003 PHARM REV CODE 250: Performed by: PSYCHIATRY & NEUROLOGY

## 2019-04-28 RX ORDER — NIMODIPINE 30 MG/1
60 CAPSULE, LIQUID FILLED ORAL EVERY 4 HOURS
Qty: 252 CAPSULE | Refills: 0 | Status: ON HOLD | OUTPATIENT
Start: 2019-04-16 | End: 2020-10-27 | Stop reason: HOSPADM

## 2019-04-28 RX ORDER — OXYCODONE HYDROCHLORIDE 5 MG/1
5 TABLET ORAL EVERY 6 HOURS PRN
Qty: 15 TABLET | Refills: 0 | Status: SHIPPED | OUTPATIENT
Start: 2019-04-28 | End: 2020-09-03

## 2019-04-28 RX ADMIN — ATORVASTATIN CALCIUM 10 MG: 10 TABLET, FILM COATED ORAL at 09:04

## 2019-04-28 RX ADMIN — NIMODIPINE 60 MG: 30 CAPSULE, LIQUID FILLED ORAL at 05:04

## 2019-04-28 RX ADMIN — ACETAMINOPHEN 650 MG: 325 TABLET ORAL at 05:04

## 2019-04-28 RX ADMIN — HEPARIN SODIUM 5000 UNITS: 5000 INJECTION, SOLUTION INTRAVENOUS; SUBCUTANEOUS at 05:04

## 2019-04-28 RX ADMIN — LEVOTHYROXINE SODIUM 100 MCG: 50 TABLET ORAL at 05:04

## 2019-04-28 RX ADMIN — NIMODIPINE 60 MG: 30 CAPSULE, LIQUID FILLED ORAL at 01:04

## 2019-04-28 RX ADMIN — NIMODIPINE 60 MG: 30 CAPSULE, LIQUID FILLED ORAL at 09:04

## 2019-04-28 NOTE — PROGRESS NOTES
[facility]  Neurosurgery  Progress Note    Subjective:     History of Present Illness: 63 year old female with PMHx HTN, hypothyroidism presents as a transfer from [facility] for evaluation of left ICA saccular aneurysm. Patient's family at bedside providing majority of history. Patient reports severe posterior headache, neck pain, photophobia starting at 930am. Subsequently brought to the ED by her family. Denies weakness, numbness, paresthesias, seizure activity, LOC, falls/trauma. Family at bedside state patient's father  in his 30s of intracerebral aneurysmal rupture. Transferred to [facility] for higher level of care. Denies use of anticoagulants or antiplatelet therapy. Reports compliance with BP medication however family states SBP is usually ~150s at home. Review of coags at OSH all within normal limits: aPTT 28, INR 0.9, PT 12.7.    Post-Op Info:  Procedure(s) (LRB):  ANGIOGRAM-CEREBRAL (Bilateral)   10 Days Post-Op         Medications:  Continuous Infusions:    Scheduled Meds:   atorvastatin  10 mg Oral Daily    heparin (porcine)  5,000 Units Subcutaneous Q8H    levothyroxine  100 mcg Oral Before breakfast    niMODipine  60 mg Oral Q4H    polyethylene glycol  17 g Oral Daily    senna-docusate 8.6-50 mg  1 tablet Oral BID     PRN Meds:acetaminophen, hydrALAZINE, methylPREDNISolone sodium succinate, ondansetron, oxyCODONE, sodium chloride 0.9%     Review of Systems    Objective:     Weight: 77.1 kg (169 lb 15.6 oz)  Body mass index is 26.62 kg/m².  Vital Signs (Most Recent):  Temp: 98.3 °F (36.8 °C) (19)  Pulse: 74 (19)  Resp: 18 (19)  BP: (!) 145/67 (19)  SpO2: 97 % (19) Vital Signs (24h Range):  Temp:  [98.1 °F (36.7 °C)-98.9 °F (37.2 °C)] 98.3 °F (36.8 °C)  Pulse:  [63-80] 74  Resp:  [16-18] 18  SpO2:  [94 %-99 %] 97 %  BP: (118-146)/(64-88) 145/67     Date 19 0700 - 19 0659   Shift 6989-7574 2544-4393  3357-8206 24 Hour Total   INTAKE   P.O. 480 360  840   Shift Total(mL/kg) 480(6.2) 360(4.7)  840(10.9)   OUTPUT   Shift Total(mL/kg)       Weight (kg) 77.1 77.1 77.1 77.1       Neurosurgery Physical Exam  E4V5M6  Face symm  PERRLA  TM, CN 2-12 grossly normal  MACK, FCX4  Groin soft       Significant Labs:  Recent Labs   Lab 04/26/19  0422 04/26/19  1400 04/27/19  0433   GLU 90  --  100     --  136   K 3.4* 3.6 3.8     --  104   CO2 25  --  21*   BUN 10  --  9   CREATININE 0.7  --  0.8   CALCIUM 8.3*  --  8.8   MG 2.0  --  1.9     Recent Labs   Lab 04/26/19  0422 04/27/19  0433   WBC 9.42 10.81   HGB 13.6 13.3   HCT 40.9 39.2    316         Assessment/Plan:     * SAH (subarachnoid hemorrhage)  63 year old female with a PMHx HTN, hypothyroidism presents as a transfer with a HH2 F4 SAH due to left hypophyseal saccular aneurysm, now s/p coil embolization on 4/17. PBD 11.    --Patient neurologically stable on exam  --Will obtain potentially final TCD study today.  --Pending stability of above and clinic exam, pt may be discharge home tomorrow with plans for interval angiography in 6-8 weeks.  --Continue Nimotop 60q4 until PBD 21.  --Continue permissive hypertension. Maintain SBP <200  --We will continue to monitor closely please contact us with any questions or concerns.         Chaim Choudhury MD  Neurosurgery  [facility]

## 2019-04-28 NOTE — SUBJECTIVE & OBJECTIVE
Medications:  Continuous Infusions:    Scheduled Meds:   atorvastatin  10 mg Oral Daily    heparin (porcine)  5,000 Units Subcutaneous Q8H    levothyroxine  100 mcg Oral Before breakfast    niMODipine  60 mg Oral Q4H    polyethylene glycol  17 g Oral Daily    senna-docusate 8.6-50 mg  1 tablet Oral BID     PRN Meds:acetaminophen, hydrALAZINE, methylPREDNISolone sodium succinate, ondansetron, oxyCODONE, sodium chloride 0.9%     Review of Systems    Objective:     Weight: 77.1 kg (169 lb 15.6 oz)  Body mass index is 26.62 kg/m².  Vital Signs (Most Recent):  Temp: 98.3 °F (36.8 °C) (04/27/19 1920)  Pulse: 74 (04/27/19 1920)  Resp: 18 (04/27/19 1920)  BP: (!) 145/67 (04/27/19 1920)  SpO2: 97 % (04/27/19 1920) Vital Signs (24h Range):  Temp:  [98.1 °F (36.7 °C)-98.9 °F (37.2 °C)] 98.3 °F (36.8 °C)  Pulse:  [63-80] 74  Resp:  [16-18] 18  SpO2:  [94 %-99 %] 97 %  BP: (118-146)/(64-88) 145/67     Date 04/27/19 0700 - 04/28/19 0659   Shift 5259-3014 7932-1124 7200-8677 24 Hour Total   INTAKE   P.O. 480 360  840   Shift Total(mL/kg) 480(6.2) 360(4.7)  840(10.9)   OUTPUT   Shift Total(mL/kg)       Weight (kg) 77.1 77.1 77.1 77.1       Neurosurgery Physical Exam  E4V5M6  Face symm  PERRLA  TM, CN 2-12 grossly normal  MACK, FCX4  Groin soft       Significant Labs:  Recent Labs   Lab 04/26/19  0422 04/26/19  1400 04/27/19  0433   GLU 90  --  100     --  136   K 3.4* 3.6 3.8     --  104   CO2 25  --  21*   BUN 10  --  9   CREATININE 0.7  --  0.8   CALCIUM 8.3*  --  8.8   MG 2.0  --  1.9     Recent Labs   Lab 04/26/19  0422 04/27/19  0433   WBC 9.42 10.81   HGB 13.6 13.3   HCT 40.9 39.2    316

## 2019-04-28 NOTE — ASSESSMENT & PLAN NOTE
63 year old female with a PMHx HTN, hypothyroidism presents as a transfer with a HH2 F4 SAH due to left hypophyseal saccular aneurysm, now s/p coil embolization on 4/17. PBD 11.    Pt with stable TCDs. Will be discharged home today.    --Patient neurologically stable on exam  --Will obtain potentially final TCD study today.  --Pending stability of above and clinic exam, pt may be discharge home tomorrow with plans for interval angiography in 6-8 weeks.  --Continue Nimotop 60q4 until PBD 21.  --Continue permissive hypertension. Maintain SBP <200  --We will continue to monitor closely please contact us with any questions or concerns.

## 2019-04-28 NOTE — NURSING
Patient discharged to home via private auto accompanied sisters . Discharge instructions given all question answer. Informed to return to ED for any changes. Denies any pain or discomfort upon leaving the unit via wheelchair and patient escort.

## 2019-04-28 NOTE — PLAN OF CARE
Problem: Adult Inpatient Plan of Care  Goal: Plan of Care Review  POC reviewed with pt. Verbalized understanding. VSS. Denies pain or discomfort. Neuro exam remains unchanged. Fall precautions maintained. Pt advised to call staff for assistance, call light is within reach. Will continue to monitor, with all safety measures met.

## 2019-04-28 NOTE — DISCHARGE SUMMARY
[facility]  Neurosurgery  Discharge Summary      Patient Name: Adriana Fonseca  MRN: [id number]  Admission Date: 2019  Hospital Length of Stay: 12 days  Discharge Date and Time:  2019 5:57 AM  Attending Physician: Ivan Morales MD   Discharging Provider: Chaim Choudhury MD  Primary Care Provider: James Dunn MD    HPI:   63 year old female with PMHx HTN, hypothyroidism presents as a transfer from [facility] for evaluation of left ICA saccular aneurysm. Patient's family at bedside providing majority of history. Patient reports severe posterior headache, neck pain, photophobia starting at 930am. Subsequently brought to the ED by her family. Denies weakness, numbness, paresthesias, seizure activity, LOC, falls/trauma. Family at bedside state patient's father  in his 30s of intracerebral aneurysmal rupture. Transferred to [facility] for higher level of care. Denies use of anticoagulants or antiplatelet therapy. Reports compliance with BP medication however family states SBP is usually ~150s at home. Review of coags at OSH all within normal limits: aPTT 28, INR 0.9, PT 12.7.    Procedure(s) (LRB):  ANGIOGRAM-CEREBRAL (Bilateral)     Hospital Course: : Taken to cath  for DSA with coil embolization of L hypophyseal aneurysm. NAEON. Intact on exam. Reports some neck pain and HA. Groin soft. TCD pending for today.    -: NAEON. No issues per nursing. HA improving. TCD without sonographic evidence for vasospasm. Intact on exam.   -: Intact on exam and no issues. TCD without sonographic evidence for vasospasm    Consults:   Consults (From admission, onward)        Status Ordering Provider     Inpatient consult to Midline team  Once     Provider:  (Not yet assigned)    Completed WILSON WHITLOCK     Inpatient consult to Neurosurgery  Once     Provider:  (Not yet assigned)    Acknowledged GOGO DAVIS     Inpatient consult to Physical Medicine Rehab  Once      Provider:  (Not yet assigned)    Completed ROXANE BUNN     Inpatient consult to PICC team (NIAS)  Once     Provider:  (Not yet assigned)    Completed CELINE HAUSER     Inpatient consult to Registered Dietitian/Nutritionist  Once     Provider:  (Not yet assigned)    Completed ROXANE BUNN     Inpatient consult to Social Work/Case Management  Once     Provider:  (Not yet assigned)    Acknowledged ROXANE BUNN     Inpatient consult to Vascular (Stroke) Neurology  Once     Provider:  (Not yet assigned)    Completed ROXANE BUNN          Significant Diagnostic Studies: Labs: All labs within the past 24 hours have been reviewed    Pending Diagnostic Studies:     Procedure Component Value Units Date/Time    Comprehensive metabolic panel [[id number]] Collected:  04/28/19 0425    Order Status:  Sent Lab Status:  In process Updated:  04/28/19 0500    Specimen:  Blood     Magnesium [[id number]] Collected:  04/28/19 0425    Order Status:  Sent Lab Status:  In process Updated:  04/28/19 0500    Specimen:  Blood     Phosphorus [[id number]] Collected:  04/28/19 0425    Order Status:  Sent Lab Status:  In process Updated:  04/28/19 0500    Specimen:  Blood     US Transcran Doppler Intracran Artr Comp [[id number]]     Order Status:  Sent Lab Status:  No result     US Transcran Doppler Intracran Artr Comp [[id number]] Resulted:  04/27/19 1134    Order Status:  Sent Lab Status:  In process Updated:  04/27/19 1134        Final Active Diagnoses:    Diagnosis Date Noted POA    PRINCIPAL PROBLEM:  SAH (subarachnoid hemorrhage) [I60.9] 04/16/2019 Yes    Hyperlipidemia, mixed [E78.2] 04/20/2019 Unknown    GERD without esophagitis [K21.9] 04/20/2019 Unknown    Compensated respiratory alkalosis [E87.3] 04/19/2019 No    Seizure prophylaxis [Z29.8]  Not Applicable    Vasogenic cerebral edema [G93.6] 04/17/2019 Yes    S/P coil embolization of cerebral aneurysm [Z98.890] 04/17/2019 Not Applicable    Essential  hypertension [I10] 04/16/2019 Yes    Headache [R51] 04/16/2019 Yes    Thyroid disease [E07.9] 04/16/2019 Unknown      Problems Resolved During this Admission:      Discharged Condition: good    Disposition: Home or Self Care    Follow Up:  Follow-up Information     Ivan Morales MD In 2 weeks.    Specialty:  Neurosurgery  Why:  groin check  Contact information:  [address]  [facility] [id number] [phone]                 Patient Instructions:      Diet Adult Regular     Activity as tolerated     Medications:  Reconciled Home Medications:      Medication List      START taking these medications    niMODipine 30 MG Cap  Commonly known as:  NIMOTOP  Take 2 capsules (60 mg total) by mouth every 4 (four) hours. for 21 days     oxyCODONE 5 MG immediate release tablet  Commonly known as:  ROXICODONE  Take 1 tablet (5 mg total) by mouth every 6 (six) hours as needed.            Chaim Choudhury MD  Neurosurgery  [facility]

## 2019-04-28 NOTE — DISCHARGE INSTRUCTIONS
General 7 day Post Cerebral Angiogram Instructions      -Groin Care for the next 7 days:  -Shower daily but no soaking or submerging wound in water.  -Keep clean, dry, and open to air.  -Do not apply creams or ointments to the site.  -Call if the wound opens, drains, or becomes red.  -No spreading of legs or stretching groin muscles for one week (no sexual activity)  -No lifting greater than 10 pounds  -no driving

## 2019-04-28 NOTE — ASSESSMENT & PLAN NOTE
63 year old female with a PMHx HTN, hypothyroidism presents as a transfer with a HH2 F4 SAH due to left hypophyseal saccular aneurysm, now s/p coil embolization on 4/17. PBD 11.    --Patient neurologically stable on exam  --Will obtain potentially final TCD study today.  --Pending stability of above and clinic exam, pt may be discharge home tomorrow with plans for interval angiography in 6-8 weeks.  --Continue Nimotop 60q4 until PBD 21.  --Continue permissive hypertension. Maintain SBP <200  --We will continue to monitor closely please contact us with any questions or concerns.

## 2019-04-28 NOTE — PLAN OF CARE
Problem: Adult Inpatient Plan of Care  Goal: Plan of Care Review  Outcome: Ongoing (interventions implemented as appropriate)  POC review with patient. Denies any pain or discomfort.Vss. No changes in neuro assessment. No events this shift. alll safety precautions maintained.

## 2019-04-28 NOTE — NURSING
Spoke with US meneses in regards to stat order placed for Transcran Doppler ordered 4/27/2019 at 1837. Tech informed RN that this particular US is a speciality and it isn't done during the night. Primary nurse informed due to pending pt discharge contingent upon this scan.

## 2019-04-29 ENCOUNTER — TELEPHONE (OUTPATIENT)
Dept: NEUROSURGERY | Facility: CLINIC | Age: 64
End: 2019-04-29

## 2019-04-29 DIAGNOSIS — I60.9 SAH (SUBARACHNOID HEMORRHAGE): Primary | ICD-10-CM

## 2019-04-30 ENCOUNTER — PATIENT OUTREACH (OUTPATIENT)
Dept: ADMINISTRATIVE | Facility: CLINIC | Age: 64
End: 2019-04-30

## 2019-04-30 NOTE — PATIENT INSTRUCTIONS
Hemorrhagic Stroke: Subarachnoid Hemorrhage  A hemorrhagic stroke happens when a blood vessel in the brain ruptures or leaks. A blood vessel on the surface of the brain bursts (hemorrhages). This spills blood into the surrounding tissue. This type of stroke often happens suddenly, with little warning. It is the most serious of all types of strokes. A subarachnoid hemorrhage is a type of hemorrhagic stroke.     What happens during a subarachnoid hemorrhage?  This type of stroke happens when a major blood vessel bursts on the surface of the brain. Normally, the space between the brain and the skull is filled with a clear fluid. When the blood vessel bursts, blood flows into the space between the brain and the skull. The amount of fluid in this space increases and puts pressure on the brain. This pressure can damage nearby parts of the brain.  Most of these strokes happen when a cerebral aneurysm bursts. An aneurysm is a weak spot in the wall of a blood vessel. A bubble often forms in this weak spot. In some cases, a cerebral aneurysm causes pain or other symptoms. In most cases, though, an aneurysm causes no symptoms until it bursts.  What are the symptoms of a subarachnoid hemorrhage?  Any stroke is a medical emergency. If you have any of these symptoms, even if they seem to get better, call 911 right away:  · Sudden, excruciating headache with no known cause  · Nausea and vomiting (often with headache)  · Sudden confusion or decrease in alertness  · Trouble moving or loss of feeling, especially on the face, arm, or leg specifically on one side of the body  · Sudden trouble walking, dizziness, loss of balance or coordination  · Sudden trouble talking or understanding speech    · Sudden mood changes  · Sudden dimness, double vision, or loss of vision, particularly in one eye  · Eyes suddenly very sensitive to light  · Neck and shoulder pain or stiff neck  · Seizure  How is a hemorrhagic stroke treated?   The acute  phase lasts from the first minutes to hours after symptoms start. During this phase, treatment focuses on relieving pressure on the brain and preventing further damage. A procedure or surgery may be done to repair the ruptured (burst) aneurysm. A drain may be placed into the brain to relieve pressure. Medicines to control blood pressure may be given through an IV line. Seizure medicine is often given. Tests will likely be done to check for other aneurysms in the brain. If they are found, surgery may be done to reduce the risk that they will burst and bleed.  After the acute phase, treatment focuses on recovery. Long-term damage from a stroke can include paralysis, trouble speaking or understanding, and trouble thinking clearly. Rehabilitation therapy (rehab) can help reduce these effects and regain skills. Rehab starts in the hospital and generally continues in an inpatient or outpatient facility, and eventually at home. It will focus on regaining lost skills. It may include:  · Help regaining movement.  · Therapy for speech and language  · Help with swallowing  · Help reducing risk factors for another stroke, such as smoking  In addition, medicines that help prevent another stroke may be given. These include medicines to control blood pressure and prevent bleeding.  Date Last Reviewed: 8/26/2015  © 0582-2798 [employer]. [address] [place] [address], [place] [address]. All rights reserved. This information is not intended as a substitute for professional medical care. Always follow your healthcare professional's instructions.

## 2019-05-10 ENCOUNTER — TELEPHONE (OUTPATIENT)
Dept: NEUROSURGERY | Facility: CLINIC | Age: 64
End: 2019-05-10

## 2019-05-10 ENCOUNTER — TELEPHONE (OUTPATIENT)
Dept: NEUROSURGERY | Facility: HOSPITAL | Age: 64
End: 2019-05-10

## 2019-05-10 NOTE — TELEPHONE ENCOUNTER
Spoke with patient. Patient verbalized understanding of discharge instructions and medications. Follow up appointment discussed. Instructed patient to seek medical help via 911 if new symptoms occur. Patient relayed a couple of concerns which neuro surgery clinic called assisted patient.

## 2019-05-10 NOTE — TELEPHONE ENCOUNTER
I contacted the pt regarding questions and concerns she had surrounding medication and symptoms.   I was able to answer the pt concerns and informed her of her f/u with  with imaging scheduled before the appt

## 2019-06-10 ENCOUNTER — OFFICE VISIT (OUTPATIENT)
Dept: NEUROSURGERY | Facility: CLINIC | Age: 64
End: 2019-06-10
Payer: COMMERCIAL

## 2019-06-10 ENCOUNTER — HOSPITAL ENCOUNTER (OUTPATIENT)
Dept: RADIOLOGY | Facility: HOSPITAL | Age: 64
Discharge: HOME | End: 2019-06-10
Attending: NEUROLOGICAL SURGERY
Payer: COMMERCIAL

## 2019-06-10 VITALS
SYSTOLIC BLOOD PRESSURE: 134 MMHG | HEIGHT: 66 IN | HEART RATE: 70 BPM | DIASTOLIC BLOOD PRESSURE: 71 MMHG | BODY MASS INDEX: 32.62 KG/M2 | WEIGHT: 203 LBS | TEMPERATURE: 98 F

## 2019-06-10 DIAGNOSIS — I60.8 SUBARACHNOID HEMORRHAGE DUE TO RUPTURED ANEURYSM: Primary | ICD-10-CM

## 2019-06-10 DIAGNOSIS — I60.9 SAH (SUBARACHNOID HEMORRHAGE): ICD-10-CM

## 2019-06-10 PROCEDURE — 99999 PR PBB SHADOW E&M-EST. PATIENT-LVL III: CPT | Mod: PBBFAC,,, | Performed by: NEUROLOGICAL SURGERY

## 2019-06-10 PROCEDURE — 3075F SYST BP GE 130 - 139MM HG: CPT | Mod: CPTII,S$GLB,, | Performed by: NEUROLOGICAL SURGERY

## 2019-06-10 PROCEDURE — 99213 OFFICE O/P EST LOW 20 MIN: CPT | Mod: S$GLB,,, | Performed by: NEUROLOGICAL SURGERY

## 2019-06-10 PROCEDURE — 3008F PR BODY MASS INDEX (BMI) DOCUMENTED: ICD-10-PCS | Mod: CPTII,S$GLB,, | Performed by: NEUROLOGICAL SURGERY

## 2019-06-10 PROCEDURE — 70450 CT HEAD WITHOUT CONTRAST: ICD-10-PCS | Mod: 26,,, | Performed by: RADIOLOGY

## 2019-06-10 PROCEDURE — 3078F PR MOST RECENT DIASTOLIC BLOOD PRESSURE < 80 MM HG: ICD-10-PCS | Mod: CPTII,S$GLB,, | Performed by: NEUROLOGICAL SURGERY

## 2019-06-10 PROCEDURE — 3078F DIAST BP <80 MM HG: CPT | Mod: CPTII,S$GLB,, | Performed by: NEUROLOGICAL SURGERY

## 2019-06-10 PROCEDURE — 99999 PR PBB SHADOW E&M-EST. PATIENT-LVL III: ICD-10-PCS | Mod: PBBFAC,,, | Performed by: NEUROLOGICAL SURGERY

## 2019-06-10 PROCEDURE — 70450 CT HEAD/BRAIN W/O DYE: CPT | Mod: 26,,, | Performed by: RADIOLOGY

## 2019-06-10 PROCEDURE — 70450 CT HEAD/BRAIN W/O DYE: CPT | Mod: TC

## 2019-06-10 PROCEDURE — 3008F BODY MASS INDEX DOCD: CPT | Mod: CPTII,S$GLB,, | Performed by: NEUROLOGICAL SURGERY

## 2019-06-10 PROCEDURE — 99213 PR OFFICE/OUTPT VISIT, EST, LEVL III, 20-29 MIN: ICD-10-PCS | Mod: S$GLB,,, | Performed by: NEUROLOGICAL SURGERY

## 2019-06-10 PROCEDURE — 3075F PR MOST RECENT SYSTOLIC BLOOD PRESS GE 130-139MM HG: ICD-10-PCS | Mod: CPTII,S$GLB,, | Performed by: NEUROLOGICAL SURGERY

## 2019-06-10 RX ORDER — PANTOPRAZOLE SODIUM 40 MG/1
40 TABLET, DELAYED RELEASE ORAL 2 TIMES DAILY
Refills: 3 | COMMUNITY
Start: 2019-05-04

## 2019-06-10 RX ORDER — ATORVASTATIN CALCIUM 10 MG/1
10 TABLET, FILM COATED ORAL DAILY
Refills: 2 | COMMUNITY
Start: 2019-05-13

## 2019-06-10 RX ORDER — LEVOTHYROXINE SODIUM 100 UG/1
100 TABLET ORAL DAILY
Refills: 1 | COMMUNITY
Start: 2019-05-23

## 2019-06-10 NOTE — PROGRESS NOTES
This office note has been dictated.  Adriana Fonseca was seen in neurosurgical followup at the office this morning.  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  She was   transferred to [facility] for further care.  MRA and cerebral   angiography done here showed a 16 mm left internal carotid hypophyseal artery   aneurysm directed posteriorly and medially.  This was treated with endovascular   coiling by Dr. Cortez on 04/17/19.  The patient did well in the postoperative   period.  She was maintained in the Intensive Care Unit, had daily transcranial   Doppler studies done with no evidence for vasospasm and was able to be   discharged to home on 04/28/19.  Since being home, she has had continued   headache.  This seems to be gradually improving.  She is taking Tylenol.  She   notes some intermittent blurring of vision in the left eye, but says she is able   to read with the left eye.  She feels her thought processes may be a little   slowed down.  She has had no specific difficulties with speech, coordination or   balance.    On brief examination today, she is bright and alert and speaking well.  She is   asking questions appropriately.  Extraocular movements are good.  Pupils are   equal and reactive to light.  The left fundus shows no retinal hemorrhages and   the optic disk appears normal.  She is walking without difficulty.    CT scan of the head was done at [facility] today.  There is no evidence for   residual subarachnoid hemorrhage.  Ventricular size is normal.  The coiled   aneurysm is seen.  There is no evidence for cerebral ischemia, subdural hematoma   or other complicating factors.  I also reviewed the angiogram and coiling   procedure with the patient and her .    We will plan followup  angiography in about six months to look for any compaction   of the coils and I will plan to see her back then.      ADORE/FREDDY  dd: 06/10/2019 12:25:03 (CDT)  td: 06/11/2019 01:25:55 (CDT)  Doc ID   #[id number]  Job ID #[id number]    CC: Adriana Fonseca

## 2019-10-16 ENCOUNTER — TELEPHONE (OUTPATIENT)
Dept: INTERVENTIONAL RADIOLOGY/VASCULAR | Facility: HOSPITAL | Age: 64
End: 2019-10-16

## 2019-10-16 DIAGNOSIS — I60.8 SUBARACHNOID HEMORRHAGE DUE TO RUPTURED ANEURYSM: Primary | ICD-10-CM

## 2019-11-12 ENCOUNTER — TELEPHONE (OUTPATIENT)
Dept: INTERVENTIONAL RADIOLOGY/VASCULAR | Facility: HOSPITAL | Age: 64
End: 2019-11-12

## 2019-11-12 DIAGNOSIS — I60.8 SUBARACHNOID HEMORRHAGE DUE TO RUPTURED ANEURYSM: Primary | ICD-10-CM

## 2019-11-12 RX ORDER — HEPARIN SODIUM 1000 [USP'U]/ML
3000 INJECTION, SOLUTION INTRAVENOUS; SUBCUTANEOUS ONCE
Status: CANCELLED | OUTPATIENT
Start: 2019-11-12 | End: 2019-11-12

## 2019-11-12 RX ORDER — FENTANYL CITRATE 50 UG/ML
50 INJECTION, SOLUTION INTRAMUSCULAR; INTRAVENOUS
Status: CANCELLED | OUTPATIENT
Start: 2019-11-12

## 2019-11-12 RX ORDER — MIDAZOLAM HYDROCHLORIDE 1 MG/ML
1 INJECTION INTRAMUSCULAR; INTRAVENOUS
Status: CANCELLED | OUTPATIENT
Start: 2019-11-12

## 2019-11-13 ENCOUNTER — HOSPITAL ENCOUNTER (OUTPATIENT)
Facility: HOSPITAL | Age: 64
Discharge: HOME | End: 2019-11-13
Attending: PSYCHIATRY & NEUROLOGY | Admitting: PSYCHIATRY & NEUROLOGY
Payer: COMMERCIAL

## 2019-11-13 VITALS
RESPIRATION RATE: 18 BRPM | BODY MASS INDEX: 30.53 KG/M2 | WEIGHT: 190 LBS | HEART RATE: 59 BPM | TEMPERATURE: 98 F | HEIGHT: 66 IN | DIASTOLIC BLOOD PRESSURE: 63 MMHG | SYSTOLIC BLOOD PRESSURE: 138 MMHG | OXYGEN SATURATION: 97 %

## 2019-11-13 DIAGNOSIS — I60.8 SUBARACHNOID HEMORRHAGE DUE TO RUPTURED ANEURYSM: ICD-10-CM

## 2019-11-13 PROCEDURE — 63600175 PHARM REV CODE 636 W HCPCS: Performed by: FAMILY MEDICINE

## 2019-11-13 PROCEDURE — 63600175 PHARM REV CODE 636 W HCPCS: Performed by: PSYCHIATRY & NEUROLOGY

## 2019-11-13 PROCEDURE — 25500020 PHARM REV CODE 255: Performed by: PSYCHIATRY & NEUROLOGY

## 2019-11-13 RX ORDER — FENTANYL CITRATE 50 UG/ML
INJECTION, SOLUTION INTRAMUSCULAR; INTRAVENOUS CODE/TRAUMA/SEDATION MEDICATION
Status: COMPLETED | OUTPATIENT
Start: 2019-11-13 | End: 2019-11-13

## 2019-11-13 RX ORDER — SODIUM CHLORIDE 9 MG/ML
INJECTION, SOLUTION INTRAVENOUS CONTINUOUS
Status: DISCONTINUED | OUTPATIENT
Start: 2019-11-13 | End: 2019-11-13 | Stop reason: HOSPADM

## 2019-11-13 RX ORDER — IODIXANOL 320 MG/ML
100 INJECTION, SOLUTION INTRAVASCULAR
Status: COMPLETED | OUTPATIENT
Start: 2019-11-13 | End: 2019-11-13

## 2019-11-13 RX ORDER — MIDAZOLAM HYDROCHLORIDE 1 MG/ML
INJECTION INTRAMUSCULAR; INTRAVENOUS CODE/TRAUMA/SEDATION MEDICATION
Status: COMPLETED | OUTPATIENT
Start: 2019-11-13 | End: 2019-11-13

## 2019-11-13 RX ORDER — DILTIAZEM HYDROCHLORIDE 240 MG/1
180 CAPSULE, EXTENDED RELEASE ORAL DAILY
COMMUNITY
End: 2020-09-03

## 2019-11-13 RX ADMIN — IODIXANOL 55 ML: 320 INJECTION, SOLUTION INTRAVASCULAR at 04:11

## 2019-11-13 RX ADMIN — FENTANYL CITRATE 25 MCG: 50 INJECTION, SOLUTION INTRAMUSCULAR; INTRAVENOUS at 04:11

## 2019-11-13 RX ADMIN — MIDAZOLAM HYDROCHLORIDE 0.5 MG: 1 INJECTION, SOLUTION INTRAMUSCULAR; INTRAVENOUS at 03:11

## 2019-11-13 RX ADMIN — SODIUM CHLORIDE: 0.9 INJECTION, SOLUTION INTRAVENOUS at 01:11

## 2019-11-13 RX ADMIN — FENTANYL CITRATE 25 MCG: 50 INJECTION, SOLUTION INTRAMUSCULAR; INTRAVENOUS at 03:11

## 2019-11-13 RX ADMIN — MIDAZOLAM HYDROCHLORIDE 0.5 MG: 1 INJECTION, SOLUTION INTRAMUSCULAR; INTRAVENOUS at 04:11

## 2019-11-13 RX ADMIN — FENTANYL CITRATE 50 MCG: 50 INJECTION, SOLUTION INTRAMUSCULAR; INTRAVENOUS at 03:11

## 2019-11-13 RX ADMIN — MIDAZOLAM HYDROCHLORIDE 1 MG: 1 INJECTION, SOLUTION INTRAMUSCULAR; INTRAVENOUS at 03:11

## 2019-11-13 NOTE — H&P
Radiology History & Physical      SUBJECTIVE:     Chief Complaint:  Follow up cerebral angigoram    History of Present Illness:  Adriana Fonseca is a 64 y.o. female who presents for follow up cerebral angiogram for left ICA superior hypophyseal segment aneurysm.     Pt had ruptured left ICA aneurysm on 4/17/19 that was coiled. Now at baseline.       Past Medical History:   Diagnosis Date    Hyperlipemia     Hypertension     Thyroid disease      Past Surgical History:   Procedure Laterality Date    CEREBRAL ANGIOGRAM Bilateral 4/17/2019    Procedure: ANGIOGRAM-CEREBRAL;  Surgeon: Lamar Surgeon;  Location: [facility];  Service: Anesthesiology;  Laterality: Bilateral;    HYSTERECTOMY         Home Meds:   Prior to Admission medications    Medication Sig Start Date End Date Taking? Authorizing Provider   atorvastatin (LIPITOR) 10 MG tablet Take 10 mg by mouth once daily. 5/13/19  Yes Historical Provider, MD   diltiaZEM (DILACOR XR) 240 MG CDCR Take 180 mg by mouth once daily.   Yes Historical Provider, MD   levothyroxine (SYNTHROID) 100 MCG tablet Take 100 mcg by mouth once daily. 5/23/19  Yes Historical Provider, MD   pantoprazole (PROTONIX) 40 MG tablet Take 40 mg by mouth once daily. 5/4/19  Yes Historical Provider, MD   niMODipine (NIMOTOP) 30 MG Cap Take 2 capsules (60 mg total) by mouth every 4 (four) hours. for 21 days 4/16/19 5/7/19  Chaim Choudhury MD   oxyCODONE (ROXICODONE) 5 MG immediate release tablet Take 1 tablet (5 mg total) by mouth every 6 (six) hours as needed. 4/28/19   Chaim Choudhury MD   sunscreen (NEUTROGENA MOISTURE SPF15) Lotn Use SPF 30--50 sunscreens daily to sun exposed skin. 6/16/17   Historical Provider, MD     Anticoagulants/Antiplatelets: no anticoagulation    Allergies: Review of patient's allergies indicates:  No Known Allergies  Sedation History:  no adverse reactions    Review of Systems:   Hematological: no known coagulopathies  Respiratory: no shortness of breath  Cardiovascular: no  chest pain  Gastrointestinal: no abdominal pain  Genito-Urinary: no dysuria  Musculoskeletal: negative  Neurological: no TIA or stroke symptoms         OBJECTIVE:     Vital Signs (Most Recent)  Temp: 97.8 °F (36.6 °C) (11/13/19 1325)  Pulse: 65 (11/13/19 1325)  Resp: 16 (11/13/19 1325)  BP: (!) 158/67 (11/13/19 1325)  SpO2: 99 % (11/13/19 1325)    Physical Exam:  ASA: 2  Mallampati: 2    General: no acute distress  Mental Status: alert and oriented to person, place and time  HEENT: normocephalic, atraumatic  Chest: unlabored breathing  Heart: regular heart rate  Abdomen: nondistended  Extremity: moves all extremities    Laboratory  Lab Results   Component Value Date    INR 1.0 11/13/2019       Lab Results   Component Value Date    WBC 6.40 11/13/2019    HGB 14.2 11/13/2019    HCT 45.2 11/13/2019    MCV 90 11/13/2019     11/13/2019      Lab Results   Component Value Date     11/13/2019     11/13/2019    K 4.5 11/13/2019     11/13/2019    CO2 27 11/13/2019    BUN 12 11/13/2019    CREATININE 1.0 11/13/2019    CALCIUM 9.6 11/13/2019    MG 2.0 04/28/2019    ALT 23 11/13/2019    AST 21 11/13/2019    ALBUMIN 4.1 11/13/2019    BILITOT 0.8 11/13/2019       ASSESSMENT/PLAN:     Sedation Plan: Moderate sedation  Patient will undergo : Follow up angiogram.    Camilo Mccarthy MD  NeuroIR fellow.

## 2019-11-13 NOTE — PLAN OF CARE
Angiogram completed. NAD noted. 6fr angioseal deployed. Pt to be flat for 2 hours till 1805.  Dressing applied, CDI.  PT to be transferred to [facility]. Report given at bedside.

## 2019-11-13 NOTE — DISCHARGE INSTRUCTIONS
For scheduling: Call Irma at [phone]    For questions or concerns call: ANTOINETTE MON-FRI 8 AM- 5PM [phone]. Radiology resident on call [phone].    For immediate concerns that are not emergent, you may call our radiology clinic at: [phone]...

## 2019-11-14 NOTE — PROCEDURES
Radiology Post-Procedure Note    Pre Op Diagnosis: aneurysm (previous embolization)    Post Op Diagnosis: same    Procedure: Cerebral angiogram    Procedure performed by: Levi Cortez MD    Written Informed Consent Obtained: Yes    Specimen Removed: NO    Estimated Blood Loss: Minimal    Procedure report:     A 6F sheath was placed into the right femoral artery and a 5F Henry catheter was advanced into the aortic arch.  The left common carotid arteries were subselected and angiography of the brain was performed after injection into each of these vessels.    Preliminary interpretation: no recurrance.  Please see Imaging report for full details.    A right femoral artery angiogram was performed, the sheath removed and hemostasis achieved using angioseal.  No hematoma was present at the time of hemostasis.    The patient tolerated the procedure well.     Levi Cortez MD  Vascular and Interventional Neurology Staff  Director of [facility]  [facility]  [phone]

## 2019-11-14 NOTE — PROGRESS NOTES
Pt and family member acknowledged understanding of discharge instructions. VSS. Right groin site CDI. Pt discharged per unit and hospital protocol via wheelchair with transport staff and family member.

## 2019-12-25 NOTE — PROGRESS NOTES
[facility]  Vascular Neurology  Comprehensive Stroke Center  Progress Note    Assessment/Plan:     * SAH (subarachnoid hemorrhage)  Patient with complaint of sudden onset of HA this AM. Seen at [facility] for severe headache and photophobia. CTA completed which revealed left ICA saccular aneurysm. Patient transferred to [facility] for further neurological evaluation. Admitted to [facility]. NSx consulted.    MRI revealing SAH within interpeduncular fossa and the anterior basal cisterns. Small amount of IVH in the occipital horn of the R lateral ventricle. Patient s/p L ICA embolization w/ coiling completed in IR 4/17.    Continue Nimotop 60q4 x 21 days, and daily TCDs x 14 days, and Keppra 500bid x 7 days    Antithrombotics for secondary stroke prevention:  None due to aneurysm and potential SAH     Statins for secondary stroke prevention and hyperlipidemia, if present:   Continue Atorvastatin 40 mg     Aggressive risk factor modification: family hx of aneurysm, L ICA aneusym      Rehab efforts: PT/OT/SLP to evaluate and treat    Diagnostics ordered/pending: daily TCDs    VTE prophylaxis: None due to potential SAH; mechanical SCDs     BP parameters: SAH: Secured aneurysm, SBP < 200 per NICU      Vasogenic cerebral edema  Area of cytotoxic cerebral edema identified when reviewing brain imaging in the subarachnoid territory. There is not mass effect associated with it. We will continue to monitor the patients clinical exam for any worsening of symptoms which may indicate expansion of the stroke or the area of the edema resulting in the clinical change. The pattern is suggestive of L ICA aneurysm etiology.        Thyroid disease  TSH 0.864  Continue home medication     Headache  Complaints of severe onset of headache   Initial rating 10/10  No complaints on exam this AM  Management per primary     Essential hypertension  Stroke risk factor  SBP <200 for secured aneurysm  Sia gregorio'ed  Patient arrived via EMS from [facility] after falling in bathroom. Staff report fall unwitnessed and happened around 0230. Patient has dementia, A&Ox1 is patient norm. Patient on Eliquis unknown if patient hit head. Large laceration noted on right bicep. Patient changed into gown and attached to monitor. TNS [place] at bedside upon arrival.    per NCC   SBP within goal at this time          4/17 - MRI revealing subarachnoid blood within the interpeduncular fossa and anterior basal cisterns. Patient went to IR this AM for L ICA embolization w/ coiling. No complaints of headache on exam.  4/19 - NAEON, no headaches or vision changes. Neurologically stable.     STROKE DOCUMENTATION        NIH Scale:      NIH Stroke Scale     1a. Level of consciousness 0=alert; keenly responsive   1b. LOC questions 0=Answers both tasks correctly   1c. LOC commands 0=Answers both tasks correctly   2. Best gaze 0=normal   3. Visual 0=No visual loss   4.  Facial palsy 0=Normal symmetric movement   5.  Motor left arm 0=No drift, limb holds 90 (or 45) degrees for full 10 seconds   5b. Motor right arm 0=No drift, limb holds 90 (or 45) degrees for full 10 seconds   6a. Motor left leg 0=No drift, limb holds 90 (or 45) degrees for full 10 seconds   6b. Motor right leg 0=No drift, limb holds 90 (or 45) degrees for full 10 seconds   7. Limb ataxia 0=Absent   8. Sensory 0=Normal; no sensory loss   9. Best language 0=No aphasia, normal   10. Dysarthria 0=Normal   11.  Extinction and inattention 0=No abnormality                                TOTAL: 0       Modified [place] Score: 0  [place]nez Coma Scale:    ABCD2 Score:    MZDH1CG2-WHT Score:   HAS -BLED Score:   ICH Score:   Hunt & Roth Classification:Grade I     Hemorrhagic change of an Ischemic Stroke: Does this patient have an ischemic stroke with hemorrhagic changes? No     Neurologic Chief Complaint: headache, s/p coiling of L ICA aneurysm    Subjective:     Interval History: Patient is seen for follow-up neurological assessment and treatment recommendations: NAEON. Denies any headaches, neurologically stable.     HPI, Past Medical, Family, and Social History remains the same as documented in the initial encounter.     Review of Systems  Scheduled Meds:   heparin (porcine)  5,000 Units Subcutaneous Q8H    levETIRAcetam  500 mg Oral  BID    niMODipine  60 mg Oral Q4H    polyethylene glycol  17 g Oral Daily    scopolamine  1 patch Transdermal Once    senna-docusate 8.6-50 mg  1 tablet Oral BID     Continuous Infusions:   sodium chloride 0.9% 75 mL/hr at 04/19/19 0701     PRN Meds:acetaminophen, hydrALAZINE, magnesium oxide, magnesium oxide, methylPREDNISolone sodium succinate, ondansetron, oxyCODONE, potassium, sodium phosphates, potassium, sodium phosphates, potassium, sodium phosphates, sodium chloride 0.9%, sodium chloride 0.9%    Objective:     Vital Signs (Most Recent):  Temp: 99.1 °F (37.3 °C) (04/19/19 0701)  Pulse: 80 (04/19/19 0901)  Resp: (!) 27 (04/19/19 0901)  BP: (!) 154/67 (04/19/19 0901)  SpO2: 96 % (04/19/19 0901)  BP Location: Left arm    Vital Signs Range (Last 24H):  Temp:  [98.2 °F (36.8 °C)-99.1 °F (37.3 °C)]   Pulse:  [68-89]   Resp:  [9-28]   BP: (109-163)/(56-78)   SpO2:  [93 %-99 %]   Arterial Line BP: ()/()   BP Location: Left arm    Physical Exam  Constitutional: She is oriented to person, place, and time. She appears well-developed.   HENT:   Head: Normocephalic and atraumatic.   Eyes: Pupils are equal, round, and reactive to light. EOM are normal.   Cardiovascular: Normal rate.   Pulmonary/Chest: Effort normal.   Musculoskeletal: Normal range of motion.   Neurological: She is alert and oriented to person, place, and time.   Skin: Skin is warm. Capillary refill takes less than 2 seconds.   Psychiatric: She has a normal mood and affect.         Neurological Exam:   LOC: alert  Attention Span: Good   Language: No aphasia  Articulation: No dysarthria  Orientation: Person, Place, Time   Visual Fields: Full  EOM (CN III, IV, VI): Full/intact  Pupils (CN II, III): PERRL  Facial Sensation (CN V): Normal  Motor: Arm left  Normal 5/5  Leg left  Normal 5/5  Arm right  Normal 5/5  Leg right Normal 5/5  Cebellar: No evidence of appendicular or axial ataxia  Sensation: Intact to light touch, temperature and  vibration      Laboratory:  CMP:   Recent Labs   Lab 04/19/19  0322   CALCIUM 8.4*   ALBUMIN 3.3*   PROT 6.4      K 4.2   CO2 19*      BUN 9   CREATININE 0.7   ALKPHOS 63   ALT 17   AST 18   BILITOT 0.5     CBC:   Recent Labs   Lab 04/19/19  0322   WBC 7.64   RBC 4.26   HGB 12.5   HCT 37.8      MCV 89   MCH 29.3   MCHC 33.1     Lipid Panel:   Recent Labs   Lab 04/16/19 1817   CHOL 178   LDLCALC 101.8   HDL 62   TRIG 71     Hgb A1C:   Recent Labs   Lab 04/16/19 1817   HGBA1C 5.5     TSH:   Recent Labs   Lab 04/16/19 1817   TSH 0.864       Diagnostic Results     Brain Imaging  MRI MRA Brain w wo 4/16  1.6 cm saccular aneurysm arising from the supraclinoid segment of the left ICA.  No additional aneurysm is identified, allowing for the low sensitivity of MRA for small aneurysms.     Vessel Imaging   See above    Cardiac Imaging   TTE 4/17  · Concentric left ventricular remodeling.  · Normal left ventricular systolic function. The estimated ejection fraction is 60%  · Normal LV diastolic function.  · Normal right ventricular systolic function.  · Mild tricuspid regurgitation.  · Normal central venous pressure (3 mm Hg).  · The estimated PA systolic pressure is 40 mm Hg    Dispo: Pending further monitoring with daily TCDs and nimodipine for vasospasms s/p aneurysm cooling    Angeline Louie MD  [facility]  Department of Vascular Neurology   [facility]

## 2020-08-17 ENCOUNTER — TELEPHONE (OUTPATIENT)
Dept: NEUROSURGERY | Facility: CLINIC | Age: 65
End: 2020-08-17

## 2020-08-17 NOTE — TELEPHONE ENCOUNTER
----- Message from Amarjit Aden sent at 8/17/2020 10:16 AM CDT -----  Contact: pt @ [phone]  Pt states she's been having headaches/bottom part of neck hurting/circles around vision when driving at night/dizziness for the last week, asking to speak w/ staff regarding this

## 2020-08-19 ENCOUNTER — TELEPHONE (OUTPATIENT)
Dept: NEUROSURGERY | Facility: CLINIC | Age: 65
End: 2020-08-19

## 2020-08-19 NOTE — TELEPHONE ENCOUNTER
----- Message from Amarjit Aden sent at 8/19/2020  1:54 PM CDT -----  Contact: pt @ [phone]  Pt asking to speak w/ EJ to confirm if he's spoken w/ the doctor regarding her medical condition

## 2020-08-24 ENCOUNTER — TELEPHONE (OUTPATIENT)
Dept: NEUROSURGERY | Facility: CLINIC | Age: 65
End: 2020-08-24

## 2020-08-24 NOTE — TELEPHONE ENCOUNTER
----- Message from Kevin Francois sent at 8/24/2020 10:07 AM CDT -----  Contact: Pt @ [phone]  Patient calling to r/s the 8-24th appt, henok call

## 2020-09-03 ENCOUNTER — OFFICE VISIT (OUTPATIENT)
Dept: NEUROSURGERY | Facility: CLINIC | Age: 65
End: 2020-09-03
Payer: COMMERCIAL

## 2020-09-03 VITALS
WEIGHT: 190 LBS | SYSTOLIC BLOOD PRESSURE: 157 MMHG | DIASTOLIC BLOOD PRESSURE: 86 MMHG | TEMPERATURE: 97 F | HEART RATE: 62 BPM | BODY MASS INDEX: 30.53 KG/M2 | HEIGHT: 66 IN

## 2020-09-03 DIAGNOSIS — I60.8 SUBARACHNOID HEMORRHAGE DUE TO CEREBRAL ANEURYSM: Primary | ICD-10-CM

## 2020-09-03 PROCEDURE — 99999 PR PBB SHADOW E&M-EST. PATIENT-LVL IV: ICD-10-PCS | Mod: PBBFAC,,, | Performed by: NEUROLOGICAL SURGERY

## 2020-09-03 PROCEDURE — 99213 PR OFFICE/OUTPT VISIT, EST, LEVL III, 20-29 MIN: ICD-10-PCS | Mod: S$PBB,,, | Performed by: NEUROLOGICAL SURGERY

## 2020-09-03 PROCEDURE — 99213 OFFICE O/P EST LOW 20 MIN: CPT | Mod: S$PBB,,, | Performed by: NEUROLOGICAL SURGERY

## 2020-09-03 PROCEDURE — 99999 PR PBB SHADOW E&M-EST. PATIENT-LVL IV: CPT | Mod: PBBFAC,,, | Performed by: NEUROLOGICAL SURGERY

## 2020-09-03 PROCEDURE — 99214 OFFICE O/P EST MOD 30 MIN: CPT | Mod: PBBFAC | Performed by: NEUROLOGICAL SURGERY

## 2020-09-03 RX ORDER — SUCRALFATE 1 G/10ML
SUSPENSION ORAL
Status: ON HOLD | COMMUNITY
Start: 2020-08-28 | End: 2020-10-27 | Stop reason: HOSPADM

## 2020-09-03 RX ORDER — DILTIAZEM HYDROCHLORIDE 240 MG/1
240 CAPSULE, COATED, EXTENDED RELEASE ORAL DAILY
COMMUNITY
Start: 2020-08-03

## 2020-09-03 NOTE — PROGRESS NOTES
Adriana Fonseca was seen in neurosurgical follow-up at the office this morning.  She is a 65-year-old lady who presented with a subarachnoid hemorrhage on 04/16/2019 and was found to have a large left ICA-superior hypophyseal artery aneurysm.  This was successfully coiled by Dr. Cortez.  She returned on 11/13/19 for follow-up angiography.  She has not been seen in neurosurgical follow-up since that time.  With COVID 19 and other circumstances it has been hard for her to make a follow-up appointment.  She has had some persistent headache since the subarachnoid hemorrhage but in the last 2 weeks has complained of more acute severe headache at the base of the skull and associated with some blurring and halos in her vision.  She has had no other new focal deficits, no double vision, difficulty speaking, weakness of the arms or legs.    On physical examination she is alert and answers questions appropriately.  Examination the head shows some mild tenderness in the suboccipital area.  Eyes show full extraocular movements, pupils are equal reactive to light, fundi do not show papilledema.  Hearing is intact to finger rubbing bilaterally.  Finger-to-nose and gait are unremarkable cranial nerves otherwise intact she has normal facial sensation and movement strength extremities is good and sensation normal.    We reviewed the to angiograms the 1st showing the left internal carotid-superior hypophyseal artery aneurysm and the follow-up study showing complete occlusion of the aneurysm with coils and no evidence for recurrence.  No other aneurysms were found.    The etiology of her headaches is unclear.  She is to be seen in ophthalmology evaluation.  I will have follow-up MRI and MRA done to see if there are any new findings.  I will see her back with the studies.

## 2020-09-08 ENCOUNTER — TELEPHONE (OUTPATIENT)
Dept: NEUROSURGERY | Facility: CLINIC | Age: 65
End: 2020-09-08

## 2020-09-08 NOTE — TELEPHONE ENCOUNTER
----- Message from Paula Blake sent at 9/8/2020  9:56 AM CDT -----    Wants to talk about the appt she has scheduled on 10/5    Patient can be reached @# [phone]

## 2020-10-05 ENCOUNTER — HOSPITAL ENCOUNTER (OUTPATIENT)
Dept: RADIOLOGY | Facility: HOSPITAL | Age: 65
Discharge: HOME | End: 2020-10-05
Attending: NEUROLOGICAL SURGERY
Payer: MEDICARE

## 2020-10-05 ENCOUNTER — OFFICE VISIT (OUTPATIENT)
Dept: NEUROSURGERY | Facility: CLINIC | Age: 65
End: 2020-10-05
Payer: MEDICARE

## 2020-10-05 VITALS
HEIGHT: 66 IN | WEIGHT: 190 LBS | TEMPERATURE: 98 F | HEART RATE: 63 BPM | DIASTOLIC BLOOD PRESSURE: 71 MMHG | BODY MASS INDEX: 30.53 KG/M2 | SYSTOLIC BLOOD PRESSURE: 131 MMHG

## 2020-10-05 DIAGNOSIS — I60.8 SUBARACHNOID HEMORRHAGE DUE TO CEREBRAL ANEURYSM: ICD-10-CM

## 2020-10-05 DIAGNOSIS — I60.8 SUBARACHNOID HEMORRHAGE DUE TO CEREBRAL ANEURYSM: Primary | ICD-10-CM

## 2020-10-05 PROCEDURE — 70546 MRA BRAIN WITH AND WITHOUT: ICD-10-PCS | Mod: 26,59,, | Performed by: RADIOLOGY

## 2020-10-05 PROCEDURE — 99214 OFFICE O/P EST MOD 30 MIN: CPT | Mod: PBBFAC,25 | Performed by: NEUROLOGICAL SURGERY

## 2020-10-05 PROCEDURE — 99999 PR PBB SHADOW E&M-EST. PATIENT-LVL IV: ICD-10-PCS | Mod: PBBFAC,,, | Performed by: NEUROLOGICAL SURGERY

## 2020-10-05 PROCEDURE — 70546 MR ANGIOGRAPH HEAD W/O&W/DYE: CPT | Mod: 26,59,, | Performed by: RADIOLOGY

## 2020-10-05 PROCEDURE — 70553 MRI BRAIN W WO CONTRAST: ICD-10-PCS | Mod: 26,,, | Performed by: RADIOLOGY

## 2020-10-05 PROCEDURE — 25500020 PHARM REV CODE 255: Performed by: NEUROLOGICAL SURGERY

## 2020-10-05 PROCEDURE — 99213 PR OFFICE/OUTPT VISIT, EST, LEVL III, 20-29 MIN: ICD-10-PCS | Mod: S$PBB,,, | Performed by: NEUROLOGICAL SURGERY

## 2020-10-05 PROCEDURE — A9585 GADOBUTROL INJECTION: HCPCS | Performed by: NEUROLOGICAL SURGERY

## 2020-10-05 PROCEDURE — 70553 MRI BRAIN STEM W/O & W/DYE: CPT | Mod: TC

## 2020-10-05 PROCEDURE — 99213 OFFICE O/P EST LOW 20 MIN: CPT | Mod: S$PBB,,, | Performed by: NEUROLOGICAL SURGERY

## 2020-10-05 PROCEDURE — 70553 MRI BRAIN STEM W/O & W/DYE: CPT | Mod: 26,,, | Performed by: RADIOLOGY

## 2020-10-05 PROCEDURE — 70546 MR ANGIOGRAPH HEAD W/O&W/DYE: CPT | Mod: 59,TC

## 2020-10-05 PROCEDURE — 99999 PR PBB SHADOW E&M-EST. PATIENT-LVL IV: CPT | Mod: PBBFAC,,, | Performed by: NEUROLOGICAL SURGERY

## 2020-10-05 RX ORDER — GADOBUTROL 604.72 MG/ML
10 INJECTION INTRAVENOUS
Status: COMPLETED | OUTPATIENT
Start: 2020-10-05 | End: 2020-10-05

## 2020-10-05 RX ADMIN — GADOBUTROL 10 ML: 604.72 INJECTION INTRAVENOUS at 10:10

## 2020-10-05 NOTE — PROGRESS NOTES
Adriana Fonseca returned in neurosurgical followup to the office this morning.  She has continued with headaches at about the same intensity.  She was seen in Ophthalmology and found to have cataracts.  The one on the right is apparently quite dense and cataract extraction was recommended.  She has had no other focal neurological symptoms.    On brief examination today she is alert and cooperative.  Extraocular movements are good and pupils equal.  She says she is able to read print.  Speech is clear.  She shows no focal neurological deficit.    MRI and MRA of the brain were repeated at [facility] this morning.  On MRI ventricular size is normal.  There is no evidence for cerebral ischemia.  The brain appears normal.  The coils are also noted adjacent to the left internal carotid artery.  I see no clear etiology for headache.  MRA however shows some recurrence of the aneurysm about 4 mm into the coil pack.  The coils appeared to be flush on the postop angiogram last year.    As this was an aneurysm that presented with subarachnoid hemorrhage I believe it would be prudent to do follow-up angiography and either place additional coils or consider flow diversion.  I will place an order for IR cerebral angiography with intent to treat and see her back in follow up after the procedure.

## 2020-10-06 DIAGNOSIS — I67.1 CEREBRAL ANEURYSM, NONRUPTURED: Primary | ICD-10-CM

## 2020-10-06 RX ORDER — ASPIRIN 325 MG
325 TABLET, DELAYED RELEASE (ENTERIC COATED) ORAL DAILY
Qty: 30 TABLET | Refills: 6 | Status: ON HOLD | OUTPATIENT
Start: 2020-10-06 | End: 2020-10-27 | Stop reason: HOSPADM

## 2020-10-06 RX ORDER — CLOPIDOGREL BISULFATE 75 MG/1
75 TABLET ORAL DAILY
Qty: 30 TABLET | Refills: 5 | Status: ON HOLD | OUTPATIENT
Start: 2020-10-06 | End: 2020-10-27 | Stop reason: HOSPADM

## 2020-10-12 ENCOUNTER — TELEPHONE (OUTPATIENT)
Dept: NEUROSURGERY | Facility: CLINIC | Age: 65
End: 2020-10-12

## 2020-10-12 NOTE — TELEPHONE ENCOUNTER
----- Message from Gabriella Rivas sent at 10/12/2020 10:22 AM CDT -----  Regarding: pt advice  Contact: pt @ [phone]  Pt calling to speak with someone in Dr. Li's office regarding a procedure that is to be set up for her.please call.

## 2020-10-12 NOTE — TELEPHONE ENCOUNTER
SW IR SCHEDULED AND YUE PT, WILL NEED A FEW MORE DAYS TO OBTAIN A PROCEDURE DATE WITH AVAIL. ANESTHESIA. SHE IS AWARE. THE IR TEAM WILL CONTACT HER MID THIS WEEK TO SCHEDULE.

## 2020-10-19 ENCOUNTER — LAB VISIT (OUTPATIENT)
Dept: LAB | Facility: HOSPITAL | Age: 65
End: 2020-10-19
Attending: PSYCHIATRY & NEUROLOGY
Payer: MEDICARE

## 2020-10-19 DIAGNOSIS — I67.1 CEREBRAL ANEURYSM, NONRUPTURED: ICD-10-CM

## 2020-10-19 LAB
PA AA PRP-ACNC: 409 ARU (ref 620–672)
PLATELET RESPONSE PLAVIX: 214 PRU (ref 194–418)

## 2020-10-19 PROCEDURE — 85576 BLOOD PLATELET AGGREGATION: CPT

## 2020-10-19 PROCEDURE — 85576 BLOOD PLATELET AGGREGATION: CPT | Mod: 91

## 2020-10-19 PROCEDURE — 36415 COLL VENOUS BLD VENIPUNCTURE: CPT

## 2020-10-20 DIAGNOSIS — Z01.818 PREPROCEDURAL EXAMINATION: Primary | ICD-10-CM

## 2020-10-23 ENCOUNTER — LAB VISIT (OUTPATIENT)
Dept: URGENT CARE | Facility: CLINIC | Age: 65
End: 2020-10-23
Payer: MEDICARE

## 2020-10-23 VITALS — TEMPERATURE: 98 F | OXYGEN SATURATION: 96 % | HEART RATE: 71 BPM

## 2020-10-23 DIAGNOSIS — Z01.818 PREPROCEDURAL EXAMINATION: ICD-10-CM

## 2020-10-23 DIAGNOSIS — I60.8 SUBARACHNOID HEMORRHAGE DUE TO RUPTURED ANEURYSM: Primary | ICD-10-CM

## 2020-10-23 LAB — SARS-COV-2 RNA RESP QL NAA+PROBE: NOT DETECTED

## 2020-10-23 PROCEDURE — U0003 INFECTIOUS AGENT DETECTION BY NUCLEIC ACID (DNA OR RNA); SEVERE ACUTE RESPIRATORY SYNDROME CORONAVIRUS 2 (SARS-COV-2) (CORONAVIRUS DISEASE [COVID-19]), AMPLIFIED PROBE TECHNIQUE, MAKING USE OF HIGH THROUGHPUT TECHNOLOGIES AS DESCRIBED BY CMS-2020-01-R: HCPCS

## 2020-10-26 ENCOUNTER — ANESTHESIA EVENT (OUTPATIENT)
Dept: INTERVENTIONAL RADIOLOGY/VASCULAR | Facility: HOSPITAL | Age: 65
DRG: 027 | End: 2020-10-26
Payer: MEDICARE

## 2020-10-26 ENCOUNTER — HOSPITAL ENCOUNTER (OUTPATIENT)
Dept: INTERVENTIONAL RADIOLOGY/VASCULAR | Facility: HOSPITAL | Age: 65
Discharge: HOME | DRG: 027 | End: 2020-10-26
Attending: PSYCHIATRY & NEUROLOGY | Admitting: PSYCHIATRY & NEUROLOGY
Payer: COMMERCIAL

## 2020-10-26 ENCOUNTER — HOSPITAL ENCOUNTER (INPATIENT)
Facility: HOSPITAL | Age: 65
LOS: 1 days | Discharge: HOME | DRG: 027 | End: 2020-10-27
Attending: PSYCHIATRY & NEUROLOGY | Admitting: PSYCHIATRY & NEUROLOGY
Payer: MEDICARE

## 2020-10-26 DIAGNOSIS — I67.1 CEREBRAL ANEURYSM, NONRUPTURED: ICD-10-CM

## 2020-10-26 DIAGNOSIS — I72.9: ICD-10-CM

## 2020-10-26 PROCEDURE — 36224 PLACE CATH CAROTD ART: CPT | Mod: LT

## 2020-10-26 PROCEDURE — 75898 FOLLOW-UP ANGIOGRAPHY: CPT | Mod: TC

## 2020-10-26 PROCEDURE — D9220A PRA ANESTHESIA: Mod: CRNA,,, | Performed by: REGISTERED NURSE

## 2020-10-26 PROCEDURE — 99223 1ST HOSP IP/OBS HIGH 75: CPT | Mod: AI,,, | Performed by: NURSE PRACTITIONER

## 2020-10-26 PROCEDURE — D9220A PRA ANESTHESIA: ICD-10-PCS | Mod: ANES,,, | Performed by: STUDENT IN AN ORGANIZED HEALTH CARE EDUCATION/TRAINING PROGRAM

## 2020-10-26 PROCEDURE — 61624 TCAT PERM OCCLS/EMBOLJ CNS: CPT | Mod: S$GLB,,, | Performed by: PSYCHIATRY & NEUROLOGY

## 2020-10-26 PROCEDURE — 25000003 PHARM REV CODE 250: Performed by: FAMILY MEDICINE

## 2020-10-26 PROCEDURE — 25000003 PHARM REV CODE 250: Performed by: PSYCHIATRY & NEUROLOGY

## 2020-10-26 PROCEDURE — 99223 PR INITIAL HOSPITAL CARE,LEVL III: ICD-10-PCS | Mod: AI,,, | Performed by: NURSE PRACTITIONER

## 2020-10-26 PROCEDURE — D9220A PRA ANESTHESIA: ICD-10-PCS | Mod: CRNA,,, | Performed by: REGISTERED NURSE

## 2020-10-26 PROCEDURE — 75898 PR  ANGIOGRAM,F/U STUDY,CATH THER/EMBOL/INF: ICD-10-PCS | Mod: 26,,, | Performed by: PSYCHIATRY & NEUROLOGY

## 2020-10-26 PROCEDURE — 63600175 PHARM REV CODE 636 W HCPCS: Performed by: REGISTERED NURSE

## 2020-10-26 PROCEDURE — 63600175 PHARM REV CODE 636 W HCPCS: Performed by: NURSE PRACTITIONER

## 2020-10-26 PROCEDURE — 36224 IR EMBOLIZATION (CNS) INTRACRANIAL: ICD-10-PCS | Mod: 51,LT,S$GLB, | Performed by: PSYCHIATRY & NEUROLOGY

## 2020-10-26 PROCEDURE — 76377 3D RENDER W/INTRP POSTPROCES: CPT | Mod: TC

## 2020-10-26 PROCEDURE — D9220A PRA ANESTHESIA: Mod: ANES,,, | Performed by: STUDENT IN AN ORGANIZED HEALTH CARE EDUCATION/TRAINING PROGRAM

## 2020-10-26 PROCEDURE — 75894 PR  TRANSCATHETER RX EMBOLIZATN: ICD-10-PCS | Mod: 26,,, | Performed by: PSYCHIATRY & NEUROLOGY

## 2020-10-26 PROCEDURE — 61624 IR EMBOLIZATION (CNS) INTRACRANIAL: ICD-10-PCS | Mod: S$GLB,,, | Performed by: PSYCHIATRY & NEUROLOGY

## 2020-10-26 PROCEDURE — 25500020 PHARM REV CODE 255: Performed by: PSYCHIATRY & NEUROLOGY

## 2020-10-26 PROCEDURE — 75894 X-RAYS TRANSCATH THERAPY: CPT | Mod: 26,,, | Performed by: PSYCHIATRY & NEUROLOGY

## 2020-10-26 PROCEDURE — 75898 FOLLOW-UP ANGIOGRAPHY: CPT | Mod: 26,,, | Performed by: PSYCHIATRY & NEUROLOGY

## 2020-10-26 PROCEDURE — 37000008 HC ANESTHESIA 1ST 15 MINUTES

## 2020-10-26 PROCEDURE — 20000000 HC ICU ROOM

## 2020-10-26 PROCEDURE — C1760 CLOSURE DEV, VASC: HCPCS

## 2020-10-26 PROCEDURE — 25000003 PHARM REV CODE 250: Performed by: NURSE PRACTITIONER

## 2020-10-26 PROCEDURE — 25000003 PHARM REV CODE 250: Performed by: REGISTERED NURSE

## 2020-10-26 PROCEDURE — 37000009 HC ANESTHESIA EA ADD 15 MINS

## 2020-10-26 PROCEDURE — 76377 PR  3D RENDERING W/ IMAGE POSTPROCESS: ICD-10-PCS | Mod: 26,,, | Performed by: PSYCHIATRY & NEUROLOGY

## 2020-10-26 PROCEDURE — 75894 X-RAYS TRANSCATH THERAPY: CPT | Mod: TC

## 2020-10-26 PROCEDURE — 76377 3D RENDER W/INTRP POSTPROCES: CPT | Mod: 26,,, | Performed by: PSYCHIATRY & NEUROLOGY

## 2020-10-26 PROCEDURE — 63600175 PHARM REV CODE 636 W HCPCS: Performed by: STUDENT IN AN ORGANIZED HEALTH CARE EDUCATION/TRAINING PROGRAM

## 2020-10-26 PROCEDURE — 36224 PLACE CATH CAROTD ART: CPT | Mod: 51,LT,S$GLB, | Performed by: PSYCHIATRY & NEUROLOGY

## 2020-10-26 RX ORDER — SODIUM CHLORIDE 9 MG/ML
INJECTION, SOLUTION INTRAVENOUS CONTINUOUS
Status: DISCONTINUED | OUTPATIENT
Start: 2020-10-26 | End: 2020-10-28 | Stop reason: HOSPADM

## 2020-10-26 RX ORDER — HEPARIN SODIUM 1000 [USP'U]/ML
INJECTION, SOLUTION INTRAVENOUS; SUBCUTANEOUS
Status: DISCONTINUED | OUTPATIENT
Start: 2020-10-26 | End: 2020-10-26

## 2020-10-26 RX ORDER — ONDANSETRON 2 MG/ML
4 INJECTION INTRAMUSCULAR; INTRAVENOUS DAILY PRN
Status: COMPLETED | OUTPATIENT
Start: 2020-10-26 | End: 2020-10-26

## 2020-10-26 RX ORDER — ACETAMINOPHEN 325 MG/1
650 TABLET ORAL EVERY 6 HOURS PRN
Status: DISCONTINUED | OUTPATIENT
Start: 2020-10-26 | End: 2020-10-28 | Stop reason: HOSPADM

## 2020-10-26 RX ORDER — VECURONIUM BROMIDE FOR INJECTION 1 MG/ML
INJECTION, POWDER, LYOPHILIZED, FOR SOLUTION INTRAVENOUS
Status: DISCONTINUED | OUTPATIENT
Start: 2020-10-26 | End: 2020-10-26

## 2020-10-26 RX ORDER — HEPARIN SODIUM 1000 [USP'U]/ML
5000 INJECTION, SOLUTION INTRAVENOUS; SUBCUTANEOUS ONCE
Status: DISCONTINUED | OUTPATIENT
Start: 2020-10-26 | End: 2020-10-28 | Stop reason: HOSPADM

## 2020-10-26 RX ORDER — FENTANYL CITRATE 50 UG/ML
INJECTION, SOLUTION INTRAMUSCULAR; INTRAVENOUS
Status: DISCONTINUED | OUTPATIENT
Start: 2020-10-26 | End: 2020-10-26

## 2020-10-26 RX ORDER — LIDOCAINE HYDROCHLORIDE 20 MG/ML
INJECTION INTRAVENOUS
Status: DISCONTINUED | OUTPATIENT
Start: 2020-10-26 | End: 2020-10-26

## 2020-10-26 RX ORDER — DEXAMETHASONE SODIUM PHOSPHATE 4 MG/ML
INJECTION, SOLUTION INTRA-ARTICULAR; INTRALESIONAL; INTRAMUSCULAR; INTRAVENOUS; SOFT TISSUE
Status: DISCONTINUED | OUTPATIENT
Start: 2020-10-26 | End: 2020-10-26

## 2020-10-26 RX ORDER — ONDANSETRON 2 MG/ML
INJECTION INTRAMUSCULAR; INTRAVENOUS
Status: DISCONTINUED | OUTPATIENT
Start: 2020-10-26 | End: 2020-10-26

## 2020-10-26 RX ORDER — ROCURONIUM BROMIDE 10 MG/ML
INJECTION, SOLUTION INTRAVENOUS
Status: DISCONTINUED | OUTPATIENT
Start: 2020-10-26 | End: 2020-10-26

## 2020-10-26 RX ORDER — SODIUM CHLORIDE 0.9 % (FLUSH) 0.9 %
10 SYRINGE (ML) INJECTION
Status: DISCONTINUED | OUTPATIENT
Start: 2020-10-26 | End: 2020-10-28 | Stop reason: HOSPADM

## 2020-10-26 RX ORDER — IODIXANOL 320 MG/ML
250 INJECTION, SOLUTION INTRAVASCULAR
Status: COMPLETED | OUTPATIENT
Start: 2020-10-26 | End: 2020-10-26

## 2020-10-26 RX ORDER — PHENYLEPHRINE HYDROCHLORIDE 10 MG/ML
INJECTION INTRAVENOUS
Status: DISCONTINUED | OUTPATIENT
Start: 2020-10-26 | End: 2020-10-26

## 2020-10-26 RX ORDER — PROPOFOL 10 MG/ML
VIAL (ML) INTRAVENOUS
Status: DISCONTINUED | OUTPATIENT
Start: 2020-10-26 | End: 2020-10-26

## 2020-10-26 RX ORDER — ONDANSETRON 2 MG/ML
4 INJECTION INTRAMUSCULAR; INTRAVENOUS EVERY 6 HOURS PRN
Status: DISCONTINUED | OUTPATIENT
Start: 2020-10-26 | End: 2020-10-28 | Stop reason: HOSPADM

## 2020-10-26 RX ORDER — MIDAZOLAM HYDROCHLORIDE 1 MG/ML
INJECTION, SOLUTION INTRAMUSCULAR; INTRAVENOUS
Status: DISCONTINUED | OUTPATIENT
Start: 2020-10-26 | End: 2020-10-26

## 2020-10-26 RX ORDER — FENTANYL CITRATE 50 UG/ML
25 INJECTION, SOLUTION INTRAMUSCULAR; INTRAVENOUS EVERY 5 MIN PRN
Status: DISCONTINUED | OUTPATIENT
Start: 2020-10-26 | End: 2020-10-27

## 2020-10-26 RX ADMIN — ONDANSETRON 4 MG: 2 INJECTION, SOLUTION INTRAMUSCULAR; INTRAVENOUS at 04:10

## 2020-10-26 RX ADMIN — VECURONIUM BROMIDE FOR INJECTION 2 MG: 1 INJECTION, POWDER, LYOPHILIZED, FOR SOLUTION INTRAVENOUS at 01:10

## 2020-10-26 RX ADMIN — MIDAZOLAM HYDROCHLORIDE 2 MG: 1 INJECTION, SOLUTION INTRAMUSCULAR; INTRAVENOUS at 01:10

## 2020-10-26 RX ADMIN — DEXAMETHASONE SODIUM PHOSPHATE 8 MG: 4 INJECTION, SOLUTION INTRAMUSCULAR; INTRAVENOUS at 02:10

## 2020-10-26 RX ADMIN — IODIXANOL 90 ML: 320 INJECTION, SOLUTION INTRAVASCULAR at 04:10

## 2020-10-26 RX ADMIN — SODIUM CHLORIDE: 0.9 INJECTION, SOLUTION INTRAVENOUS at 05:10

## 2020-10-26 RX ADMIN — LIDOCAINE HYDROCHLORIDE 80 MG: 20 INJECTION, SOLUTION INTRAVENOUS at 01:10

## 2020-10-26 RX ADMIN — SODIUM CHLORIDE: 0.9 INJECTION, SOLUTION INTRAVENOUS at 11:10

## 2020-10-26 RX ADMIN — PHENYLEPHRINE HYDROCHLORIDE 100 MCG: 10 INJECTION INTRAVENOUS at 03:10

## 2020-10-26 RX ADMIN — PROMETHAZINE HYDROCHLORIDE 12.5 MG: 25 INJECTION INTRAMUSCULAR; INTRAVENOUS at 05:10

## 2020-10-26 RX ADMIN — ROCURONIUM BROMIDE 50 MG: 10 INJECTION, SOLUTION INTRAVENOUS at 01:10

## 2020-10-26 RX ADMIN — TICAGRELOR 180 MG: 90 TABLET ORAL at 02:10

## 2020-10-26 RX ADMIN — FENTANYL CITRATE 50 MCG: 50 INJECTION, SOLUTION INTRAMUSCULAR; INTRAVENOUS at 01:10

## 2020-10-26 RX ADMIN — HEPARIN SODIUM 5000 UNITS: 1000 INJECTION, SOLUTION INTRAVENOUS; SUBCUTANEOUS at 03:10

## 2020-10-26 RX ADMIN — ONDANSETRON 4 MG: 2 INJECTION, SOLUTION INTRAMUSCULAR; INTRAVENOUS at 03:10

## 2020-10-26 RX ADMIN — SUGAMMADEX 200 MG: 100 INJECTION, SOLUTION INTRAVENOUS at 03:10

## 2020-10-26 RX ADMIN — PHENYLEPHRINE HYDROCHLORIDE 50 MCG: 10 INJECTION INTRAVENOUS at 03:10

## 2020-10-26 RX ADMIN — SODIUM CHLORIDE: 0.9 INJECTION, SOLUTION INTRAVENOUS at 01:10

## 2020-10-26 RX ADMIN — VECURONIUM BROMIDE FOR INJECTION 2 MG: 1 INJECTION, POWDER, LYOPHILIZED, FOR SOLUTION INTRAVENOUS at 02:10

## 2020-10-26 RX ADMIN — PROPOFOL 150 MG: 10 INJECTION, EMULSION INTRAVENOUS at 01:10

## 2020-10-26 NOTE — SEDATION DOCUMENTATION
Pt tolerated procedure well. Angiogram closure device Mynx deployed successfully to right groin at 1532. Pt may sit up and bend after 1732. Pt to go to PACU for recovery, awaiting a PACU slot.

## 2020-10-26 NOTE — PLAN OF CARE
Pt arrived to IR4 for intracranial embolization of aneurysm. Pt oriented to unit and staff. Plan of care reviewed with patient, patient verbalizes understanding. Comfort measures utilized. Pt safely transferred from stretcher to procedural table. Fall risk reviewed with patient, fall risk interventions maintained. Safety strap applied, positioner pillows utilized to minimize pressure points. Blankets applied. Pt prepped and draped utilizing standard sterile technique. Patient placed on continuous monitoring, as required by sedation policy. Timeouts completed utilizing standard universal time-out, per department and facility policy. RN and CRNA to remain at bedside, continuous monitoring maintained. Pt resting comfortably. Denies pain/discomfort. Will continue to monitor. See CRNA flow sheets for monitoring, medication administration, and updates.

## 2020-10-26 NOTE — PROCEDURES
Radiology Post-Procedure Note    Pre Op Diagnosis: residual aneurysm      Post Op Diagnosis: same    Procedure: Cerebral angiogram and CE embolization    Procedure performed by: Levi Cortez MD    Written Informed Consent Obtained: Yes    Specimen Removed: NO    Estimated Blood Loss: Minimal    Procedure report:     A 6F sheath was placed into the right femoral artery and a 5F Henry catheter was advanced into the aortic arch.  The left internal carotid arteries were subselected and angiography of the brain was performed after injection into each of these vessels.    Preliminary interpretation: successful CE embolization of residual aneurysm.  Please see Imaging report for full details.    A right femoral artery angiogram was performed, the sheath removed and hemostasis achieved using mynx.  No hematoma was present at the time of hemostasis.    The patient tolerated the procedure well.         Levi Cortez MD  Vascular and Interventional Neurology  Director of [facility]  Vascular Neurology Section Head  Departments of Neurology, Neurosurgery and Radiology  [facility] [place] [place]

## 2020-10-26 NOTE — H&P
Radiology History & Physical      SUBJECTIVE:     Chief Complaint: aneurysm recurrance    History of Present Illness:  Adriana Fonseca is a 65 y.o. female who presents for treatment of residual aneurysm.  Past Medical History:   Diagnosis Date    Hyperlipemia     Hypertension     Thyroid disease      Past Surgical History:   Procedure Laterality Date    CEREBRAL ANGIOGRAM Bilateral 4/17/2019    Procedure: ANGIOGRAM-CEREBRAL;  Surgeon: Lamar Surgeon;  Location: [facility];  Service: Anesthesiology;  Laterality: Bilateral;    CEREBRAL ANGIOGRAM N/A 11/13/2019    Procedure: ANGIOGRAM-CEREBRAL;  Surgeon: Radha Diagnostic Provider;  Location: [facility] OR [address] [place] [address];  Service: Radiology;  Laterality: N/A;  Rm 190/Diego    HYSTERECTOMY         Home Meds:   Prior to Admission medications    Medication Sig Start Date End Date Taking? Authorizing Provider   aspirin (ECOTRIN) 325 MG EC tablet Take 1 tablet (325 mg total) by mouth once daily. Start 2 weeks prior to procedure 10/6/20 5/4/21 Yes Levi Cortez MD   atorvastatin (LIPITOR) 10 MG tablet Take 10 mg by mouth once daily. 5/13/19  Yes Historical Provider   clopidogreL (PLAVIX) 75 mg tablet Take 1 tablet (75 mg total) by mouth once daily. Start 2 weeks prior to procedure 10/6/20 4/4/21 Yes Levi Cortez MD   diltiaZEM (CARDIZEM CD) 240 MG 24 hr capsule Take 240 mg by mouth once daily. 8/3/20  Yes Historical Provider   levothyroxine (SYNTHROID) 100 MCG tablet Take 100 mcg by mouth once daily. 5/23/19  Yes Historical Provider   pantoprazole (PROTONIX) 40 MG tablet Take 40 mg by mouth once daily. 5/4/19  Yes Historical Provider   sucralfate (CARAFATE) 100 mg/mL suspension TAKE 10 ML BY MOUTH 3 TIMES DAILY 8/28/20  Yes Historical Provider   niMODipine (NIMOTOP) 30 MG Cap Take 2 capsules (60 mg total) by mouth every 4 (four) hours. for 21 days 4/16/19 5/7/19  Chaim Choudhury MD   sunscreen (NEUTROGENA MOISTURE SPF15) Lotn Use SPF 30--50 sunscreens daily to sun exposed skin.  6/16/17   Historical Provider     Anticoagulants/Antiplatelets: aspirin and Plavix    Allergies: Review of patient's allergies indicates:  No Known Allergies  Sedation History:  no adverse reactions    Review of Systems:   Hematological: no known coagulopathies  Respiratory: no shortness of breath  Cardiovascular: no chest pain  Gastrointestinal: no abdominal pain  Genito-Urinary: no dysuria  Musculoskeletal: negative  Neurological: no TIA or stroke symptoms         OBJECTIVE:     Vital Signs (Most Recent)  Temp: 98 °F (36.7 °C) (10/26/20 1103)  Pulse: 69 (10/26/20 1103)  Resp: 16 (10/26/20 1103)  BP: (!) 150/68 (10/26/20 1103)  SpO2: 100 % (10/26/20 1103)    Physical Exam:  ASA: 1  Mallampati: n/a    General: no acute distress  Mental Status: alert and oriented to person, place and time  HEENT: normocephalic, atraumatic  Chest: unlabored breathing  Heart: regular heart rate  Abdomen: nondistended  Extremity: moves all extremities    Laboratory  Lab Results   Component Value Date    INR 1.0 10/26/2020       Lab Results   Component Value Date    WBC 6.10 10/26/2020    HGB 13.6 10/26/2020    HCT 42.7 10/26/2020    MCV 92 10/26/2020     10/26/2020      Lab Results   Component Value Date     10/26/2020     10/26/2020    K 4.1 10/26/2020     10/26/2020    CO2 24 10/26/2020    BUN 11 10/26/2020    CREATININE 0.9 10/26/2020    CALCIUM 8.9 10/26/2020    MG 2.0 04/28/2019    ALT 20 10/26/2020    AST 20 10/26/2020    ALBUMIN 4.0 10/26/2020    BILITOT 0.6 10/26/2020       ASSESSMENT/PLAN:     Sedation Plan: endotracheal intubation and sedation  Patient will undergo angio w flow diversion.        Levi Cortez MD  Vascular and Interventional Neurology   Director of Comprehensive Stroke Center  Section Head - Vascular Neurology  Departments of Neurology, Neurosurgery and Radiology  [facility] [place] [place]

## 2020-10-26 NOTE — TRANSFER OF CARE
"Anesthesia Transfer of Care Note    Patient: Adriana Fonseca    Procedure(s) Performed: * No procedures listed *    Patient location: PACU    Anesthesia Type: general    Transport from OR: Transported from OR on 6-10 L/min O2 by face mask with adequate spontaneous ventilation    Post pain: adequate analgesia    Post assessment: no apparent anesthetic complications and tolerated procedure well    Post vital signs: stable    Level of consciousness: sedated    Nausea/Vomiting: no nausea/vomiting    Complications: none    Transfer of care protocol was followed      Last vitals:   Visit Vitals  BP (!) 146/75 (BP Location: Left arm, Patient Position: Lying)   Pulse 97   Temp 36.7 °C (98 °F) (Oral)   Resp 16   Ht 5' 6" (1.676 m)   Wt 86.2 kg (190 lb)   LMP  (LMP Unknown)   SpO2 100%   Breastfeeding No   BMI 30.67 kg/m²     "

## 2020-10-27 ENCOUNTER — NURSE TRIAGE (OUTPATIENT)
Dept: ADMINISTRATIVE | Facility: CLINIC | Age: 65
End: 2020-10-27

## 2020-10-27 VITALS
OXYGEN SATURATION: 97 % | WEIGHT: 190 LBS | HEIGHT: 66 IN | RESPIRATION RATE: 15 BRPM | HEART RATE: 78 BPM | SYSTOLIC BLOOD PRESSURE: 151 MMHG | DIASTOLIC BLOOD PRESSURE: 72 MMHG | TEMPERATURE: 98 F | BODY MASS INDEX: 30.53 KG/M2

## 2020-10-27 LAB
ALBUMIN SERPL BCP-MCNC: 3.5 G/DL (ref 3.5–5.2)
ALP SERPL-CCNC: 76 U/L (ref 55–135)
ALT SERPL W/O P-5'-P-CCNC: 18 U/L (ref 10–44)
ANION GAP SERPL CALC-SCNC: 10 MMOL/L (ref 8–16)
AST SERPL-CCNC: 18 U/L (ref 10–40)
BILIRUB SERPL-MCNC: 0.5 MG/DL (ref 0.1–1)
BUN SERPL-MCNC: 12 MG/DL (ref 8–23)
CALCIUM SERPL-MCNC: 8.5 MG/DL (ref 8.7–10.5)
CHLORIDE SERPL-SCNC: 111 MMOL/L (ref 95–110)
CO2 SERPL-SCNC: 19 MMOL/L (ref 23–29)
CREAT SERPL-MCNC: 0.9 MG/DL (ref 0.5–1.4)
ERYTHROCYTE [DISTWIDTH] IN BLOOD BY AUTOMATED COUNT: 13.8 % (ref 11.5–14.5)
EST. GFR  (AFRICAN AMERICAN): >60 ML/MIN/1.73 M^2
EST. GFR  (NON AFRICAN AMERICAN): >60 ML/MIN/1.73 M^2
GLUCOSE SERPL-MCNC: 138 MG/DL (ref 70–110)
HCT VFR BLD AUTO: 40 % (ref 37–48.5)
HGB BLD-MCNC: 13.3 G/DL (ref 12–16)
MAGNESIUM SERPL-MCNC: 2 MG/DL (ref 1.6–2.6)
MCH RBC QN AUTO: 29.4 PG (ref 27–31)
MCHC RBC AUTO-ENTMCNC: 33.3 G/DL (ref 32–36)
MCV RBC AUTO: 88 FL (ref 82–98)
PHOSPHATE SERPL-MCNC: 2.9 MG/DL (ref 2.7–4.5)
PLATELET # BLD AUTO: 307 K/UL (ref 150–350)
PMV BLD AUTO: 9.7 FL (ref 9.2–12.9)
POTASSIUM SERPL-SCNC: 3.9 MMOL/L (ref 3.5–5.1)
PROT SERPL-MCNC: 6.4 G/DL (ref 6–8.4)
RBC # BLD AUTO: 4.53 M/UL (ref 4–5.4)
SODIUM SERPL-SCNC: 140 MMOL/L (ref 136–145)
WBC # BLD AUTO: 14.43 K/UL (ref 3.9–12.7)

## 2020-10-27 PROCEDURE — 25000003 PHARM REV CODE 250: Performed by: PSYCHIATRY & NEUROLOGY

## 2020-10-27 PROCEDURE — 99239 HOSP IP/OBS DSCHRG MGMT >30: CPT | Mod: ,,, | Performed by: PHYSICIAN ASSISTANT

## 2020-10-27 PROCEDURE — 25000003 PHARM REV CODE 250: Performed by: PHYSICIAN ASSISTANT

## 2020-10-27 PROCEDURE — 36415 COLL VENOUS BLD VENIPUNCTURE: CPT

## 2020-10-27 PROCEDURE — 84100 ASSAY OF PHOSPHORUS: CPT

## 2020-10-27 PROCEDURE — 83735 ASSAY OF MAGNESIUM: CPT

## 2020-10-27 PROCEDURE — 85027 COMPLETE CBC AUTOMATED: CPT

## 2020-10-27 PROCEDURE — 25000003 PHARM REV CODE 250: Performed by: NURSE PRACTITIONER

## 2020-10-27 PROCEDURE — 99239 PR HOSPITAL DISCHARGE DAY,>30 MIN: ICD-10-PCS | Mod: ,,, | Performed by: PHYSICIAN ASSISTANT

## 2020-10-27 PROCEDURE — 80053 COMPREHEN METABOLIC PANEL: CPT

## 2020-10-27 RX ORDER — ASPIRIN 325 MG
325 TABLET ORAL DAILY
Status: DISCONTINUED | OUTPATIENT
Start: 2020-10-27 | End: 2020-10-28 | Stop reason: HOSPADM

## 2020-10-27 RX ORDER — ASPIRIN 325 MG
325 TABLET ORAL DAILY
Qty: 30 TABLET | Refills: 11 | Status: SHIPPED | OUTPATIENT
Start: 2020-10-28 | End: 2021-01-25

## 2020-10-27 RX ORDER — ACETAMINOPHEN 325 MG/1
650 TABLET ORAL EVERY 4 HOURS PRN
Status: DISCONTINUED | OUTPATIENT
Start: 2020-10-27 | End: 2020-10-28 | Stop reason: HOSPADM

## 2020-10-27 RX ADMIN — TICAGRELOR 90 MG: 90 TABLET ORAL at 01:10

## 2020-10-27 RX ADMIN — ASPIRIN 325 MG ORAL TABLET 325 MG: 325 PILL ORAL at 02:10

## 2020-10-27 RX ADMIN — ACETAMINOPHEN 650 MG: 325 TABLET ORAL at 12:10

## 2020-10-27 RX ADMIN — TICAGRELOR 90 MG: 90 TABLET ORAL at 09:10

## 2020-10-27 NOTE — DISCHARGE SUMMARY
[facility]  Neurocritical Care  Discharge Summary    Admit Date: (Not on file)    Service Date: 10/27/2020    Discharge Date:     Length of Stay: 0    Final Active Diagnoses:    Diagnosis Date Noted POA    PRINCIPAL PROBLEM:  Cerebral aneurysm, nonruptured [I67.1] 10/26/2020 Yes    Hyperlipidemia, mixed [E78.2] 04/20/2019 Yes    S/P coil embolization of cerebral aneurysm [Z98.890] 04/17/2019 Not Applicable    Essential hypertension [I10] 04/16/2019 Yes    Thyroid disease [E07.9] 04/16/2019 Yes      Problems Resolved During this Admission:      History of Present Illness: Adriana Fonseca is a 65 y.o. female with a history of HTN, HLD, Hypothyroidism and left ICA superior hypophyseal segment aneurysm who presents for treatment of residual aneurysm.  10/26 She is s/p elective embolization with flow diverter.    Hospital Course by Event: 10/27/2020 discharge home today      Hospital Course by Problem:   * Cerebral aneurysm, nonruptured  Elective embolization of residual L ICA aneurysm with flow diverter  Continue ticagrelor 90 mg bid  Continue  mg daily  Discussed with Dr. Cortez and Dr. Villalpando    Hyperlipidemia, mixed  Continue home statin     S/P coil embolization of cerebral aneurysm  R homonymous hemianopsia   MRI brain non revealing  Ophthalmology follow up after discharge    Thyroid disease  Resume home levothyroxine    Essential hypertension  Resume home meds      Significant Results:  Imaging:  MRI brain     Cardiology:  NA    Microbiology:  NA    Laboratory:  Lab Results   Component Value Date    HGBA1C 5.5 04/16/2019    CHOL 178 04/16/2019    HDL 62 04/16/2019    LDLCALC 101.8 04/16/2019    TRIG 71 04/16/2019    TSH 0.864 04/16/2019       Pending Results: NA    Consultations:  None  Neuro IR    Procedures:   * No procedures listed * by * No surgeons listed *.  Cerebral angio with L ICA coil and flow diverter     Medications:    Adriana Fonseca   Home Medication Instructions  [facility]:[id number]    Printed on:10/27/20 0906   Medication Information                      aspirin 325 MG tablet  Take 1 tablet (325 mg total) by mouth once daily.             atorvastatin (LIPITOR) 10 MG tablet  Take 10 mg by mouth once daily.             diltiaZEM (CARDIZEM CD) 240 MG 24 hr capsule  Take 240 mg by mouth once daily.             levothyroxine (SYNTHROID) 100 MCG tablet  Take 100 mcg by mouth once daily.             pantoprazole (PROTONIX) 40 MG tablet  Take 40 mg by mouth once daily.             sunscreen (NEUTROGENA MOISTURE SPF15) Lotn  Use SPF 30--50 sunscreens daily to sun exposed skin.             ticagrelor (BRILINTA) 90 mg tablet  Take 1 tablet (90 mg total) by mouth 2 (two) times a day.               Diet: Regular    Activity: As tolerated.     Disposition: Discharged to home in stable condition.    Follow Up Plan:  Neuro IR  Neurosurgery  Ophthalmology   PCP    This discharge took greater than 30 minutes to complete.    Freda Vallejo PA-C  Neurocritical Care  [facility]

## 2020-10-27 NOTE — PROGRESS NOTES
Neuro critical care team at bedside. Aware of patient stating vision is blurred and  concerned about patient peripheral vision.  states patient vision is blurred at baseline without glasses.

## 2020-10-27 NOTE — DISCHARGE SUMMARY
This note has been moved to another encounter. If you have any questions, please contact HIM Chart Correction at [phone].

## 2020-10-27 NOTE — PROGRESS NOTES
Patient passes MAURISIO swallow study and complains that she has problems seeing out of her right eye. [facility] pending NP to come to bedside. Dane

## 2020-10-27 NOTE — HOSPITAL COURSE
10/26 admit to NCC s/p embolization with flow diverter  Of LICA aneurysm. SBP goal 100-160. To be started on brillinta this pm and ASA 325mg tomorrow. Possible discharge in am

## 2020-10-27 NOTE — ASSESSMENT & PLAN NOTE
S/p elective  Embolization of LICA aneurysm with flow diverter.  Admit to NCC  SBP goal 100-160  Start Brillinta this evening  Start ASA in am  IVF NS 75cc/h  Prn zofran, phenergan  For nausea  Prn antihypertensive for BP control  Labs in am pending CBC,CMP,MG,PO4

## 2020-10-27 NOTE — PROGRESS NOTES
Patient remains in PACU pending place into [facility]. No changes overnight, VSS, updated patient's  and patient spoke to  via phone. Dane

## 2020-10-27 NOTE — H&P
[facility]  Neurocritical Care  Progress Note    Admit Date: (Not on file)  Service Date: 10/26/2020  Length of Stay: 0    Subjective:     Chief Complaint: <principal problem not specified>    History of Present Illness:   Adriana Fonseca is a 65 y.o. female with a history of HTN, HLD, Hypothyroidism and left ICA superior hypophyseal segment aneurysm.   who presents for treatment of residual aneurysm.  10/26 She is s/p  Elective embolization with flow diverter.    Hospital Course: 10/26 admit to [facility] s/p embolization with flow diverter  Of LICA aneurysm. SBP goal 100-160. To be started on brillinta this pm and ASA 325mg tomorrow. Possible discharge in am    Past Medical History:   Diagnosis Date    Hyperlipemia     Hypertension     Thyroid disease      Past Surgical History:   Procedure Laterality Date    CEREBRAL ANGIOGRAM Bilateral 4/17/2019    Procedure: ANGIOGRAM-CEREBRAL;  Surgeon: Lamar Surgeon;  Location: [facility];  Service: Anesthesiology;  Laterality: Bilateral;    CEREBRAL ANGIOGRAM N/A 11/13/2019    Procedure: ANGIOGRAM-CEREBRAL;  Surgeon: Radha Diagnostic Provider;  Location: [facility] OR [address] [place] [address];  Service: Radiology;  Laterality: N/A;  Rm 190/Diego    HYSTERECTOMY         Current Facility-Administered Medications on File Prior to Encounter   Medication Dose Route Frequency Provider Last Rate Last Dose    0.9%  NaCl infusion   Intravenous Continuous Gila Juares NP 75 mL/hr at 10/26/20 1721      fentaNYL injection 25 mcg  25 mcg Intravenous Q5 Min PRN Paolo Lemon MD        heparin (porcine) injection 5,000 Units  5,000 Units Intravenous Once Gila Juares NP        [COMPLETED] iodixanoL (VISIPAQUE 320) injection 250 mL  250 mL Intravenous ONCE PRN Levi Cortez MD   90 mL at 10/26/20 1613    [COMPLETED] ondansetron injection 4 mg  4 mg Intravenous Daily PRN Paolo Lemon MD   4 mg at 10/26/20 1633    promethazine (PHENERGAN) 12.5 mg in dextrose 5 % 50 mL IVPB   12.5 mg Intravenous Q6H PRN Dagmar Cordova  mL/hr at 10/26/20 1739 12.5 mg at 10/26/20 1739    sodium chloride 0.9% flush 10 mL  10 mL Intravenous PRN Paolo Lemon MD        sodium chloride 0.9% flush 10 mL  10 mL Intravenous PRN Levi Cortez MD        [COMPLETED] ticagrelor tablet 180 mg  180 mg Oral Once Levi Cortez MD   180 mg at 10/26/20 1425    ticagrelor tablet 90 mg  90 mg Oral BID Levi Cortez MD        [DISCONTINUED] dexamethasone injection   Intravenous PRN Jose Campos CRNA   8 mg at 10/26/20 1400    [DISCONTINUED] electrolyte-S (ISOLYTE)    Continuous PRN Jose Campos CRNA   Stopped at 10/26/20 1605    [DISCONTINUED] electrolyte-S (ISOLYTE)    Continuous PRN Jose Campos CRNA   Stopped at 10/26/20 1605    [DISCONTINUED] fentaNYL injection    PRN Jose Campos CRNA   50 mcg at 10/26/20 1320    [DISCONTINUED] glycopyrrolate (PF) injection   Intravenous PRN Jose Campos CRNA   200 mcg at 10/26/20 1506    [DISCONTINUED] heparin (porcine) injection    PRN Jose Campos CRNA   5,000 Units at 10/26/20 1507    [DISCONTINUED] lidocaine (cardiac) injection    PRN Jose Campos CRNA   80 mg at 10/26/20 1322    [DISCONTINUED] midazolam injection   Intravenous PRN Jose Campos CRNA   2 mg at 10/26/20 1308    [DISCONTINUED] ondansetron injection   Intravenous PRN Jose Campos CRNA   4 mg at 10/26/20 1534    [DISCONTINUED] phenylephrine injection    PRN Jose Campos CRNA   50 mcg at 10/26/20 1518    [DISCONTINUED] propofol (DIPRIVAN) 10 mg/mL infusion    PRN Jose Campos CRNA   150 mg at 10/26/20 1322    [DISCONTINUED] rocuronium injection    PRN Jose Campos CRNA   50 mg at 10/26/20 1322    [DISCONTINUED] sugammadex (BRIDION) 100 mg/mL injection   Intravenous PRN Jose Campos CRNA   200 mg at 10/26/20 1538    [DISCONTINUED] vecuronium injection    PRN Jose Campos CRNA   2 mg at 10/26/20 1426      Current Outpatient Medications on File Prior to Encounter   Medication Sig Dispense Refill    aspirin (ECOTRIN) 325 MG EC tablet Take 1 tablet (325 mg total) by mouth once daily. Start 2 weeks prior to procedure 30 tablet 6    atorvastatin (LIPITOR) 10 MG tablet Take 10 mg by mouth once daily.  2    clopidogreL (PLAVIX) 75 mg tablet Take 1 tablet (75 mg total) by mouth once daily. Start 2 weeks prior to procedure 30 tablet 5    diltiaZEM (CARDIZEM CD) 240 MG 24 hr capsule Take 240 mg by mouth once daily.      levothyroxine (SYNTHROID) 100 MCG tablet Take 100 mcg by mouth once daily.  1    niMODipine (NIMOTOP) 30 MG Cap Take 2 capsules (60 mg total) by mouth every 4 (four) hours. for 21 days 252 capsule 0    pantoprazole (PROTONIX) 40 MG tablet Take 40 mg by mouth once daily.  3    sucralfate (CARAFATE) 100 mg/mL suspension TAKE 10 ML BY MOUTH 3 TIMES DAILY      sunscreen (NEUTROGENA MOISTURE SPF15) Lotn Use SPF 30--50 sunscreens daily to sun exposed skin.       Allergies: Patient has no known allergies.    Family History   Problem Relation Age of Onset    Aneurysm Father        Social History     Tobacco Use    Smoking status: Never Smoker    Smokeless tobacco: Never Used   Substance Use Topics    Alcohol use: Not Currently    Drug use: Never      Review of Systems:   Constitutional: Denies fevers, weight loss, chills, or weakness.  Eyes: Denies changes in vision.  ENT: Denies dysphagia, nasal discharge, ear pain or discharge.  Cardiovascular: Denies chest pain, palpitations, orthopnea, or claudication.  Respiratory: Denies shortness of breath, cough, hemoptysis, or wheezing.  GI: + nausea/vomitting, hematochezia, melena, abd pain, or changes in appetite.  : Denies dysuria, incontinence, or hematuria.  Musculoskeletal: Denies joint pain or myalgias.  Skin/breast: Denies rashes, lumps, lesions, or discharge.  Neurologic: Denies headache, dizziness, vertigo, or paresthesias.  Psychiatric: Denies  changes in mood or hallucinations.  Endocrine: Denies polyuria, polydipsia, heat/cold intolerance.  Hematologic/Lymph: Denies lymphadenopathy, easy bruising or easy bleeding.  Allergic/Immunologic: Denies rash, rhinitis. .     Vitals:        Temp  Min: 97 °F (36.1 °C)  Max: 98 °F (36.7 °C)  Pulse  Min: 69  Max: 97  BP  Min: 131/62  Max: 150/75  MAP (mmHg)  Min: 88  Max: 106  Resp  Min: 12  Max: 21  SpO2  Min: 93 %  Max: 100 %    No intake/output data recorded.         Examination:   Constitutional: Well-nourished and -developed. No apparent distress.   Eyes: Conjunctiva clear, anicteric. Lids no lesions.  Head/Ears/Nose/Mouth/Throat/Neck: Moist mucous membranes. External ears, nose atraumatic.   Cardiovascular: Regular rhythm. No murmurs. No leg edema.  Respiratory: Comfortable respirations. Clear to auscultation.  Gastrointestinal: No hernia. Soft, nondistended, nontender. + bowel sounds.  Skin: R groin site soft dressing c/d/I  Neurologic:   -[id number]  -drowys but Alert. Oriented to person, place, and time. Speech fluent. Follows commands.   -Cranial nerves II-XII intact, PERRL 3+  -Strength 5/5 and symmetric in arms, legs. Tone normal.   -Sensation bilat intact to touch in arms, legs.    Today I independently reviewed pertinent medications, lines/drains/airways, imaging, laboratory results, microbiology results, notably:   Assessment/Plan:     Cardiac/Vascular  Nonruptured aneurysm  S/p elective  Embolization of LICA aneurysm with flow diverter.  Admit to [facility]  SBP goal 100-160  Start Brillinta this evening  Start ASA in am  IVF NS 75cc/h  Prn zofran, phenergan  For nausea  Prn antihypertensive for BP control  Labs in am pending CBC,CMP,MG,PO4          The patient is being Prophylaxed for:  Venous Thromboembolism with: Mechanical  Stress Ulcer with: Not Applicable   Ventilator Pneumonia with: not applicable    Activity Orders          None        Full Code     I have spent 35 min with this patient, with over 50% of  this time spent coordinating care and speaking with the family    Dagmar Cordova NP  Neurocritical Care  [facility]

## 2020-10-27 NOTE — PROGRESS NOTES
NSCC at patient bedside for vision changes, orders for Stat Ct. No other changes, VSS, pending transfer to CT. Wctm

## 2020-10-27 NOTE — PROGRESS NOTES
Discharge instructions given. Pt and spouse acknowledge understanding of what/ when to start taking new meds and what meds to stop taking. Pt verbalized readiness for discharge. PIV's removed, VSS. Pt discharged into the care of her spouse with personal belongings, cell phone, and discharge paperwork.

## 2020-10-27 NOTE — ASSESSMENT & PLAN NOTE
Elective embolization of residual L ICA aneurysm with flow diverter  Continue ticagrelor 90 mg bid  Continue  mg daily  Discussed with Dr. Cortez and Dr. Villalpando

## 2020-10-27 NOTE — HPI
Adriana Fonseca is a 65 y.o. female with a history of HTN, HLD, Hypothyroidism and left ICA superior hypophyseal segment aneurysm.   who presents for treatment of residual aneurysm.  10/26 She is s/p  Elective embolization with flow diverter.

## 2020-10-27 NOTE — PROGRESS NOTES
Patient to CT and back to PACU, VSS, NAD, safety measures in place, NSCC in CT reviewing CT exam. Wctm

## 2020-10-27 NOTE — ANESTHESIA POSTPROCEDURE EVALUATION
Anesthesia Post Evaluation    Patient: Adriana Fonseca    Procedure(s) Performed: * No procedures listed *    Final Anesthesia Type: general    Patient location during evaluation: PACU  Patient participation: Yes- Able to Participate  Level of consciousness: awake and alert  Post-procedure vital signs: reviewed and stable  Pain management: adequate  Airway patency: patent  BETO mitigation strategies: Extubation while patient is awake, Verification of full reversal of neuromuscular block and Extubation and recovery carried out in lateral, semiupright, or other nonsupine position  PONV status at discharge: No PONV  Anesthetic complications: no      Cardiovascular status: stable  Respiratory status: unassisted, spontaneous ventilation and face mask  Hydration status: euvolemic  Follow-up not needed.          Vitals Value Taken Time   /65 10/26/20 1901   Temp 36.1 °C (97 °F) 10/26/20 1800   Pulse 84 10/26/20 1957   Resp 16 10/26/20 1957   SpO2 96 % 10/26/20 1957   Vitals shown include unvalidated device data.      No case tracking events are documented in the log.      Pain/Carole Score: No data recorded

## 2020-10-28 NOTE — TELEPHONE ENCOUNTER
Pharmacist from [facility], Oscar, is calling to get verbal order to fill Brilenta. Patient was discharged from hospital today. Freda Vallejo PA-C prescribed Brilenta 90 mg tablet, take one by mouth twice daily for one year, dispense 60 tablets with 11 refills. This prescription was sent to a different [facility]. Oscar attempted to pull prescription over to his [facility] at [address] [place] and prescription was deleted. Able to see prescription in EMR. Gave verbal to Pharmacist.    Reason for Disposition   Pharmacy calling with prescription question and triager answers question    Protocols used: MEDICATION QUESTION CALL-A-

## 2020-10-29 ENCOUNTER — PATIENT MESSAGE (OUTPATIENT)
Dept: NEUROSURGERY | Facility: CLINIC | Age: 65
End: 2020-10-29

## 2020-10-29 ENCOUNTER — TELEPHONE (OUTPATIENT)
Dept: NEUROSURGERY | Facility: CLINIC | Age: 65
End: 2020-10-29

## 2020-10-29 DIAGNOSIS — Z98.890 S/P COIL EMBOLIZATION OF CEREBRAL ANEURYSM: ICD-10-CM

## 2020-10-29 DIAGNOSIS — I60.8 SUBARACHNOID HEMORRHAGE DUE TO CEREBRAL ANEURYSM: Primary | ICD-10-CM

## 2020-10-29 RX ORDER — DEXAMETHASONE 4 MG/1
4 TABLET ORAL 2 TIMES DAILY
Qty: 20 TABLET | Refills: 0 | Status: SHIPPED | OUTPATIENT
Start: 2020-10-29 | End: 2020-11-08

## 2020-11-02 ENCOUNTER — OFFICE VISIT (OUTPATIENT)
Dept: NEUROSURGERY | Facility: CLINIC | Age: 65
End: 2020-11-02
Payer: MEDICARE

## 2020-11-02 VITALS
HEIGHT: 66 IN | TEMPERATURE: 97 F | BODY MASS INDEX: 30.53 KG/M2 | HEART RATE: 52 BPM | DIASTOLIC BLOOD PRESSURE: 81 MMHG | SYSTOLIC BLOOD PRESSURE: 166 MMHG | WEIGHT: 190 LBS

## 2020-11-02 DIAGNOSIS — H53.461 RIGHT HOMONYMOUS HEMIANOPSIA: ICD-10-CM

## 2020-11-02 DIAGNOSIS — I67.1 CEREBRAL ANEURYSM: Primary | ICD-10-CM

## 2020-11-02 PROCEDURE — 99999 PR PBB SHADOW E&M-EST. PATIENT-LVL IV: CPT | Mod: PBBFAC,,, | Performed by: NEUROLOGICAL SURGERY

## 2020-11-02 PROCEDURE — 99214 PR OFFICE/OUTPT VISIT, EST, LEVL IV, 30-39 MIN: ICD-10-PCS | Mod: S$PBB,,, | Performed by: NEUROLOGICAL SURGERY

## 2020-11-02 PROCEDURE — 99214 OFFICE O/P EST MOD 30 MIN: CPT | Mod: PBBFAC | Performed by: NEUROLOGICAL SURGERY

## 2020-11-02 PROCEDURE — 99214 OFFICE O/P EST MOD 30 MIN: CPT | Mod: S$PBB,,, | Performed by: NEUROLOGICAL SURGERY

## 2020-11-02 PROCEDURE — 99999 PR PBB SHADOW E&M-EST. PATIENT-LVL IV: ICD-10-PCS | Mod: PBBFAC,,, | Performed by: NEUROLOGICAL SURGERY

## 2020-11-02 RX ORDER — BUTALBITAL, ACETAMINOPHEN AND CAFFEINE 50; 325; 40 MG/1; MG/1; MG/1
1 TABLET ORAL EVERY 4 HOURS PRN
Qty: 60 TABLET | Refills: 1 | Status: SHIPPED | OUTPATIENT
Start: 2020-11-02 | End: 2020-12-02

## 2020-11-02 NOTE — PROGRESS NOTES
Adriana Fonseca was seen in neurosurgical follow-up at the office this morning.  She underwent endovascular treatment of the residual left internal carotid-superior hypophyseal artery aneurysm on 10/26/2020 with Dr. Cortez.  Angiographically this seemed to go well but the patient awakened complaining of right-sided visual loss.  MRI was performed showing no evidence for ischemic infarction.  She was discharged the following day.  She was seen on 10/28/2020 by Dr. Cobian her optometrist in [place] who noted a right homonymous hemianopsia with visual field testing.  This has persisted.  She has continued with headaches which are more of a chronic problem.  She has had no new facial weakness or numbness, no weakness or numbness of the extremities, difficulty with gait or balance.    On brief examination today she is alert and oriented although concerned as would be expected about her visual loss.  Eyes show full extraocular movements and the pupils are equal.  She has a fairly dense right homonymous hemianopsia to finger motion.  Facial sensation and movement are good she shows no focal weakness in the extremities.    MRI of the brain performed at [facility] on 10/27/2020 was reviewed.  There is no evidence for cerebral infarction on diffusion-weighted images or FLAIR.  The coil mass of the left internal carotid artery aneurysm is noted.  This does somewhat elevate the left optic tract but not severely.  There is no increased signal intensity in the left optic tracts on coronal T2 weighted scans.  Cerebral angiography on 10/26/2020 shows good deployment of the Matt flow diversion device with normal flow through the internal carotid artery and reduction of residual aneurysm.    The visual loss may represent temporary increased pressure or some ischemia of the left optic tract.  She is taking aspirin and Brilinta following the flow divertor employment.  I will have her return in about 4 weeks for follow-up  evaluation and we should plan formal visual fields in about 3 months.  I will have MRI of the orbits done for her next visit.

## 2020-11-11 ENCOUNTER — PATIENT MESSAGE (OUTPATIENT)
Dept: NEUROSURGERY | Facility: CLINIC | Age: 65
End: 2020-11-11

## 2020-11-12 ENCOUNTER — PATIENT MESSAGE (OUTPATIENT)
Dept: NEUROSURGERY | Facility: CLINIC | Age: 65
End: 2020-11-12

## 2020-11-12 NOTE — TELEPHONE ENCOUNTER
YUE MENDEZ AND DISCUSSED HIVES, CONTACTED DR MILTON AND DISCUSSED HIVES. HE RECOMMENDS BENADRYL, AND LOWERING THE ASA 325mg TO 81mg. HERE ON Friday AND Monday. SHE IS TO CALL ME IF THIS DOES NOT IMPROVE.

## 2020-11-16 ENCOUNTER — PATIENT MESSAGE (OUTPATIENT)
Dept: NEUROSURGERY | Facility: CLINIC | Age: 65
End: 2020-11-16

## 2020-11-30 ENCOUNTER — OFFICE VISIT (OUTPATIENT)
Dept: NEUROSURGERY | Facility: CLINIC | Age: 65
End: 2020-11-30
Payer: MEDICARE

## 2020-11-30 ENCOUNTER — PATIENT MESSAGE (OUTPATIENT)
Dept: NEUROSURGERY | Facility: CLINIC | Age: 65
End: 2020-11-30

## 2020-11-30 ENCOUNTER — HOSPITAL ENCOUNTER (OUTPATIENT)
Dept: RADIOLOGY | Facility: HOSPITAL | Age: 65
Discharge: HOME | End: 2020-11-30
Attending: NEUROLOGICAL SURGERY
Payer: MEDICARE

## 2020-11-30 VITALS
HEART RATE: 87 BPM | SYSTOLIC BLOOD PRESSURE: 129 MMHG | WEIGHT: 190 LBS | BODY MASS INDEX: 30.53 KG/M2 | DIASTOLIC BLOOD PRESSURE: 86 MMHG | TEMPERATURE: 98 F | HEIGHT: 66 IN

## 2020-11-30 DIAGNOSIS — I67.1 CEREBRAL ANEURYSM: ICD-10-CM

## 2020-11-30 DIAGNOSIS — H53.461 RIGHT HOMONYMOUS HEMIANOPSIA: Primary | ICD-10-CM

## 2020-11-30 DIAGNOSIS — H53.461 RIGHT HOMONYMOUS HEMIANOPSIA: ICD-10-CM

## 2020-11-30 PROCEDURE — 99213 OFFICE O/P EST LOW 20 MIN: CPT | Mod: S$PBB,,, | Performed by: NEUROLOGICAL SURGERY

## 2020-11-30 PROCEDURE — A9585 GADOBUTROL INJECTION: HCPCS | Performed by: NEUROLOGICAL SURGERY

## 2020-11-30 PROCEDURE — 70553 MRI BRAIN STEM W/O & W/DYE: CPT

## 2020-11-30 PROCEDURE — 70553 MRI BRAIN STEM W/O & W/DYE: CPT | Mod: 26,,, | Performed by: RADIOLOGY

## 2020-11-30 PROCEDURE — 99213 PR OFFICE/OUTPT VISIT, EST, LEVL III, 20-29 MIN: ICD-10-PCS | Mod: S$PBB,,, | Performed by: NEUROLOGICAL SURGERY

## 2020-11-30 PROCEDURE — 25500020 PHARM REV CODE 255: Performed by: NEUROLOGICAL SURGERY

## 2020-11-30 PROCEDURE — 99999 PR PBB SHADOW E&M-EST. PATIENT-LVL IV: CPT | Mod: PBBFAC,,, | Performed by: NEUROLOGICAL SURGERY

## 2020-11-30 PROCEDURE — 70553 MRI HEAD-ORBITS W/WO CONTRAST (XPD): ICD-10-PCS | Mod: 26,,, | Performed by: RADIOLOGY

## 2020-11-30 PROCEDURE — 70543 MRI ORBT/FAC/NCK W/O &W/DYE: CPT | Mod: 26,,, | Performed by: RADIOLOGY

## 2020-11-30 PROCEDURE — 70543 MRI HEAD-ORBITS W/WO CONTRAST (XPD): ICD-10-PCS | Mod: 26,,, | Performed by: RADIOLOGY

## 2020-11-30 PROCEDURE — 99214 OFFICE O/P EST MOD 30 MIN: CPT | Mod: PBBFAC,25 | Performed by: NEUROLOGICAL SURGERY

## 2020-11-30 PROCEDURE — 70543 MRI ORBT/FAC/NCK W/O &W/DYE: CPT | Mod: TC

## 2020-11-30 PROCEDURE — 99999 PR PBB SHADOW E&M-EST. PATIENT-LVL IV: ICD-10-PCS | Mod: PBBFAC,,, | Performed by: NEUROLOGICAL SURGERY

## 2020-11-30 RX ORDER — CETIRIZINE HYDROCHLORIDE 10 MG/1
10 TABLET ORAL
COMMUNITY

## 2020-11-30 RX ORDER — ASPIRIN 81 MG/1
81 TABLET ORAL DAILY
COMMUNITY

## 2020-11-30 RX ORDER — DEXAMETHASONE 2 MG/1
4 TABLET ORAL DAILY
COMMUNITY
End: 2021-01-25

## 2020-11-30 RX ORDER — GADOBUTROL 604.72 MG/ML
9 INJECTION INTRAVENOUS
Status: COMPLETED | OUTPATIENT
Start: 2020-11-30 | End: 2020-11-30

## 2020-11-30 RX ADMIN — GADOBUTROL 9 ML: 604.72 INJECTION INTRAVENOUS at 01:11

## 2020-11-30 NOTE — PROGRESS NOTES
Adriana Paz returned in neurosurgical followup to the office this afternoon.  She continues with right homonymous hemianopsia.  This is not midnight black but she is unable to see objects in the right visual field.  A few days ago she began to complain of pain in the right eye.  He has otherwise remained neurologically stable.    Brief examination today she is alert and cooperative.  There is injection of the conjunctiva of the right eye and not of the left.  This seems consistent with a conjunctivitis.  She was able to see some finger motion at about 45° in the right visual field.  Extraocular movements remain intact and pupils are equal.    MRI of the brain and orbits was done at [facility] today.  Again is seen the coiled left internal carotid superior hypophyseal artery aneurysm which mildly elevates the left optic tract.  There is no signal intensity change in the optic tract.  However there is a new FLAIR signal in the left occipital lobe right at the occipital pole that then extends along the optic radiation to the side of the ventricle.  This lesion could create a right homonymous hemianopsia.    She does have some slight preserved vision in the right visual field and hopefully she will show continued improvement.  I will have her seen in neuro ophthalmology consultation both to evaluate the visual deficit and perhaps recommend some prism glasses to help her improve the width of her visual fields.  The conjunctivitis of the right eye seems probably viral and should be self-limited.  I will have her return in about 2 months in follow-up.

## 2020-12-07 ENCOUNTER — OFFICE VISIT (OUTPATIENT)
Dept: OPHTHALMOLOGY | Facility: CLINIC | Age: 65
End: 2020-12-07
Payer: MEDICARE

## 2020-12-07 ENCOUNTER — CLINICAL SUPPORT (OUTPATIENT)
Dept: OPHTHALMOLOGY | Facility: CLINIC | Age: 65
End: 2020-12-07
Payer: MEDICARE

## 2020-12-07 DIAGNOSIS — I60.8 SUBARACHNOID HEMORRHAGE DUE TO RUPTURED ANEURYSM: ICD-10-CM

## 2020-12-07 DIAGNOSIS — H53.461 RIGHT HOMONYMOUS HEMIANOPSIA DUE TO RECENT CEREBRAL INFARCTION: Primary | ICD-10-CM

## 2020-12-07 DIAGNOSIS — H53.461 RIGHT HOMONYMOUS HEMIANOPSIA: ICD-10-CM

## 2020-12-07 DIAGNOSIS — I69.398 RIGHT HOMONYMOUS HEMIANOPSIA DUE TO RECENT CEREBRAL INFARCTION: Primary | ICD-10-CM

## 2020-12-07 PROCEDURE — 99999 PR PBB SHADOW E&M-EST. PATIENT-LVL III: ICD-10-PCS | Mod: PBBFAC,,, | Performed by: OPHTHALMOLOGY

## 2020-12-07 PROCEDURE — 92083 HUMPHREY VISUAL FIELD - OU - BOTH EYES: ICD-10-PCS | Mod: 26,S$PBB,, | Performed by: OPHTHALMOLOGY

## 2020-12-07 PROCEDURE — 92004 PR EYE EXAM, NEW PATIENT,COMPREHESV: ICD-10-PCS | Mod: S$PBB,,, | Performed by: OPHTHALMOLOGY

## 2020-12-07 PROCEDURE — 92083 EXTENDED VISUAL FIELD XM: CPT | Mod: PBBFAC | Performed by: OPHTHALMOLOGY

## 2020-12-07 PROCEDURE — 99999 PR PBB SHADOW E&M-EST. PATIENT-LVL III: CPT | Mod: PBBFAC,,, | Performed by: OPHTHALMOLOGY

## 2020-12-07 PROCEDURE — 92004 COMPRE OPH EXAM NEW PT 1/>: CPT | Mod: S$PBB,,, | Performed by: OPHTHALMOLOGY

## 2020-12-07 PROCEDURE — 99213 OFFICE O/P EST LOW 20 MIN: CPT | Mod: PBBFAC | Performed by: OPHTHALMOLOGY

## 2020-12-07 NOTE — LETTER
[facility] [address]st [address]  [address]  [facility] [address]  Phone: [phone]  Fax: [phone]   December 7, 2020    Delbert Li MD  [address]  [facility] [address]    Patient: Adriana Fonseca   MR Number: [id number]   YOB: 1955   Date of Visit: 12/7/2020       Dear Dr. Li:    Thank you for referring Adriana Fonseca to me for evaluation. Here is my assessment and plan of care:    Assessment/Plan     For exam results, see Encounter Report.    Right homonymous hemianopsia due to recent cerebral infarction  -     Wright Visual Field - OU - Extended - Both Eyes    Right homonymous hemianopsia  -     Ambulatory referral/consult to Ophthalmology  -     Wright Visual Field - OU - Extended - Both Eyes    Subarachnoid hemorrhage due to ruptured aneurysm      I discussed the services offered by the [facility] and I will refer Ms. Fonseca to them. I emphasized the need for training before considering trying to drive. I will repeat her exam and visual field testing in three months.          Below you will find my full exam findings. If you have questions, please do not hesitate to call me. I look forward to following Ms. Adriana Fonseca along with you.    Sincerely,          Real Gleason MD       CC  No Recipients             Base Eye Exam     Visual Acuity (Snellen - Linear)       Right Left    Dist sc 20/40 -1 20/40    Dist ph sc NI NI    Correction: Glasses          Tonometry (Applanation, 2:16 PM)       Right Left    Pressure 20 20          Pupils       Dark Light Shape React APD    Right 5 3 Round Brisk None    Left 5 3 Round Brisk None          Visual Fields    See HVF report           Neuro/Psych     Oriented x3: Yes    Mood/Affect: Normal          Dilation     Both eyes: 1% Mydriacyl, 2.5% Phenylephrine @ 2:17 PM            Slit Lamp and Fundus Exam     Slit Lamp Exam       Right Left    Lids/Lashes Normal Normal    Conjunctiva/Sclera White and quiet White  and quiet    Cornea Clear Clear    Anterior Chamber Deep and quiet Deep and quiet    Iris Round and reactive Round and reactive    Lens 2+ Nuclear sclerosis, 1+ Posterior subcapsular cataract 2+ Nuclear sclerosis, 1+ Posterior subcapsular cataract    Vitreous Normal Normal          Fundus Exam       Right Left    Disc Normal Normal    C/D Ratio 0.3 0.3    Macula Normal Normal    Vessels Normal Normal    Periphery Normal Normal

## 2020-12-07 NOTE — PROGRESS NOTES
HPI     Concerns About Ocular Health      Additional comments: Right homonymous hemianopsia              Comments     Referred by Dr. Li  Hx of Cerebral aneurysm 2019.  Stent 10/2020.  Patient here w/daughter Tiffanie which states patient complains of OD eye   pain and headaches w/nausea.  5-4 on pain scale.  Would she be able to still have Cataract surg?  Review HVF    I have personally interviewed the patient, reviewed the history and   examined the patient and agree with the technician's exam.              Last edited by Real Gleason MD on 12/7/2020  2:11 PM. (History)            Assessment /Plan     For exam results, see Encounter Report.    Right homonymous hemianopsia due to recent cerebral infarction  -     Wright Visual Field - OU - Extended - Both Eyes    Right homonymous hemianopsia  -     Ambulatory referral/consult to Ophthalmology  -     Wright Visual Field - OU - Extended - Both Eyes    Subarachnoid hemorrhage due to ruptured aneurysm      I discussed the services offered by the [facility] and I will refer Ms. Fonseca to them. I emphasized the need for training before considering trying to drive. I will repeat her exam and visual field testing in three months.

## 2020-12-10 ENCOUNTER — TELEPHONE (OUTPATIENT)
Dept: OPHTHALMOLOGY | Facility: CLINIC | Age: 65
End: 2020-12-10

## 2020-12-10 ENCOUNTER — PATIENT MESSAGE (OUTPATIENT)
Dept: OPHTHALMOLOGY | Facility: CLINIC | Age: 65
End: 2020-12-10

## 2020-12-10 ENCOUNTER — PATIENT MESSAGE (OUTPATIENT)
Dept: NEUROSURGERY | Facility: CLINIC | Age: 65
End: 2020-12-10

## 2021-01-04 ENCOUNTER — TELEPHONE (OUTPATIENT)
Dept: OPHTHALMOLOGY | Facility: CLINIC | Age: 66
End: 2021-01-04

## 2021-01-19 ENCOUNTER — PATIENT MESSAGE (OUTPATIENT)
Dept: OPHTHALMOLOGY | Facility: CLINIC | Age: 66
End: 2021-01-19

## 2021-01-25 ENCOUNTER — OFFICE VISIT (OUTPATIENT)
Dept: NEUROSURGERY | Facility: CLINIC | Age: 66
End: 2021-01-25
Payer: MEDICARE

## 2021-01-25 ENCOUNTER — OFFICE VISIT (OUTPATIENT)
Dept: OPHTHALMOLOGY | Facility: CLINIC | Age: 66
End: 2021-01-25
Payer: MEDICARE

## 2021-01-25 VITALS — BODY MASS INDEX: 30.53 KG/M2 | WEIGHT: 190 LBS | HEIGHT: 66 IN | TEMPERATURE: 97 F

## 2021-01-25 DIAGNOSIS — I63.9 OCCIPITAL INFARCTION: ICD-10-CM

## 2021-01-25 DIAGNOSIS — I67.1 CEREBRAL ANEURYSM: Primary | ICD-10-CM

## 2021-01-25 DIAGNOSIS — H57.11 PAIN IN RIGHT EYE: Primary | ICD-10-CM

## 2021-01-25 PROCEDURE — 99999 PR PBB SHADOW E&M-EST. PATIENT-LVL III: ICD-10-PCS | Mod: PBBFAC,,, | Performed by: NEUROLOGICAL SURGERY

## 2021-01-25 PROCEDURE — 99213 OFFICE O/P EST LOW 20 MIN: CPT | Mod: S$PBB,,, | Performed by: NEUROLOGICAL SURGERY

## 2021-01-25 PROCEDURE — 92012 INTRM OPH EXAM EST PATIENT: CPT | Mod: S$PBB,,, | Performed by: OPHTHALMOLOGY

## 2021-01-25 PROCEDURE — 99999 PR PBB SHADOW E&M-EST. PATIENT-LVL III: ICD-10-PCS | Mod: PBBFAC,,, | Performed by: OPHTHALMOLOGY

## 2021-01-25 PROCEDURE — 99213 OFFICE O/P EST LOW 20 MIN: CPT | Mod: PBBFAC | Performed by: NEUROLOGICAL SURGERY

## 2021-01-25 PROCEDURE — 92012 PR EYE EXAM, EST PATIENT,INTERMED: ICD-10-PCS | Mod: S$PBB,,, | Performed by: OPHTHALMOLOGY

## 2021-01-25 PROCEDURE — 99999 PR PBB SHADOW E&M-EST. PATIENT-LVL III: CPT | Mod: PBBFAC,,, | Performed by: NEUROLOGICAL SURGERY

## 2021-01-25 PROCEDURE — 99213 PR OFFICE/OUTPT VISIT, EST, LEVL III, 20-29 MIN: ICD-10-PCS | Mod: S$PBB,,, | Performed by: NEUROLOGICAL SURGERY

## 2021-01-25 PROCEDURE — 99213 OFFICE O/P EST LOW 20 MIN: CPT | Mod: PBBFAC,27 | Performed by: OPHTHALMOLOGY

## 2021-01-25 PROCEDURE — 99999 PR PBB SHADOW E&M-EST. PATIENT-LVL III: CPT | Mod: PBBFAC,,, | Performed by: OPHTHALMOLOGY

## 2021-01-25 RX ORDER — NEOMYCIN SULFATE, POLYMYXIN B SULFATE AND DEXAMETHASONE 3.5; 10000; 1 MG/ML; [USP'U]/ML; MG/ML
SUSPENSION/ DROPS OPHTHALMIC
COMMUNITY
Start: 2020-11-30

## 2021-01-25 RX ORDER — BENZONATATE 100 MG/1
CAPSULE ORAL
COMMUNITY
Start: 2020-12-12 | End: 2021-09-22

## 2021-03-09 ENCOUNTER — PATIENT MESSAGE (OUTPATIENT)
Dept: NEUROSURGERY | Facility: CLINIC | Age: 66
End: 2021-03-09

## 2021-03-09 ENCOUNTER — OFFICE VISIT (OUTPATIENT)
Dept: OPHTHALMOLOGY | Facility: CLINIC | Age: 66
End: 2021-03-09
Payer: MEDICARE

## 2021-03-09 ENCOUNTER — CLINICAL SUPPORT (OUTPATIENT)
Dept: OPHTHALMOLOGY | Facility: CLINIC | Age: 66
End: 2021-03-09
Payer: MEDICARE

## 2021-03-09 ENCOUNTER — PATIENT MESSAGE (OUTPATIENT)
Dept: OPHTHALMOLOGY | Facility: CLINIC | Age: 66
End: 2021-03-09

## 2021-03-09 DIAGNOSIS — H53.461 RIGHT HOMONYMOUS HEMIANOPSIA: Primary | ICD-10-CM

## 2021-03-09 DIAGNOSIS — H53.461 RIGHT HOMONYMOUS HEMIANOPSIA: ICD-10-CM

## 2021-03-09 PROCEDURE — 99999 PR PBB SHADOW E&M-EST. PATIENT-LVL III: CPT | Mod: PBBFAC,,, | Performed by: OPHTHALMOLOGY

## 2021-03-09 PROCEDURE — 92012 INTRM OPH EXAM EST PATIENT: CPT | Mod: S$PBB,,, | Performed by: OPHTHALMOLOGY

## 2021-03-09 PROCEDURE — 92012 PR EYE EXAM, EST PATIENT,INTERMED: ICD-10-PCS | Mod: S$PBB,,, | Performed by: OPHTHALMOLOGY

## 2021-03-09 PROCEDURE — 99999 PR PBB SHADOW E&M-EST. PATIENT-LVL III: ICD-10-PCS | Mod: PBBFAC,,, | Performed by: OPHTHALMOLOGY

## 2021-03-09 PROCEDURE — 99213 OFFICE O/P EST LOW 20 MIN: CPT | Mod: PBBFAC | Performed by: OPHTHALMOLOGY

## 2021-03-23 ENCOUNTER — PATIENT MESSAGE (OUTPATIENT)
Dept: NEUROSURGERY | Facility: CLINIC | Age: 66
End: 2021-03-23

## 2021-04-26 ENCOUNTER — OFFICE VISIT (OUTPATIENT)
Dept: NEUROSURGERY | Facility: CLINIC | Age: 66
End: 2021-04-26
Payer: MEDICARE

## 2021-04-26 ENCOUNTER — PATIENT MESSAGE (OUTPATIENT)
Dept: NEUROSURGERY | Facility: CLINIC | Age: 66
End: 2021-04-26

## 2021-04-26 VITALS
TEMPERATURE: 98 F | HEIGHT: 66 IN | BODY MASS INDEX: 30.53 KG/M2 | SYSTOLIC BLOOD PRESSURE: 160 MMHG | DIASTOLIC BLOOD PRESSURE: 81 MMHG | HEART RATE: 71 BPM | WEIGHT: 190 LBS

## 2021-04-26 DIAGNOSIS — I67.1 CEREBRAL ANEURYSM: ICD-10-CM

## 2021-04-26 DIAGNOSIS — I63.432 CEREBRAL INFARCTION DUE TO EMBOLISM OF LEFT POSTERIOR CEREBRAL ARTERY: Primary | ICD-10-CM

## 2021-04-26 PROCEDURE — 99999 PR PBB SHADOW E&M-EST. PATIENT-LVL IV: ICD-10-PCS | Mod: PBBFAC,,, | Performed by: NEUROLOGICAL SURGERY

## 2021-04-26 PROCEDURE — 99999 PR PBB SHADOW E&M-EST. PATIENT-LVL IV: CPT | Mod: PBBFAC,,, | Performed by: NEUROLOGICAL SURGERY

## 2021-04-26 PROCEDURE — 99214 OFFICE O/P EST MOD 30 MIN: CPT | Mod: PBBFAC | Performed by: NEUROLOGICAL SURGERY

## 2021-04-26 PROCEDURE — 99213 OFFICE O/P EST LOW 20 MIN: CPT | Mod: S$PBB,,, | Performed by: NEUROLOGICAL SURGERY

## 2021-04-26 PROCEDURE — 99213 PR OFFICE/OUTPT VISIT, EST, LEVL III, 20-29 MIN: ICD-10-PCS | Mod: S$PBB,,, | Performed by: NEUROLOGICAL SURGERY

## 2021-04-29 ENCOUNTER — PATIENT MESSAGE (OUTPATIENT)
Dept: RESEARCH | Facility: HOSPITAL | Age: 66
End: 2021-04-29

## 2021-06-03 ENCOUNTER — TELEPHONE (OUTPATIENT)
Dept: OPHTHALMOLOGY | Facility: CLINIC | Age: 66
End: 2021-06-03

## 2021-06-03 ENCOUNTER — PATIENT MESSAGE (OUTPATIENT)
Dept: OPHTHALMOLOGY | Facility: CLINIC | Age: 66
End: 2021-06-03

## 2021-07-24 ENCOUNTER — HOSPITAL ENCOUNTER (EMERGENCY)
Facility: HOSPITAL | Age: 66
Discharge: HOME | End: 2021-07-24
Attending: EMERGENCY MEDICINE
Payer: MEDICARE

## 2021-07-24 VITALS
DIASTOLIC BLOOD PRESSURE: 86 MMHG | BODY MASS INDEX: 37.29 KG/M2 | SYSTOLIC BLOOD PRESSURE: 166 MMHG | HEART RATE: 74 BPM | TEMPERATURE: 99 F | OXYGEN SATURATION: 97 % | WEIGHT: 231.06 LBS | RESPIRATION RATE: 24 BRPM
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
## 2021-07-24 DIAGNOSIS — M79.641 RIGHT HAND PAIN: ICD-10-CM

## 2021-07-24 DIAGNOSIS — S09.90XA CLOSED HEAD INJURY, INITIAL ENCOUNTER: ICD-10-CM

## 2021-07-24 DIAGNOSIS — T14.8XXA HEMATOMA: Primary | ICD-10-CM

## 2021-07-24 DIAGNOSIS — R79.1 PROLONGED BLEEDING TIME: ICD-10-CM

## 2021-07-24 LAB
ALBUMIN SERPL BCP-MCNC: 4 G/DL (ref 3.5–5.2)
ALP SERPL-CCNC: 85 U/L (ref 55–135)
ALT SERPL W/O P-5'-P-CCNC: 31 U/L (ref 10–44)
ANION GAP SERPL CALC-SCNC: 13 MMOL/L (ref 8–16)
APTT BLDCRRT: 22.8 SEC (ref 21–32)
AST SERPL-CCNC: 25 U/L (ref 10–40)
BASOPHILS # BLD AUTO: 0.09 K/UL (ref 0–0.2)
BASOPHILS NFR BLD: 0.9 % (ref 0–1.9)
BILIRUB SERPL-MCNC: 0.4 MG/DL (ref 0.1–1)
BNP SERPL-MCNC: 24 PG/ML (ref 0–99)
BUN SERPL-MCNC: 13 MG/DL (ref 8–23)
CALCIUM SERPL-MCNC: 8.8 MG/DL (ref 8.7–10.5)
CHLORIDE SERPL-SCNC: 111 MMOL/L (ref 95–110)
CO2 SERPL-SCNC: 17 MMOL/L (ref 23–29)
CREAT SERPL-MCNC: 1 MG/DL (ref 0.5–1.4)
DIFFERENTIAL METHOD: ABNORMAL
EOSINOPHIL # BLD AUTO: 0.1 K/UL (ref 0–0.5)
EOSINOPHIL NFR BLD: 0.9 % (ref 0–8)
ERYTHROCYTE [DISTWIDTH] IN BLOOD BY AUTOMATED COUNT: 13.4 % (ref 11.5–14.5)
EST. GFR  (AFRICAN AMERICAN): >60 ML/MIN/1.73 M^2
EST. GFR  (NON AFRICAN AMERICAN): 59 ML/MIN/1.73 M^2
GLUCOSE SERPL-MCNC: 122 MG/DL (ref 70–110)
HCT VFR BLD AUTO: 41 % (ref 37–48.5)
HGB BLD-MCNC: 13.6 G/DL (ref 12–16)
IMM GRANULOCYTES # BLD AUTO: 0.04 K/UL (ref 0–0.04)
IMM GRANULOCYTES NFR BLD AUTO: 0.4 % (ref 0–0.5)
INR PPP: 0.9 (ref 0.8–1.2)
LYMPHOCYTES # BLD AUTO: 1.7 K/UL (ref 1–4.8)
LYMPHOCYTES NFR BLD: 16.9 % (ref 18–48)
MCH RBC QN AUTO: 29.1 PG (ref 27–31)
MCHC RBC AUTO-ENTMCNC: 33.2 G/DL (ref 32–36)
MCV RBC AUTO: 88 FL (ref 82–98)
MONOCYTES # BLD AUTO: 0.7 K/UL (ref 0.3–1)
MONOCYTES NFR BLD: 6.7 % (ref 4–15)
NEUTROPHILS # BLD AUTO: 7.5 K/UL (ref 1.8–7.7)
NEUTROPHILS NFR BLD: 74.2 % (ref 38–73)
NRBC BLD-RTO: 0 /100 WBC
PLATELET # BLD AUTO: 307 K/UL (ref 150–450)
PMV BLD AUTO: 9.6 FL (ref 9.2–12.9)
POTASSIUM SERPL-SCNC: 3.9 MMOL/L (ref 3.5–5.1)
PROT SERPL-MCNC: 6.9 G/DL (ref 6–8.4)
PROTHROMBIN TIME: 10.4 SEC (ref 9–12.5)
RBC # BLD AUTO: 4.68 M/UL (ref 4–5.4)
SODIUM SERPL-SCNC: 141 MMOL/L (ref 136–145)
TROPONIN I SERPL DL<=0.01 NG/ML-MCNC: 0.02 NG/ML (ref 0–0.03)
WBC # BLD AUTO: 10.07 K/UL (ref 3.9–12.7)

## 2021-07-24 PROCEDURE — 85610 PROTHROMBIN TIME: CPT | Performed by: EMERGENCY MEDICINE

## 2021-07-24 PROCEDURE — 80053 COMPREHEN METABOLIC PANEL: CPT | Performed by: EMERGENCY MEDICINE

## 2021-07-24 PROCEDURE — 83880 ASSAY OF NATRIURETIC PEPTIDE: CPT | Performed by: EMERGENCY MEDICINE

## 2021-07-24 PROCEDURE — 84484 ASSAY OF TROPONIN QUANT: CPT | Performed by: EMERGENCY MEDICINE

## 2021-07-24 PROCEDURE — 85025 COMPLETE CBC W/AUTO DIFF WBC: CPT | Performed by: EMERGENCY MEDICINE

## 2021-07-24 PROCEDURE — 93010 EKG 12-LEAD: ICD-10-PCS | Mod: ,,, | Performed by: INTERNAL MEDICINE

## 2021-07-24 PROCEDURE — 25000003 PHARM REV CODE 250: Performed by: EMERGENCY MEDICINE

## 2021-07-24 PROCEDURE — 93010 ELECTROCARDIOGRAM REPORT: CPT | Mod: ,,, | Performed by: INTERNAL MEDICINE

## 2021-07-24 PROCEDURE — 93005 ELECTROCARDIOGRAM TRACING: CPT

## 2021-07-24 PROCEDURE — 85730 THROMBOPLASTIN TIME PARTIAL: CPT | Performed by: EMERGENCY MEDICINE

## 2021-07-24 PROCEDURE — 12011 RPR F/E/E/N/L/M 2.5 CM/<: CPT

## 2021-07-24 PROCEDURE — 96374 THER/PROPH/DIAG INJ IV PUSH: CPT | Mod: 59

## 2021-07-24 PROCEDURE — 99285 EMERGENCY DEPT VISIT HI MDM: CPT | Mod: 25

## 2021-07-24 RX ORDER — ONDANSETRON 8 MG/1
8 TABLET, ORALLY DISINTEGRATING ORAL EVERY 6 HOURS PRN
Qty: 20 TABLET | Refills: 0 | Status: SHIPPED | OUTPATIENT
Start: 2021-07-24 | End: 2021-10-28

## 2021-07-24 RX ORDER — TRANEXAMIC ACID 100 MG/ML
1000 INJECTION, SOLUTION INTRAVENOUS
Status: COMPLETED | OUTPATIENT
Start: 2021-07-24 | End: 2021-07-24

## 2021-07-24 RX ORDER — LIDOCAINE HYDROCHLORIDE 10 MG/ML
10 INJECTION, SOLUTION EPIDURAL; INFILTRATION; INTRACAUDAL; PERINEURAL
Status: COMPLETED | OUTPATIENT
Start: 2021-07-24 | End: 2021-07-24

## 2021-07-24 RX ORDER — ACETAMINOPHEN 500 MG
1000 TABLET ORAL
Status: COMPLETED | OUTPATIENT
Start: 2021-07-24 | End: 2021-07-24

## 2021-07-24 RX ORDER — HYDROCODONE BITARTRATE AND ACETAMINOPHEN 5; 325 MG/1; MG/1
1 TABLET ORAL EVERY 4 HOURS PRN
Qty: 8 TABLET | Refills: 0 | Status: SHIPPED | OUTPATIENT
Start: 2021-07-24 | End: 2021-10-28

## 2021-07-24 RX ADMIN — TRANEXAMIC ACID 1000 MG: 100 INJECTION, SOLUTION INTRAVENOUS at 08:07

## 2021-07-24 RX ADMIN — LIDOCAINE HYDROCHLORIDE 100 MG: 10 INJECTION, SOLUTION EPIDURAL; INFILTRATION; INTRACAUDAL at 06:07

## 2021-07-24 RX ADMIN — ACETAMINOPHEN 1000 MG: 500 TABLET ORAL at 08:07

## 2021-07-25 ENCOUNTER — PATIENT MESSAGE (OUTPATIENT)
Dept: NEUROSURGERY | Facility: CLINIC | Age: 66
End: 2021-07-25

## 2021-08-23 ENCOUNTER — TELEPHONE (OUTPATIENT)
Dept: OPHTHALMOLOGY | Facility: CLINIC | Age: 66
End: 2021-08-23

## 2021-09-03 ENCOUNTER — PATIENT MESSAGE (OUTPATIENT)
Dept: NEUROSURGERY | Facility: CLINIC | Age: 66
End: 2021-09-03

## 2021-09-13 ENCOUNTER — PATIENT MESSAGE (OUTPATIENT)
Dept: OPHTHALMOLOGY | Facility: CLINIC | Age: 66
End: 2021-09-13

## 2021-09-22 ENCOUNTER — CLINICAL SUPPORT (OUTPATIENT)
Dept: OPHTHALMOLOGY | Facility: CLINIC | Age: 66
End: 2021-09-22
Payer: MEDICARE

## 2021-09-22 ENCOUNTER — OFFICE VISIT (OUTPATIENT)
Dept: OPHTHALMOLOGY | Facility: CLINIC | Age: 66
End: 2021-09-22
Payer: MEDICARE

## 2021-09-22 DIAGNOSIS — H25.813 COMBINED FORM OF AGE-RELATED CATARACT, BOTH EYES: ICD-10-CM

## 2021-09-22 DIAGNOSIS — H53.461 RIGHT HOMONYMOUS HEMIANOPSIA: Primary | ICD-10-CM

## 2021-09-22 DIAGNOSIS — H53.461 RIGHT HOMONYMOUS HEMIANOPSIA: ICD-10-CM

## 2021-09-22 PROCEDURE — 99213 OFFICE O/P EST LOW 20 MIN: CPT | Mod: PBBFAC | Performed by: OPHTHALMOLOGY

## 2021-09-22 PROCEDURE — 99213 PR OFFICE/OUTPT VISIT, EST, LEVL III, 20-29 MIN: ICD-10-PCS | Mod: S$PBB,,, | Performed by: OPHTHALMOLOGY

## 2021-09-22 PROCEDURE — 99999 PR PBB SHADOW E&M-EST. PATIENT-LVL III: CPT | Mod: PBBFAC,,, | Performed by: OPHTHALMOLOGY

## 2021-09-22 PROCEDURE — 99999 PR PBB SHADOW E&M-EST. PATIENT-LVL III: ICD-10-PCS | Mod: PBBFAC,,, | Performed by: OPHTHALMOLOGY

## 2021-09-22 PROCEDURE — 99213 OFFICE O/P EST LOW 20 MIN: CPT | Mod: S$PBB,,, | Performed by: OPHTHALMOLOGY

## 2021-10-04 ENCOUNTER — PATIENT MESSAGE (OUTPATIENT)
Dept: NEUROSURGERY | Facility: CLINIC | Age: 66
End: 2021-10-04

## 2021-10-28 ENCOUNTER — OFFICE VISIT (OUTPATIENT)
Dept: NEUROSURGERY | Facility: CLINIC | Age: 66
End: 2021-10-28
Payer: MEDICARE

## 2021-10-28 ENCOUNTER — PATIENT MESSAGE (OUTPATIENT)
Dept: NEUROSURGERY | Facility: CLINIC | Age: 66
End: 2021-10-28

## 2021-10-28 VITALS
SYSTOLIC BLOOD PRESSURE: 174 MMHG | BODY MASS INDEX: 37.12 KG/M2 | WEIGHT: 231 LBS | HEIGHT: 66 IN | HEART RATE: 64 BPM | TEMPERATURE: 97 F | DIASTOLIC BLOOD PRESSURE: 82 MMHG

## 2021-10-28 DIAGNOSIS — I67.1 CEREBRAL ANEURYSM: Primary | ICD-10-CM

## 2021-10-28 DIAGNOSIS — S06.0X1D CEREBRAL CONCUSSION, WITH LOSS OF CONSCIOUSNESS OF 30 MINUTES OR LESS, SUBSEQUENT ENCOUNTER: ICD-10-CM

## 2021-10-28 DIAGNOSIS — I63.532: ICD-10-CM

## 2021-10-28 PROCEDURE — 99213 PR OFFICE/OUTPT VISIT, EST, LEVL III, 20-29 MIN: ICD-10-PCS | Mod: S$PBB,,, | Performed by: NEUROLOGICAL SURGERY

## 2021-10-28 PROCEDURE — 99214 OFFICE O/P EST MOD 30 MIN: CPT | Mod: PBBFAC | Performed by: NEUROLOGICAL SURGERY

## 2021-10-28 PROCEDURE — 99213 OFFICE O/P EST LOW 20 MIN: CPT | Mod: S$PBB,,, | Performed by: NEUROLOGICAL SURGERY

## 2021-10-28 PROCEDURE — 99999 PR PBB SHADOW E&M-EST. PATIENT-LVL IV: ICD-10-PCS | Mod: PBBFAC,,, | Performed by: NEUROLOGICAL SURGERY

## 2021-10-28 PROCEDURE — 99999 PR PBB SHADOW E&M-EST. PATIENT-LVL IV: CPT | Mod: PBBFAC,,, | Performed by: NEUROLOGICAL SURGERY

## 2021-10-28 RX ORDER — NICOTINE POLACRILEX 2 MG
GUM BUCCAL
COMMUNITY

## 2021-12-15 ENCOUNTER — PATIENT MESSAGE (OUTPATIENT)
Dept: NEUROSURGERY | Facility: CLINIC | Age: 66
End: 2021-12-15
Payer: MEDICARE

## 2021-12-15 ENCOUNTER — TELEPHONE (OUTPATIENT)
Dept: NEUROSURGERY | Facility: CLINIC | Age: 66
End: 2021-12-15
Payer: MEDICARE

## 2021-12-15 DIAGNOSIS — I67.1 CEREBRAL ANEURYSM: Primary | ICD-10-CM

## 2022-01-24 ENCOUNTER — HOSPITAL ENCOUNTER (OUTPATIENT)
Dept: RADIOLOGY | Facility: HOSPITAL | Age: 67
Discharge: HOME | End: 2022-01-24
Attending: NEUROLOGICAL SURGERY
Payer: MEDICARE

## 2022-01-24 ENCOUNTER — OFFICE VISIT (OUTPATIENT)
Dept: NEUROSURGERY | Facility: CLINIC | Age: 67
End: 2022-01-24
Payer: MEDICARE

## 2022-01-24 VITALS
HEIGHT: 66 IN | SYSTOLIC BLOOD PRESSURE: 144 MMHG | HEART RATE: 80 BPM | BODY MASS INDEX: 31.34 KG/M2 | DIASTOLIC BLOOD PRESSURE: 77 MMHG | WEIGHT: 195 LBS

## 2022-01-24 DIAGNOSIS — I67.1 CEREBRAL ANEURYSM: ICD-10-CM

## 2022-01-24 DIAGNOSIS — I67.1 CEREBRAL ANEURYSM: Primary | ICD-10-CM

## 2022-01-24 DIAGNOSIS — H53.461 RIGHT HOMONYMOUS HEMIANOPSIA: ICD-10-CM

## 2022-01-24 PROCEDURE — 99213 OFFICE O/P EST LOW 20 MIN: CPT | Mod: S$PBB,,, | Performed by: NEUROLOGICAL SURGERY

## 2022-01-24 PROCEDURE — 70546 MR ANGIOGRAPH HEAD W/O&W/DYE: CPT | Mod: 26,,, | Performed by: RADIOLOGY

## 2022-01-24 PROCEDURE — 99999 PR PBB SHADOW E&M-EST. PATIENT-LVL IV: ICD-10-PCS | Mod: PBBFAC,,, | Performed by: NEUROLOGICAL SURGERY

## 2022-01-24 PROCEDURE — 25500020 PHARM REV CODE 255: Performed by: NEUROLOGICAL SURGERY

## 2022-01-24 PROCEDURE — A9585 GADOBUTROL INJECTION: HCPCS | Performed by: NEUROLOGICAL SURGERY

## 2022-01-24 PROCEDURE — 99999 PR PBB SHADOW E&M-EST. PATIENT-LVL IV: CPT | Mod: PBBFAC,,, | Performed by: NEUROLOGICAL SURGERY

## 2022-01-24 PROCEDURE — 70546 MRA BRAIN WITH AND WITHOUT: ICD-10-PCS | Mod: 26,,, | Performed by: RADIOLOGY

## 2022-01-24 PROCEDURE — 99213 PR OFFICE/OUTPT VISIT, EST, LEVL III, 20-29 MIN: ICD-10-PCS | Mod: S$PBB,,, | Performed by: NEUROLOGICAL SURGERY

## 2022-01-24 PROCEDURE — 70546 MR ANGIOGRAPH HEAD W/O&W/DYE: CPT | Mod: TC

## 2022-01-24 PROCEDURE — 99214 OFFICE O/P EST MOD 30 MIN: CPT | Mod: PBBFAC,25 | Performed by: NEUROLOGICAL SURGERY

## 2022-01-24 RX ORDER — GADOBUTROL 604.72 MG/ML
10 INJECTION INTRAVENOUS
Status: COMPLETED | OUTPATIENT
Start: 2022-01-24 | End: 2022-01-24

## 2022-01-24 RX ORDER — METRONIDAZOLE 7.5 MG/G
1 CREAM TOPICAL 2 TIMES DAILY
COMMUNITY
Start: 2022-01-03 | End: 2023-01-03

## 2022-01-24 RX ADMIN — GADOBUTROL 10 ML: 604.72 INJECTION INTRAVENOUS at 11:01

## 2022-01-24 NOTE — PROGRESS NOTES
Adriana Fonseca was seen in neurosurgical follow-up at the office today.  Her vision remains the same with a right homonymous hemianopsia.  She is able to read but has to go back and forth and it takes her longer to complete a page.  She sometimes feels a little off balance, more when she is standing than when she is walking.  She has noted no focal weakness of the extremities or difficulty with speech.  She is to be seen in ophthalmology follow up later this spring for cataracts.    On examination today she is bright and alert and speaking clearly.  Extraocular movements are good and pupils are equal.  There is right visual field loss.  She shows no weakness in the extremities and stood and walked without difficulty today.    MRA of the brain was repeated [facility] today.  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  There is some magnetic distortion of the supraclinoid carotid from the flow diversion device but good filling of the anterior and middle cerebral arteries.  The study is otherwise unremarkable.  The MRA has not been reviewed by Neuro Radiology yet.    She will follow-up with her ophthalmologist.  I will have her return in about 1 year with MRA of the brain to continue to monitor the aneurysm repair.

## 2022-10-04 NOTE — ANESTHESIA PREPROCEDURE EVALUATION
10/26/2020  Adriana Fonseca is a 65 y.o., female presents for cerebral aneurysm coiling. PONV with prior anesthetic. Appropriately NPO. Took her dilt this morning.      Active Problem List with Overview Notes    Diagnosis Date Noted    Cerebral aneurysm, nonruptured 10/26/2020    Subarachnoid hemorrhage due to ruptured aneurysm 11/13/2019    Hyperlipidemia, mixed 04/20/2019    GERD without esophagitis 04/20/2019    Compensated respiratory alkalosis 04/19/2019    Seizure prophylaxis     Vasogenic cerebral edema 04/17/2019    S/P coil embolization of cerebral aneurysm 04/17/2019    SAH (subarachnoid hemorrhage) 04/16/2019    Essential hypertension 04/16/2019    Headache 04/16/2019    Thyroid disease 04/16/2019     (Not in a hospital admission)      Review of patient's allergies indicates:  No Known Allergies    Past Medical History:   Diagnosis Date    Hyperlipemia     Hypertension     Thyroid disease      Past Surgical History:   Procedure Laterality Date    CEREBRAL ANGIOGRAM Bilateral 4/17/2019    Procedure: ANGIOGRAM-CEREBRAL;  Surgeon: Lamar Surgeon;  Location: [facility];  Service: Anesthesiology;  Laterality: Bilateral;    CEREBRAL ANGIOGRAM N/A 11/13/2019    Procedure: ANGIOGRAM-CEREBRAL;  Surgeon: Radha Diagnostic Provider;  Location: [facility] OR [address] [place] [address];  Service: Radiology;  Laterality: N/A;  Rm 190/Diego    HYSTERECTOMY       Tobacco Use    Smoking status: Never Smoker    Smokeless tobacco: Never Used   Substance and Sexual Activity    Alcohol use: Not Currently    Drug use: Never    Sexual activity: Not on file       Objective:     Vital Signs (Most Recent):  Temp: 36.7 °C (98 °F) (10/26/20 1103)  Pulse: 69 (10/26/20 1103)  Resp: 16 (10/26/20 1103)  BP: (!) 150/68 (10/26/20 1103)  SpO2: 100 % (10/26/20 1103) Vital Signs (24h Range):  Temp:  [36.7 °C (98 °F)] 36.7 °C (98 °F)  Pulse:   [69] 69  Resp:  [16] 16  SpO2:  [100 %] 100 %  BP: (150)/(68) 150/68     Weight: 86.2 kg (190 lb)  Body mass index is 30.67 kg/m².        Significant Labs:  All pertinent labs from the last 24 hours have been reviewed.    CBC:   Recent Labs     10/26/20  1025   WBC 6.10   RBC 4.64   HGB 13.6   HCT 42.7      MCV 92   MCH 29.3   MCHC 31.9*       CMP:   Recent Labs     10/26/20  1025      K 4.1      CO2 24   BUN 11   CREATININE 0.9      CALCIUM 8.9   ALBUMIN 4.0   PROT 7.0   ALKPHOS 84   ALT 20   AST 20   BILITOT 0.6       INR  Recent Labs     10/26/20  1025   INR 1.0         Anesthesia Evaluation    I have reviewed the Patient Summary Reports.    I have reviewed the Nursing Notes. I have reviewed the NPO Status.   I have reviewed the Medications.     Review of Systems  Anesthesia Hx:  Personal Hx of Anesthesia complications, Post-Operative Nausea/Vomiting       Physical Exam  General:  Well nourished    Airway/Jaw/Neck:  Airway Findings: Mouth Opening: Normal Tongue: Normal  General Airway Assessment: Adult  Mallampati: I  TM Distance: Normal, at least 6 cm  Jaw/Neck Findings:     Neck ROM: Normal ROM      Dental:  Dental Findings: In tact   Chest/Lungs:  Chest/Lungs Findings: Clear to auscultation, Normal Respiratory Rate     Heart/Vascular:  Heart Findings: Rate: Normal  Rhythm: Regular Rhythm  Sounds: Normal        Mental Status:  Mental Status Findings:  Cooperative, Alert and Oriented         Anesthesia Plan  Type of Anesthesia, risks & benefits discussed:  Anesthesia Type:  general  Patient's Preference:   Intra-op Monitoring Plan: arterial line  Intra-op Monitoring Plan Comments:   Post Op Pain Control Plan: IV/PO Opioids PRN, per primary service following discharge from PACU and multimodal analgesia  Post Op Pain Control Plan Comments:   Induction:   IV  Beta Blocker:  Patient is not currently on a Beta-Blocker (No further documentation required).       Informed Consent: Patient  understands risks and agrees with Anesthesia plan.  Questions answered. Anesthesia consent signed with patient.  ASA Score: 3     Day of Surgery Review of History & Physical:    H&P update referred to the surgeon.         Ready For Surgery From Anesthesia Perspective.        Patient is not pregnant (male or female)

## 2022-12-14 ENCOUNTER — PATIENT MESSAGE (OUTPATIENT)
Dept: NEUROSURGERY | Facility: CLINIC | Age: 67
End: 2022-12-14
Payer: MEDICARE

## 2022-12-14 ENCOUNTER — TELEPHONE (OUTPATIENT)
Dept: NEUROSURGERY | Facility: CLINIC | Age: 67
End: 2022-12-14
Payer: MEDICARE

## 2022-12-14 DIAGNOSIS — I67.1 CEREBRAL ANEURYSM: Primary | ICD-10-CM

## 2023-01-27 ENCOUNTER — PATIENT MESSAGE (OUTPATIENT)
Dept: NEUROSURGERY | Facility: CLINIC | Age: 68
End: 2023-01-27
Payer: MEDICARE

## 2023-01-30 ENCOUNTER — PATIENT MESSAGE (OUTPATIENT)
Dept: NEUROSURGERY | Facility: CLINIC | Age: 68
End: 2023-01-30
Payer: MEDICARE

## 2023-01-31 ENCOUNTER — PATIENT MESSAGE (OUTPATIENT)
Dept: NEUROSURGERY | Facility: CLINIC | Age: 68
End: 2023-01-31
Payer: MEDICARE

## 2023-02-01 ENCOUNTER — PATIENT MESSAGE (OUTPATIENT)
Dept: NEUROSURGERY | Facility: CLINIC | Age: 68
End: 2023-02-01
Payer: MEDICARE

## 2023-02-14 ENCOUNTER — PATIENT MESSAGE (OUTPATIENT)
Dept: NEUROSURGERY | Facility: CLINIC | Age: 68
End: 2023-02-14
Payer: MEDICARE

## 2023-02-15 ENCOUNTER — PATIENT MESSAGE (OUTPATIENT)
Dept: NEUROSURGERY | Facility: CLINIC | Age: 68
End: 2023-02-15
Payer: MEDICARE

## 2023-03-09 ENCOUNTER — HOSPITAL ENCOUNTER (OUTPATIENT)
Dept: RADIOLOGY | Facility: HOSPITAL | Age: 68
Discharge: HOME | End: 2023-03-09
Attending: NEUROLOGICAL SURGERY
Payer: MEDICARE

## 2023-03-09 ENCOUNTER — OFFICE VISIT (OUTPATIENT)
Dept: NEUROSURGERY | Facility: CLINIC | Age: 68
End: 2023-03-09
Payer: MEDICARE

## 2023-03-09 VITALS
DIASTOLIC BLOOD PRESSURE: 75 MMHG | WEIGHT: 195 LBS | BODY MASS INDEX: 31.34 KG/M2 | HEIGHT: 66 IN | SYSTOLIC BLOOD PRESSURE: 140 MMHG | HEART RATE: 57 BPM

## 2023-03-09 DIAGNOSIS — I60.8 SUBARACHNOID HEMORRHAGE DUE TO CEREBRAL ANEURYSM: Primary | ICD-10-CM

## 2023-03-09 DIAGNOSIS — I67.1 CEREBRAL ANEURYSM: ICD-10-CM

## 2023-03-09 DIAGNOSIS — H53.461 RIGHT HOMONYMOUS HEMIANOPSIA: ICD-10-CM

## 2023-03-09 PROCEDURE — 99999 PR PBB SHADOW E&M-EST. PATIENT-LVL IV: CPT | Mod: PBBFAC,,, | Performed by: NEUROLOGICAL SURGERY

## 2023-03-09 PROCEDURE — 70546 MR ANGIOGRAPH HEAD W/O&W/DYE: CPT | Mod: TC

## 2023-03-09 PROCEDURE — 99214 OFFICE O/P EST MOD 30 MIN: CPT | Mod: PBBFAC,25 | Performed by: NEUROLOGICAL SURGERY

## 2023-03-09 PROCEDURE — A9585 GADOBUTROL INJECTION: HCPCS | Performed by: NEUROLOGICAL SURGERY

## 2023-03-09 PROCEDURE — 70546 MRA BRAIN WITH AND WITHOUT: ICD-10-PCS | Mod: 26,,, | Performed by: RADIOLOGY

## 2023-03-09 PROCEDURE — 99999 PR PBB SHADOW E&M-EST. PATIENT-LVL IV: ICD-10-PCS | Mod: PBBFAC,,, | Performed by: NEUROLOGICAL SURGERY

## 2023-03-09 PROCEDURE — 99213 OFFICE O/P EST LOW 20 MIN: CPT | Mod: S$PBB,,, | Performed by: NEUROLOGICAL SURGERY

## 2023-03-09 PROCEDURE — 25500020 PHARM REV CODE 255: Performed by: NEUROLOGICAL SURGERY

## 2023-03-09 PROCEDURE — 99213 PR OFFICE/OUTPT VISIT, EST, LEVL III, 20-29 MIN: ICD-10-PCS | Mod: S$PBB,,, | Performed by: NEUROLOGICAL SURGERY

## 2023-03-09 PROCEDURE — 70546 MR ANGIOGRAPH HEAD W/O&W/DYE: CPT | Mod: 26,,, | Performed by: RADIOLOGY

## 2023-03-09 RX ORDER — BENZONATATE 200 MG/1
200 CAPSULE ORAL
COMMUNITY
Start: 2023-02-09

## 2023-03-09 RX ORDER — LISINOPRIL 5 MG/1
5 TABLET ORAL EVERY MORNING
COMMUNITY
Start: 2023-01-26

## 2023-03-09 RX ORDER — GADOBUTROL 604.72 MG/ML
10 INJECTION INTRAVENOUS
Status: COMPLETED | OUTPATIENT
Start: 2023-03-09 | End: 2023-03-09

## 2023-03-09 RX ORDER — SODIUM, POTASSIUM,MAG SULFATES 17.5-3.13G
SOLUTION, RECONSTITUTED, ORAL ORAL
COMMUNITY
Start: 2023-02-14

## 2023-03-09 RX ORDER — RISEDRONATE SODIUM 35 MG/1
TABLET, FILM COATED ORAL
COMMUNITY
Start: 2023-02-13

## 2023-03-09 RX ORDER — NEBIVOLOL 5 MG/1
5 TABLET ORAL
COMMUNITY
Start: 2023-02-27

## 2023-03-09 RX ADMIN — GADOBUTROL 10 ML: 604.72 INJECTION INTRAVENOUS at 10:03

## 2023-03-09 NOTE — PROGRESS NOTES
Adriana Fonseca returned in neurosurgical follow-up to the office today.  She feels she may have a little more restricted vision in the right visual field.  She was not seen in neuro ophthalmological evaluation this past year.  Apparently cataracts were not dense enough to require surgery.  She has been having some pain bilaterally in the suboccipital area.  She still feels a little difficulty with her balance.    On brief examination she is alert and cooperative.  Extraocular movements are good and pupils equal.  There is a right homonymous hemianopsia.  She is somewhat tender over the occipital nerves bilaterally more on the right.  There may be some element of occipital neuralgia.  She seems otherwise neurologically intact.    MRA of the brain was repeated at [facility] today and compared to her earlier scans.  Again there is some distortion in the left internal carotid artery from the flow diverter device.  The anterior middle cerebral arteries fill out nicely and there is no evidence for recurrent aneurysm.  Neuro radiology has not reviewed the scan yet.    She will follow up with Dr. Gleason in neuro ophthalmology and I will have her return in about 1 year for follow-up MRA to continue to monitor the left internal carotid artery aneurysm treatment.

## 2023-03-28 ENCOUNTER — PATIENT MESSAGE (OUTPATIENT)
Dept: NEUROSURGERY | Facility: CLINIC | Age: 68
End: 2023-03-28
Payer: MEDICARE

## 2023-04-11 ENCOUNTER — PATIENT MESSAGE (OUTPATIENT)
Dept: NEUROSURGERY | Facility: CLINIC | Age: 68
End: 2023-04-11
Payer: MEDICARE

## 2023-06-29 ENCOUNTER — PATIENT MESSAGE (OUTPATIENT)
Dept: NEUROSURGERY | Facility: CLINIC | Age: 68
End: 2023-06-29
Payer: MEDICARE

## 2023-09-14 ENCOUNTER — PATIENT MESSAGE (OUTPATIENT)
Dept: NEUROSURGERY | Facility: CLINIC | Age: 68
End: 2023-09-14
Payer: MEDICARE

## 2024-01-29 ENCOUNTER — TELEPHONE (OUTPATIENT)
Dept: NEUROSURGERY | Facility: CLINIC | Age: 69
End: 2024-01-29
Payer: MEDICARE

## 2024-01-29 ENCOUNTER — PATIENT MESSAGE (OUTPATIENT)
Dept: NEUROSURGERY | Facility: CLINIC | Age: 69
End: 2024-01-29
Payer: MEDICARE

## 2024-01-29 DIAGNOSIS — I60.8 SUBARACHNOID HEMORRHAGE DUE TO CEREBRAL ANEURYSM: Primary | ICD-10-CM

## 2024-01-29 DIAGNOSIS — I67.1 CEREBRAL ANEURYSM: ICD-10-CM

## 2024-05-01 ENCOUNTER — PATIENT MESSAGE (OUTPATIENT)
Dept: NEUROSURGERY | Facility: CLINIC | Age: 69
End: 2024-05-01
Payer: MEDICARE

## 2024-05-02 ENCOUNTER — OFFICE VISIT (OUTPATIENT)
Dept: NEUROSURGERY | Facility: CLINIC | Age: 69
End: 2024-05-02
Payer: MEDICARE

## 2024-05-02 ENCOUNTER — HOSPITAL ENCOUNTER (OUTPATIENT)
Dept: RADIOLOGY | Facility: HOSPITAL | Age: 69
Discharge: HOME | End: 2024-05-02
Attending: NEUROLOGICAL SURGERY
Payer: MEDICARE

## 2024-05-02 VITALS
HEART RATE: 65 BPM | DIASTOLIC BLOOD PRESSURE: 84 MMHG | WEIGHT: 195.13 LBS | HEIGHT: 66 IN | SYSTOLIC BLOOD PRESSURE: 151 MMHG | BODY MASS INDEX: 31.36 KG/M2

## 2024-05-02 DIAGNOSIS — I60.8 SUBARACHNOID HEMORRHAGE DUE TO CEREBRAL ANEURYSM: Primary | ICD-10-CM

## 2024-05-02 DIAGNOSIS — I60.8 SUBARACHNOID HEMORRHAGE DUE TO CEREBRAL ANEURYSM: ICD-10-CM

## 2024-05-02 PROCEDURE — 70546 MR ANGIOGRAPH HEAD W/O&W/DYE: CPT | Mod: TC

## 2024-05-02 PROCEDURE — A9585 GADOBUTROL INJECTION: HCPCS | Performed by: NEUROLOGICAL SURGERY

## 2024-05-02 PROCEDURE — 99999 PR PBB SHADOW E&M-EST. PATIENT-LVL V: CPT | Mod: PBBFAC,,, | Performed by: NEUROLOGICAL SURGERY

## 2024-05-02 PROCEDURE — 70546 MR ANGIOGRAPH HEAD W/O&W/DYE: CPT | Mod: 26,,, | Performed by: RADIOLOGY

## 2024-05-02 PROCEDURE — 99213 OFFICE O/P EST LOW 20 MIN: CPT | Mod: S$PBB,,, | Performed by: NEUROLOGICAL SURGERY

## 2024-05-02 PROCEDURE — 99215 OFFICE O/P EST HI 40 MIN: CPT | Mod: PBBFAC,25 | Performed by: NEUROLOGICAL SURGERY

## 2024-05-02 PROCEDURE — 25500020 PHARM REV CODE 255: Performed by: NEUROLOGICAL SURGERY

## 2024-05-02 RX ORDER — GADOBUTROL 604.72 MG/ML
9 INJECTION INTRAVENOUS
Status: COMPLETED | OUTPATIENT
Start: 2024-05-02 | End: 2024-05-02

## 2024-05-02 RX ORDER — TIZANIDINE 4 MG/1
4 TABLET ORAL NIGHTLY PRN
COMMUNITY
Start: 2024-01-08

## 2024-05-02 RX ORDER — CYCLOBENZAPRINE HCL 5 MG
5 TABLET ORAL 3 TIMES DAILY PRN
COMMUNITY
Start: 2024-04-07

## 2024-05-02 RX ORDER — MELOXICAM 7.5 MG/1
1 TABLET ORAL DAILY
COMMUNITY
Start: 2024-04-01

## 2024-05-02 RX ADMIN — GADOBUTROL 9 ML: 604.72 INJECTION INTRAVENOUS at 01:05

## 2024-05-02 NOTE — PROGRESS NOTES
Adriana Fonseca was seen in neurosurgical follow-up at the office this afternoon.  She has been generally stable over the past year.  She says sometimes she feels like she is seeing better than other times.  She had recent visual fields done with an ophthalmologist in [place] documenting the right homonymous hemianopsia.  She is also experiencing cervical pain and tightness. There is no radiation of pain into the arms or weakness or numbness of the upper extremities.  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    On brief examination today she is alert and cooperative.  She sees fingers moving in the left visual field not the right.  Speech is clear.  She shows some tenderness to palpation over the neck but this is not  severe and there is some restriction of range of motion.  She stood and walked without difficulty and seems otherwise neurologically intact.    MRA of the brain was repeated at [facility] today and compared to earlier scans.  Again the left supraclinoid internal carotid artery is somewhat distorted with the stent and has less signal than on the right.  There is no evidence for recurrent aneurysm which seems to remain well coiled.    There is no definite evidence for recurrent aneurysm.  I believe we can follow this up in 1 year with MRA.  She will continue with ophthalmology follow-up in [place].

## 2024-05-14 ENCOUNTER — PATIENT MESSAGE (OUTPATIENT)
Dept: NEUROSURGERY | Facility: CLINIC | Age: 69
End: 2024-05-14
Payer: MEDICARE

## 2025-02-28 ENCOUNTER — PATIENT MESSAGE (OUTPATIENT)
Dept: NEUROSURGERY | Facility: CLINIC | Age: 70
End: 2025-02-28
Payer: MEDICARE

## 2025-03-05 ENCOUNTER — TELEPHONE (OUTPATIENT)
Dept: NEUROSURGERY | Facility: CLINIC | Age: 70
End: 2025-03-05

## 2025-03-05 DIAGNOSIS — I67.1 CEREBRAL ANEURYSM: ICD-10-CM

## 2025-03-05 DIAGNOSIS — I60.8 SUBARACHNOID HEMORRHAGE DUE TO CEREBRAL ANEURYSM: Primary | ICD-10-CM

## 2025-03-11 ENCOUNTER — PATIENT MESSAGE (OUTPATIENT)
Dept: NEUROSURGERY | Facility: CLINIC | Age: 70
End: 2025-03-11
Payer: MEDICARE

## 2025-03-12 ENCOUNTER — TELEPHONE (OUTPATIENT)
Dept: NEUROSURGERY | Facility: CLINIC | Age: 70
End: 2025-03-12
Payer: MEDICARE

## 2025-03-12 DIAGNOSIS — I60.8 SUBARACHNOID HEMORRHAGE DUE TO CEREBRAL ANEURYSM: ICD-10-CM

## 2025-03-12 DIAGNOSIS — M54.2 NECK PAIN: Primary | ICD-10-CM

## 2025-05-22 ENCOUNTER — HOSPITAL ENCOUNTER (OUTPATIENT)
Dept: RADIOLOGY | Facility: HOSPITAL | Age: 70
Discharge: HOME | End: 2025-05-22
Attending: NEUROLOGICAL SURGERY
Payer: MEDICARE

## 2025-05-22 ENCOUNTER — OFFICE VISIT (OUTPATIENT)
Dept: NEUROSURGERY | Facility: CLINIC | Age: 70
End: 2025-05-22
Payer: MEDICARE

## 2025-05-22 VITALS
WEIGHT: 195.13 LBS | SYSTOLIC BLOOD PRESSURE: 163 MMHG | HEIGHT: 66 IN | DIASTOLIC BLOOD PRESSURE: 74 MMHG | HEART RATE: 52 BPM | BODY MASS INDEX: 31.36 KG/M2

## 2025-05-22 DIAGNOSIS — I60.8 SUBARACHNOID HEMORRHAGE DUE TO CEREBRAL ANEURYSM: Primary | ICD-10-CM

## 2025-05-22 DIAGNOSIS — I60.8 SUBARACHNOID HEMORRHAGE DUE TO CEREBRAL ANEURYSM: ICD-10-CM

## 2025-05-22 DIAGNOSIS — M54.2 NECK PAIN: ICD-10-CM

## 2025-05-22 PROCEDURE — 99215 OFFICE O/P EST HI 40 MIN: CPT | Mod: PBBFAC,25 | Performed by: NEUROLOGICAL SURGERY

## 2025-05-22 PROCEDURE — 25500020 PHARM REV CODE 255: Performed by: NEUROLOGICAL SURGERY

## 2025-05-22 PROCEDURE — A9585 GADOBUTROL INJECTION: HCPCS | Performed by: NEUROLOGICAL SURGERY

## 2025-05-22 PROCEDURE — 72052 X-RAY EXAM NECK SPINE 6/>VWS: CPT | Mod: TC

## 2025-05-22 PROCEDURE — 99213 OFFICE O/P EST LOW 20 MIN: CPT | Mod: S$PBB,,, | Performed by: NEUROLOGICAL SURGERY

## 2025-05-22 PROCEDURE — 70546 MR ANGIOGRAPH HEAD W/O&W/DYE: CPT | Mod: 26,,, | Performed by: RADIOLOGY

## 2025-05-22 PROCEDURE — 72052 X-RAY EXAM NECK SPINE 6/>VWS: CPT | Mod: 26,,, | Performed by: RADIOLOGY

## 2025-05-22 PROCEDURE — 99999 PR PBB SHADOW E&M-EST. PATIENT-LVL V: CPT | Mod: PBBFAC,,, | Performed by: NEUROLOGICAL SURGERY

## 2025-05-22 PROCEDURE — 70546 MR ANGIOGRAPH HEAD W/O&W/DYE: CPT | Mod: TC

## 2025-05-22 RX ORDER — GADOBUTROL 604.72 MG/ML
9 INJECTION INTRAVENOUS
Status: COMPLETED | OUTPATIENT
Start: 2025-05-22 | End: 2025-05-22

## 2025-05-22 RX ORDER — MINOXIDIL 2.5 MG/1
2.5 TABLET ORAL EVERY MORNING
COMMUNITY
Start: 2025-03-09

## 2025-05-22 RX ORDER — FLUTICASONE PROPIONATE 50 MCG
SPRAY, SUSPENSION (ML) NASAL
COMMUNITY
Start: 2024-06-07

## 2025-05-22 RX ADMIN — GADOBUTROL 9 ML: 604.72 INJECTION INTRAVENOUS at 01:05

## 2025-05-22 NOTE — PROGRESS NOTES
Adriana Fonseca returned in neurosurgical follow-up to the office today.  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  She feels her vision is about the same with a fairly complete right homonymous hemianopsia but apparently on a more recent visit to her ophthalmologist it was felt there might be some change in her vision.  She has not been experiencing headaches.  She was apparently told she had a small knot on her occiput.    On brief examination today she is bright and alert.  I do not feel any abnormality over her skull, occipiut or cervical area.  Extraocular movements are good and pupils equal.  Again is noted what seems to be a fairly complete right homonymous hemianopsia.  This does not appear different from her previous examinations.  She is speaking clearly, provides a good history and seems otherwise neurologically intact.    MRA of the brain was done at [facility] today and compared to her earlier scans.  Again is seen the stent in the left internal carotid artery.  This creates some distortion of the image.  There is no evidence for recurrent aneurysm.  The middle and anterior cerebral arteries are. nicely filled.  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    I see no evidence for aneurysm recurrence and she is stable neurologically.  She will check with her ophthalmologist to clarify what was seen at her visit.  I believe we can wait 2 years before getting follow-up MRA to monitor the stent coiled aneurysm.